# Patient Record
Sex: MALE | Race: WHITE | NOT HISPANIC OR LATINO | Employment: FULL TIME | URBAN - METROPOLITAN AREA
[De-identification: names, ages, dates, MRNs, and addresses within clinical notes are randomized per-mention and may not be internally consistent; named-entity substitution may affect disease eponyms.]

---

## 2020-01-08 ENCOUNTER — OFFICE VISIT (OUTPATIENT)
Dept: FAMILY MEDICINE CLINIC | Facility: CLINIC | Age: 25
End: 2020-01-08

## 2020-01-08 VITALS
HEIGHT: 69 IN | DIASTOLIC BLOOD PRESSURE: 76 MMHG | HEART RATE: 94 BPM | RESPIRATION RATE: 16 BRPM | BODY MASS INDEX: 44.58 KG/M2 | WEIGHT: 301 LBS | OXYGEN SATURATION: 99 % | SYSTOLIC BLOOD PRESSURE: 124 MMHG

## 2020-01-08 DIAGNOSIS — F41.8 DEPRESSION WITH ANXIETY: ICD-10-CM

## 2020-01-08 DIAGNOSIS — Z79.899 HIGH RISK MEDICATION USE: ICD-10-CM

## 2020-01-08 DIAGNOSIS — B37.2 INTERTRIGINOUS CANDIDIASIS: ICD-10-CM

## 2020-01-08 DIAGNOSIS — Z23 NEED FOR IMMUNIZATION AGAINST INFLUENZA: ICD-10-CM

## 2020-01-08 DIAGNOSIS — IMO0001 HORMONAL IMBALANCE IN TRANSGENDER PATIENT: Primary | ICD-10-CM

## 2020-01-08 PROCEDURE — 90686 IIV4 VACC NO PRSV 0.5 ML IM: CPT | Performed by: FAMILY MEDICINE

## 2020-01-08 PROCEDURE — 99204 OFFICE O/P NEW MOD 45 MIN: CPT | Performed by: FAMILY MEDICINE

## 2020-01-08 PROCEDURE — 90471 IMMUNIZATION ADMIN: CPT | Performed by: FAMILY MEDICINE

## 2020-01-08 RX ORDER — ACETAZOLAMIDE 500 MG/1
500 CAPSULE, EXTENDED RELEASE ORAL DAILY
COMMUNITY
Start: 2019-11-19 | End: 2022-12-29 | Stop reason: SDUPTHER

## 2020-01-08 RX ORDER — IBUPROFEN 200 MG
200 TABLET ORAL
COMMUNITY
End: 2020-05-21

## 2020-01-08 RX ORDER — SYRINGE AND NEEDLE,INSULIN,1ML 25GX1"
SYRINGE, EMPTY DISPOSABLE MISCELLANEOUS
COMMUNITY
Start: 2019-12-11 | End: 2020-08-04

## 2020-01-08 RX ORDER — SPIRONOLACTONE 50 MG/1
TABLET, FILM COATED ORAL
COMMUNITY
Start: 2019-12-06 | End: 2020-05-21 | Stop reason: SDUPTHER

## 2020-01-08 RX ORDER — ESTRADIOL VALERATE 40 MG/ML
40 INJECTION INTRAMUSCULAR 2 TIMES WEEKLY
COMMUNITY
Start: 2019-10-21 | End: 2020-11-16 | Stop reason: SDUPTHER

## 2020-01-08 NOTE — PROGRESS NOTES
Amos Powell is a 24 y.o. male. Pt is a new pt for the clinic, and an actual Dr. Jason pt and is here for Hormone therapy replacement f/u, depression and rash.   Pt feels a little depress. Not using any medication currently, had last Psych apt about 4-5 months ago. Appointments are very pricey.   Pt also complains of a rash, inguinal area, bilateral. States sees like a discharge, white color. Says itched in the past days, not right now. Denies burning or skin cracking. It's first time ever happened to pt.    Pt will receive today Influenza immunization.     Chief Complaint   Patient presents with   • hormone therapy replacement   • Rash       GRISEL Coleman is here to reestablish care.  She was previously seen by myself at [facility].    Has been on gender affirming hormone therapy for some time, due for check of levels today and some refills.  Has been happy with her current regimen, is wondering about how much longer she might need to take spironolactone.  Happy with changes overall.    Depression has not been well controlled recently.  She was having difficulty seeing her psychiatrist due to finances.Does not does not feel that the bupropion was helping.  Seems to recall being on escitalopram in the past, though is not sure of other medications.  Does not believe that she is tried sertraline.    Noticed some rash in her inner thighs that started after an episode of long and vigorous walking.  Rash was wet weeping itchy, she thought due to yeast.  Does seem to be improving with topical terbinafine.    The following portions of the patient's history were reviewed and updated as appropriate: allergies, current medications, past family history, past medical history, past social history, past surgical history and problem list.    Review of Systems   Constitutional: Negative for chills, fatigue, fever and unexpected weight change.   HENT: Negative for sore throat.    Respiratory: Negative for cough, shortness of  "breath and wheezing.    Cardiovascular: Negative for chest pain, palpitations and leg swelling.   Gastrointestinal: Negative for abdominal distention, abdominal pain, constipation, diarrhea, nausea and vomiting.   Genitourinary: Negative for dysuria.   Musculoskeletal: Negative for arthralgias and myalgias.   Skin: Positive for color change and rash.   Neurological: Negative for dizziness.   Psychiatric/Behavioral: Positive for suicidal ideas. Negative for confusion. The patient is nervous/anxious.      I have reviewed and confirmed the ROS as documented by the MA. Nabil Jason MD    Objective  Vitals:    01/08/20 1414   BP: 124/76   Pulse: 94   Resp: 16   SpO2: 99%     Body mass index is 44.45 kg/m².    Physical Exam   Constitutional: He is oriented to person, place, and time. He appears well-developed and well-nourished. No distress.   Eyes: Pupils are equal, round, and reactive to light. EOM are normal.   Neck: Normal range of motion. Neck supple.   Cardiovascular: Normal rate, regular rhythm, normal heart sounds and intact distal pulses.   No murmur heard.  Pulmonary/Chest: Effort normal and breath sounds normal. No respiratory distress. He has no wheezes.   Abdominal: Soft. Bowel sounds are normal. There is no tenderness. There is no rebound and no guarding.   Musculoskeletal: Normal range of motion.   Neurological: He is alert and oriented to person, place, and time.   Skin: Skin is warm and dry.   Psychiatric: He has a normal mood and affect. His behavior is normal.   Nursing note and vitals reviewed.        Current Outpatient Medications:   •  acetaZOLAMIDE (DIAMOX) 500 MG capsule, TK ONE C PO  QD, Disp: , Rfl:   •  B-D INSULIN SYRINGE 1CC/25GX1\" 25G X 1\" 1 ML misc, USE TWICE A WEEK, Disp: , Rfl:   •  estradiol valerate (DELESTROGEN) 40 MG/ML injection, INJECT 0.15 ML IM Q 3.5 DAYS, Disp: , Rfl:   •  ibuprofen (ADVIL,MOTRIN) 200 MG tablet, Take 200 mg by mouth., Disp: , Rfl:   •  progesterone " (PROMETRIUM) 100 MG capsule, Take 100 mg by mouth Daily., Disp: , Rfl:   •  spironolactone (ALDACTONE) 50 MG tablet, , Disp: , Rfl:   •  sertraline (ZOLOFT) 50 MG tablet, Take 1 tablet by mouth Daily for 180 days., Disp: 90 tablet, Rfl: 1  Current outpatient and discharge medications have been reconciled for the patient.  Reviewed by: Nabil Jason MD      Procedures    Lab Results (most recent)     Lorri Trinidad was seen today for hormone therapy replacement and rash.    Diagnoses and all orders for this visit:    Hormonal imbalance in transgender patient  -     Estradiol  -     Estrone  -     Testosterone    High risk medication use  -     Estradiol  -     Estrone  -     Testosterone  -     Comprehensive Metabolic Panel  -     Lipid Panel With LDL / HDL Ratio    Need for immunization against influenza  -     Fluarix/Fluzone/Afluria/FluLaval (1019-2211)    Depression with anxiety  -     sertraline (ZOLOFT) 50 MG tablet; Take 1 tablet by mouth Daily for 180 days.    Intertriginous candidiasis    Orders as above.  I will contact her with results as soon as they are available.  Encouraged to sign up for and utilize [employer].    Decided we will try some sertraline for anxiety and depression.  Gave instructions for slow titration.  Discussed risks, benefits and expected effects.    Recommended some body glide for chafing to prevent any further intertrigo.  Can continue over-the-counter topical antifungal.    Any information loaded into the AVS was placed there by XIMENA Jason MD, and patient was counseled on the same.   No follow-ups on file.      XIMENA Jason MD

## 2020-01-09 LAB
ALBUMIN SERPL-MCNC: 4.2 G/DL (ref 3.5–5.5)
ALBUMIN/GLOB SERPL: 1.4 {RATIO} (ref 1.2–2.2)
ALP SERPL-CCNC: 55 IU/L (ref 39–117)
ALT SERPL-CCNC: 36 IU/L (ref 0–44)
AST SERPL-CCNC: 25 IU/L (ref 0–40)
BILIRUB SERPL-MCNC: 0.4 MG/DL (ref 0–1.2)
BUN SERPL-MCNC: 9 MG/DL (ref 6–20)
BUN/CREAT SERPL: 17 (ref 9–20)
CALCIUM SERPL-MCNC: 8.9 MG/DL (ref 8.7–10.2)
CHLORIDE SERPL-SCNC: 107 MMOL/L (ref 96–106)
CHOLEST SERPL-MCNC: 132 MG/DL (ref 100–199)
CO2 SERPL-SCNC: 18 MMOL/L (ref 20–29)
CREAT SERPL-MCNC: 0.52 MG/DL (ref 0.76–1.27)
ESTRADIOL SERPL-MCNC: 422.6 PG/ML (ref 7.6–42.6)
GLOBULIN SER CALC-MCNC: 2.9 G/DL (ref 1.5–4.5)
GLUCOSE SERPL-MCNC: 100 MG/DL (ref 65–99)
HDLC SERPL-MCNC: 40 MG/DL
LDLC SERPL CALC-MCNC: 66 MG/DL (ref 0–99)
LDLC/HDLC SERPL: 1.7 RATIO (ref 0–3.6)
POTASSIUM SERPL-SCNC: 4.1 MMOL/L (ref 3.5–5.2)
PROT SERPL-MCNC: 7.1 G/DL (ref 6–8.5)
SODIUM SERPL-SCNC: 142 MMOL/L (ref 134–144)
TESTOST SERPL-MCNC: 9 NG/DL (ref 264–916)
TRIGL SERPL-MCNC: 128 MG/DL (ref 0–149)
VLDLC SERPL CALC-MCNC: 26 MG/DL (ref 5–40)

## 2020-01-10 LAB — ESTRONE SERPL-MCNC: 223 PG/ML (ref 15–65)

## 2020-05-21 ENCOUNTER — OFFICE VISIT (OUTPATIENT)
Dept: FAMILY MEDICINE CLINIC | Facility: CLINIC | Age: 25
End: 2020-05-21

## 2020-05-21 VITALS
WEIGHT: 314 LBS | DIASTOLIC BLOOD PRESSURE: 90 MMHG | HEART RATE: 103 BPM | RESPIRATION RATE: 18 BRPM | HEIGHT: 69 IN | BODY MASS INDEX: 46.51 KG/M2 | OXYGEN SATURATION: 97 % | SYSTOLIC BLOOD PRESSURE: 134 MMHG

## 2020-05-21 DIAGNOSIS — IMO0001 HORMONAL IMBALANCE IN TRANSGENDER PATIENT: Primary | ICD-10-CM

## 2020-05-21 DIAGNOSIS — F41.8 DEPRESSION WITH ANXIETY: ICD-10-CM

## 2020-05-21 DIAGNOSIS — K21.9 GASTROESOPHAGEAL REFLUX DISEASE WITHOUT ESOPHAGITIS: ICD-10-CM

## 2020-05-21 DIAGNOSIS — R05.9 COUGH: ICD-10-CM

## 2020-05-21 PROCEDURE — 99214 OFFICE O/P EST MOD 30 MIN: CPT | Performed by: FAMILY MEDICINE

## 2020-05-21 RX ORDER — PANTOPRAZOLE SODIUM 20 MG/1
20 TABLET, DELAYED RELEASE ORAL DAILY
Qty: 90 TABLET | Refills: 1 | Status: SHIPPED | OUTPATIENT
Start: 2020-05-21 | End: 2021-02-23

## 2020-05-21 RX ORDER — SPIRONOLACTONE 50 MG/1
50 TABLET, FILM COATED ORAL DAILY
Qty: 90 TABLET | Refills: 1 | Status: SHIPPED | OUTPATIENT
Start: 2020-05-21 | End: 2020-11-17

## 2020-05-21 NOTE — PROGRESS NOTES
"    Amos Powell is a 25 y.o. male.     Chief Complaint   Patient presents with   • Heartburn       HPI     Here with concern for worsening GERD symptoms. Have been getting progressively worse over the past several months. BMs have been normal, typically 1-4 daily, never hard or requiring any straining. Has tried some OTC meds, TUMS help briefly, ranitidine \"doesn't do much\". Can't recall dose that was taken. Symptoms are tied to carbonated beverages, greasy foods, and acidic foods. Has been trying to cut back on the first two with some improvement.     Requesting refill of progesterone and spironolactone.  We discussed weaning down the spironolactone in the past.  She tried stopping it abruptly, noticed some increased hair growth which was rather bothersome.  Would be willing to try slowly tapering it down again.  Continues to take the progesterone with good results, tolerating it well.    Also requesting refill for sertraline.  Taking as prescribed and tolerating well with good symptom relief.    Lastly has had a cough off and on for several weeks.  Worried about her potential exposures to COVID, interested in testing today.    The following portions of the patient's history were reviewed and updated as appropriate: allergies, current medications, past family history, past medical history, past social history, past surgical history and problem list.    Review of Systems   Constitutional: Negative for chills, fatigue and fever.   Respiratory: Positive for cough.    Gastrointestinal: Positive for abdominal distention and nausea. Negative for constipation, diarrhea and vomiting.     I have reviewed and confirmed the ROS as documented by the MA. Nabil Jason MD    Objective  Vitals:    05/21/20 1550   BP: 134/90   Pulse: 103   Resp: 18   SpO2: 97%     Body mass index is 46.37 kg/m².    Physical Exam   Constitutional: He appears well-developed and well-nourished. No distress.   Cardiovascular: Normal rate, " "regular rhythm, normal heart sounds and intact distal pulses.   No murmur heard.  Pulmonary/Chest: Effort normal and breath sounds normal. No respiratory distress. He has no wheezes. He has no rales.   Abdominal: Soft. Bowel sounds are normal. He exhibits no distension. There is no tenderness. There is no rebound and no guarding.   Psychiatric: He has a normal mood and affect. His behavior is normal.   Nursing note and vitals reviewed.        Current Outpatient Medications:   •  acetaZOLAMIDE (DIAMOX) 500 MG capsule, TK ONE C PO  QD, Disp: , Rfl:   •  B-D INSULIN SYRINGE 1CC/25GX1\" 25G X 1\" 1 ML misc, USE TWICE A WEEK, Disp: , Rfl:   •  estradiol valerate (DELESTROGEN) 40 MG/ML injection, INJECT 0.15 ML IM Q 3.5 DAYS, Disp: , Rfl:   •  progesterone (PROMETRIUM) 100 MG capsule, Take 1 capsule by mouth Daily for 180 days., Disp: 90 capsule, Rfl: 1  •  sertraline (ZOLOFT) 50 MG tablet, Take 1 tablet by mouth Daily for 90 days., Disp: 30 tablet, Rfl: 2  •  spironolactone (ALDACTONE) 50 MG tablet, Take 1 tablet by mouth Daily for 180 days., Disp: 90 tablet, Rfl: 1  •  esomeprazole (nexIUM) 20 MG capsule, Take 1 capsule by mouth Every Morning Before Breakfast for 90 days., Disp: 90 capsule, Rfl: 0  Current outpatient and discharge medications have been reconciled for the patient.  Reviewed by: Nabil Jason MD      Procedures    Lab Results (most recent)     Lorri Trinidad was seen today for heartburn.    Diagnoses and all orders for this visit:    Hormonal imbalance in transgender patient  -     spironolactone (ALDACTONE) 50 MG tablet; Take 1 tablet by mouth Daily for 180 days.  -     progesterone (PROMETRIUM) 100 MG capsule; Take 1 capsule by mouth Daily for 180 days.    Depression with anxiety  -     sertraline (ZOLOFT) 50 MG tablet; Take 1 tablet by mouth Daily for 90 days.    Cough  -     Cancel: SARS-CoV-2, CAROL ANN ([facility]) - Swab, Nasopharynx  -     SARS-CoV-2, CAROL ANN ([facility]) - Swab, Nasopharynx; " Future    Gastroesophageal reflux disease without esophagitis  -     esomeprazole (nexIUM) 20 MG capsule; Take 1 capsule by mouth Every Morning Before Breakfast for 90 days.    Orders as above.  We discussed slowly titrating down the spironolactone.  Continue regimen otherwise.    We will try a short course of esomeprazole.  Encouraged her to take the lowest effective dose.    We will go to a [facility] urgent care facility for COVID testing as ordered above.    Any information loaded into the AVS was placed there by XIMENA Jason MD, and patient was counseled on the same.   Return in about 4 weeks (around 6/18/2020).      XIMENA Jason MD

## 2020-06-06 ENCOUNTER — HOSPITAL ENCOUNTER (EMERGENCY)
Facility: HOSPITAL | Age: 25
Discharge: HOME | End: 2020-06-07
Admitting: EMERGENCY MEDICINE

## 2020-06-06 ENCOUNTER — APPOINTMENT (OUTPATIENT)
Dept: CT IMAGING | Facility: HOSPITAL | Age: 25
End: 2020-06-06

## 2020-06-06 DIAGNOSIS — N20.1 LEFT URETERAL STONE: Primary | ICD-10-CM

## 2020-06-06 DIAGNOSIS — N13.30 HYDRONEPHROSIS OF LEFT KIDNEY: ICD-10-CM

## 2020-06-06 LAB
ALBUMIN SERPL-MCNC: 4.3 G/DL (ref 3.5–5.2)
ALBUMIN/GLOB SERPL: 1.2 G/DL
ALP SERPL-CCNC: 60 U/L (ref 39–117)
ALT SERPL W P-5'-P-CCNC: 43 U/L (ref 1–41)
ANION GAP SERPL CALCULATED.3IONS-SCNC: 12 MMOL/L (ref 5–15)
AST SERPL-CCNC: 30 U/L (ref 1–40)
BACTERIA UR QL AUTO: ABNORMAL /HPF
BASOPHILS # BLD AUTO: 0.1 10*3/MM3 (ref 0–0.2)
BASOPHILS NFR BLD AUTO: 0.5 % (ref 0–1.5)
BILIRUB SERPL-MCNC: 0.4 MG/DL (ref 0.2–1.2)
BILIRUB UR QL STRIP: NEGATIVE
BUN BLD-MCNC: 10 MG/DL (ref 6–20)
BUN BLD-MCNC: ABNORMAL MG/DL
BUN/CREAT SERPL: ABNORMAL
CALCIUM SPEC-SCNC: 9.5 MG/DL (ref 8.6–10.5)
CHLORIDE SERPL-SCNC: 107 MMOL/L (ref 98–107)
CLARITY UR: ABNORMAL
CO2 SERPL-SCNC: 20 MMOL/L (ref 22–29)
COLOR UR: YELLOW
CREAT BLD-MCNC: 0.6 MG/DL (ref 0.76–1.27)
DEPRECATED RDW RBC AUTO: 38.5 FL (ref 37–54)
EOSINOPHIL # BLD AUTO: 0.2 10*3/MM3 (ref 0–0.4)
EOSINOPHIL NFR BLD AUTO: 1.4 % (ref 0.3–6.2)
ERYTHROCYTE [DISTWIDTH] IN BLOOD BY AUTOMATED COUNT: 12.9 % (ref 12.3–15.4)
GFR SERPL CREATININE-BSD FRML MDRD: >150 ML/MIN/1.73
GLOBULIN UR ELPH-MCNC: 3.6 GM/DL
GLUCOSE BLD-MCNC: 97 MG/DL (ref 65–99)
GLUCOSE UR STRIP-MCNC: NEGATIVE MG/DL
HCT VFR BLD AUTO: 39.4 % (ref 37.5–51)
HGB BLD-MCNC: 13.5 G/DL (ref 13–17.7)
HGB UR QL STRIP.AUTO: ABNORMAL
HOLD SPECIMEN: NORMAL
HOLD SPECIMEN: NORMAL
HYALINE CASTS UR QL AUTO: ABNORMAL /LPF
KETONES UR QL STRIP: NEGATIVE
LEUKOCYTE ESTERASE UR QL STRIP.AUTO: NEGATIVE
LIPASE SERPL-CCNC: 19 U/L (ref 13–60)
LYMPHOCYTES # BLD AUTO: 2 10*3/MM3 (ref 0.7–3.1)
LYMPHOCYTES NFR BLD AUTO: 18 % (ref 19.6–45.3)
MCH RBC QN AUTO: 29.2 PG (ref 26.6–33)
MCHC RBC AUTO-ENTMCNC: 34.2 G/DL (ref 31.5–35.7)
MCV RBC AUTO: 85.4 FL (ref 79–97)
MONOCYTES # BLD AUTO: 0.9 10*3/MM3 (ref 0.1–0.9)
MONOCYTES NFR BLD AUTO: 7.6 % (ref 5–12)
NEUTROPHILS # BLD AUTO: 8.2 10*3/MM3 (ref 1.7–7)
NEUTROPHILS NFR BLD AUTO: 72.5 % (ref 42.7–76)
NITRITE UR QL STRIP: NEGATIVE
NRBC BLD AUTO-RTO: 0 /100 WBC (ref 0–0.2)
PH UR STRIP.AUTO: 7 [PH] (ref 5–8)
PLATELET # BLD AUTO: 475 10*3/MM3 (ref 140–450)
PMV BLD AUTO: 6.6 FL (ref 6–12)
POTASSIUM BLD-SCNC: 3.5 MMOL/L (ref 3.5–5.2)
PROT SERPL-MCNC: 7.9 G/DL (ref 6–8.5)
PROT UR QL STRIP: ABNORMAL
RBC # BLD AUTO: 4.62 10*6/MM3 (ref 4.14–5.8)
RBC # UR: ABNORMAL /HPF
REF LAB TEST METHOD: ABNORMAL
SODIUM BLD-SCNC: 139 MMOL/L (ref 136–145)
SP GR UR STRIP: 1.02 (ref 1–1.03)
SQUAMOUS #/AREA URNS HPF: ABNORMAL /HPF
UROBILINOGEN UR QL STRIP: ABNORMAL
WBC NRBC COR # BLD: 11.3 10*3/MM3 (ref 3.4–10.8)
WBC UR QL AUTO: ABNORMAL /HPF
WHOLE BLOOD HOLD SPECIMEN: NORMAL
WHOLE BLOOD HOLD SPECIMEN: NORMAL

## 2020-06-06 PROCEDURE — 83690 ASSAY OF LIPASE: CPT | Performed by: NURSE PRACTITIONER

## 2020-06-06 PROCEDURE — 25010000002 ONDANSETRON PER 1 MG: Performed by: NURSE PRACTITIONER

## 2020-06-06 PROCEDURE — 81001 URINALYSIS AUTO W/SCOPE: CPT | Performed by: NURSE PRACTITIONER

## 2020-06-06 PROCEDURE — 99284 EMERGENCY DEPT VISIT MOD MDM: CPT

## 2020-06-06 PROCEDURE — 96375 TX/PRO/DX INJ NEW DRUG ADDON: CPT

## 2020-06-06 PROCEDURE — 96374 THER/PROPH/DIAG INJ IV PUSH: CPT

## 2020-06-06 PROCEDURE — 25010000002 HYDROMORPHONE PER 4 MG: Performed by: NURSE PRACTITIONER

## 2020-06-06 PROCEDURE — 74176 CT ABD & PELVIS W/O CONTRAST: CPT

## 2020-06-06 PROCEDURE — 85025 COMPLETE CBC W/AUTO DIFF WBC: CPT | Performed by: NURSE PRACTITIONER

## 2020-06-06 PROCEDURE — 80053 COMPREHEN METABOLIC PANEL: CPT | Performed by: NURSE PRACTITIONER

## 2020-06-06 RX ORDER — HYDROMORPHONE HCL 110MG/55ML
1 PATIENT CONTROLLED ANALGESIA SYRINGE INTRAVENOUS ONCE
Status: COMPLETED | OUTPATIENT
Start: 2020-06-06 | End: 2020-06-06

## 2020-06-06 RX ORDER — SODIUM CHLORIDE 0.9 % (FLUSH) 0.9 %
10 SYRINGE (ML) INJECTION AS NEEDED
Status: DISCONTINUED | OUTPATIENT
Start: 2020-06-06 | End: 2020-06-07 | Stop reason: HOSPADM

## 2020-06-06 RX ORDER — ONDANSETRON 2 MG/ML
4 INJECTION INTRAMUSCULAR; INTRAVENOUS ONCE
Status: COMPLETED | OUTPATIENT
Start: 2020-06-06 | End: 2020-06-06

## 2020-06-06 RX ADMIN — ONDANSETRON 4 MG: 2 INJECTION INTRAMUSCULAR; INTRAVENOUS at 22:31

## 2020-06-06 RX ADMIN — SODIUM CHLORIDE, SODIUM LACTATE, POTASSIUM CHLORIDE, AND CALCIUM CHLORIDE 1000 ML: 600; 310; 30; 20 INJECTION, SOLUTION INTRAVENOUS at 22:31

## 2020-06-06 RX ADMIN — HYDROMORPHONE HYDROCHLORIDE 1 MG: 2 INJECTION, SOLUTION INTRAMUSCULAR; INTRAVENOUS; SUBCUTANEOUS at 22:31

## 2020-06-07 VITALS
RESPIRATION RATE: 16 BRPM | BODY MASS INDEX: 45.65 KG/M2 | WEIGHT: 308.2 LBS | HEIGHT: 69 IN | HEART RATE: 103 BPM | DIASTOLIC BLOOD PRESSURE: 77 MMHG | SYSTOLIC BLOOD PRESSURE: 108 MMHG | OXYGEN SATURATION: 98 % | TEMPERATURE: 97.1 F

## 2020-06-07 RX ORDER — ONDANSETRON 4 MG/1
4 TABLET, ORALLY DISINTEGRATING ORAL EVERY 8 HOURS PRN
Qty: 20 TABLET | Refills: 0 | Status: SHIPPED | OUTPATIENT
Start: 2020-06-07 | End: 2020-08-04

## 2020-06-07 RX ORDER — TAMSULOSIN HYDROCHLORIDE 0.4 MG/1
1 CAPSULE ORAL NIGHTLY
Qty: 30 CAPSULE | Refills: 0 | Status: SHIPPED | OUTPATIENT
Start: 2020-06-07 | End: 2020-08-04

## 2020-06-07 RX ORDER — HYDROCODONE BITARTRATE AND ACETAMINOPHEN 7.5; 325 MG/1; MG/1
1 TABLET ORAL EVERY 6 HOURS PRN
Qty: 12 TABLET | Refills: 0 | Status: SHIPPED | OUTPATIENT
Start: 2020-06-07 | End: 2020-08-04

## 2020-06-07 NOTE — ED PROVIDER NOTES
"Subjective   25-year-old male that identifies as a female presents with complaint of left flank pain that radiates to the testicle with nausea and vomiting 2 episodes since 11 AM.  Reports a history of kidney stones and this \"feels the same\" denies fever sweats or chills.  Denies hematuria does report some cloudy urine.  Denies melena hematochezia.  Reports normal bowel movements.  Patient is on hormone therapy for transition.  Denies having a urologist.    1. Location: Left flank  2. Quality: Sharp shooting  3. Severity: Moderate  4. Worsening factors: Vomiting  5. Alleviating factors: Denies  6. Onset: 11 AM  7. Radiation: left testicle  8. Frequency: constant with periods of intensity  9. Co-morbidities: Past Medical History:  No date: Depression  No date: Gender dysphoria  No date: Kidney calculi  10. Source: patient            Review of Systems   Constitutional: Negative for appetite change, chills, diaphoresis and fever.   Gastrointestinal: Positive for abdominal pain, nausea and vomiting. Negative for blood in stool, constipation and diarrhea.   Genitourinary: Positive for flank pain and testicular pain. Negative for decreased urine volume, difficulty urinating and hematuria.   Skin: Negative for color change, pallor and rash.   Hematological: Negative for adenopathy.   All other systems reviewed and are negative.      Past Medical History:   Diagnosis Date   • Depression    • Gender dysphoria    • Kidney calculi        No Known Allergies    Past Surgical History:   Procedure Laterality Date   • WISDOM TOOTH EXTRACTION Bilateral     top and bottom       Family History   Problem Relation Age of Onset   • Hypertension Maternal Grandmother    • Diabetes type II Paternal Grandfather        Social History     Socioeconomic History   • Marital status: Single     Spouse name: Not on file   • Number of children: Not on file   • Years of education: Not on file   • Highest education level: Not on file   Tobacco Use   • " Smoking status: Never Smoker   Substance and Sexual Activity   • Alcohol use: Yes     Alcohol/week: 2.0 standard drinks     Types: 2 Cans of beer per week   • Drug use: Never   • Sexual activity: Yes     Partners: Female     Birth control/protection: Condom           Objective   Physical Exam   Constitutional: He is oriented to person, place, and time. He appears well-developed and well-nourished. He is active and cooperative.  Non-toxic appearance. He appears distressed (pain).   Cardiovascular: Normal rate, regular rhythm, normal heart sounds and intact distal pulses. Exam reveals no gallop and no friction rub.   No murmur heard.  Pulmonary/Chest: Effort normal and breath sounds normal. No respiratory distress. He has no wheezes. He has no rales.   Abdominal: Soft. Normal appearance and bowel sounds are normal. He exhibits no distension and no mass. There is no hepatosplenomegaly. There is tenderness. There is no rigidity, no rebound, no guarding, no CVA tenderness, no tenderness at McBurney's point and negative Lomas's sign. No hernia. Hernia confirmed negative in the right inguinal area and confirmed negative in the left inguinal area.   Genitourinary: Penis normal. Cremasteric reflex is present. Right testis shows no mass, no swelling and no tenderness. Right testis is descended. Cremasteric reflex is not absent on the right side. Left testis shows tenderness. Left testis shows no mass and no swelling. Left testis is descended. Cremasteric reflex is not absent on the left side. Circumcised.   Lymphadenopathy: No inguinal adenopathy noted on the right or left side.   Neurological: He is alert and oriented to person, place, and time.   Skin: Skin is warm and dry. Capillary refill takes less than 2 seconds. No rash noted. No erythema. No pallor.   Psychiatric: He has a normal mood and affect. His behavior is normal. Judgment and thought content normal.   Nursing note and vitals reviewed.      Procedures            ED Course    Ct Abdomen Pelvis Without Contrast    Result Date: 6/6/2020  1.  Mild to moderate left-sided hydronephrosis due to a 2.5 mm proximal ureteral stone. 2.  6 mm right renal stone. 3.  Fatty replacement of liver. Electronically signed by:  Jaxon Gordon M.D.  6/6/2020 9:35 PM    Medications   sodium chloride 0.9 % flush 10 mL (has no administration in time range)   lactated ringers bolus 1,000 mL (1,000 mL Intravenous New Bag 6/6/20 2231)   HYDROmorphone (DILAUDID) injection 1 mg (1 mg Intravenous Given 6/6/20 2231)   ondansetron (ZOFRAN) injection 4 mg (4 mg Intravenous Given 6/6/20 2231)     Labs Reviewed   COMPREHENSIVE METABOLIC PANEL - Abnormal; Notable for the following components:       Result Value    Creatinine 0.60 (*)     CO2 20.0 (*)     ALT (SGPT) 43 (*)     All other components within normal limits    Narrative:     GFR Normal >60  Chronic Kidney Disease <60  Kidney Failure <15     URINALYSIS W/ MICROSCOPIC IF INDICATED (NO CULTURE) - Abnormal; Notable for the following components:    Appearance, UA Hazy (*)     Blood, UA Large (3+) (*)     Protein, UA 30 mg/dL (1+) (*)     All other components within normal limits   CBC WITH AUTO DIFFERENTIAL - Abnormal; Notable for the following components:    WBC 11.30 (*)     Platelets 475 (*)     Lymphocyte % 18.0 (*)     Neutrophils, Absolute 8.20 (*)     All other components within normal limits   URINALYSIS, MICROSCOPIC ONLY - Abnormal; Notable for the following components:    RBC, UA 31-50 (*)     WBC, UA 3-5 (*)     Squamous Epithelial Cells, UA 3-6 (*)     All other components within normal limits   LIPASE - Normal   BUN - Normal   RAINBOW DRAW    Narrative:     The following orders were created for panel order [place] Draw.  Procedure                               Abnormality         Status                     ---------                               -----------         ------                     Light Blue Top[[id number]]                       "             Final result               Green Top (Gel)[[id number]]                                  Final result               Lavender Top[[id number]]                                     Final result               Gold Top - SST[[id number]]                                   Final result                 Please view results for these tests on the individual orders.   CBC AND DIFFERENTIAL    Narrative:     The following orders were created for panel order CBC & Differential.  Procedure                               Abnormality         Status                     ---------                               -----------         ------                     CBC Auto Differential[[id number]]        Abnormal            Final result                 Please view results for these tests on the individual orders.   LIGHT BLUE TOP   GREEN TOP   LAVENDER TOP   GOLD TOP - SST                                            MDM  Number of Diagnoses or Management Options  Hydronephrosis of left kidney:   Left ureteral stone:   Diagnosis management comments: Chart Review: 5/25/2020 patient was seen by PCP for follow-up on dyspepsia.  Comorbidity: Past Medical History:  No date: Depression  No date: Gender dysphoria  No date: Kidney calculi  Imaging: Was interpreted by physician and reviewed by myself: Ct Abdomen Pelvis Without Contrast    Result Date: 6/6/2020  1.  Mild to moderate left-sided hydronephrosis due to a 2.5 mm proximal ureteral stone. 2.  6 mm right renal stone. 3.  Fatty replacement of liver. Electronically signed by:  Jaxon Gordon M.D.  6/6/2020 9:35 PM      Patient undressed and placed in gown for exam.  Appropriate PPE worn during patient exam. 25-year-old male that identifies as a female presents with complaint of left flank pain that radiates to the testicle with nausea and vomiting 2 episodes since 11 AM.  Reports a history of kidney stones and this \"feels the same\" denies fever sweats or chills.  Denies hematuria does report " some cloudy urine.  Denies melena hematochezia.  Reports normal bowel movements.  Patient is on hormone therapy for transition.  Denies having a urologist.  IV established and labs obtained.  Abdominal protocol initiated.  CT abdomen pelvis without contrast obtained, which was significant for a left proximal ureter stone with hydronephrosis.  Upon reassessment, patient reports relief with medications given.  He was discharged home with Flomax and Zofran.  Inspect performed yielding no results.  Patient was given Norco 7.5/325 p.o. quantity of 12.  He is given follow-up with urology.    Disposition/Treatment: Discussed results with patient, verbalized understanding.  Discussed reasons to return to the ER, patient verbalized understanding.  Agreeable with plan of care.  Patient was stable upon discharge.               Amount and/or Complexity of Data Reviewed  Clinical lab tests: reviewed  Tests in the radiology section of CPT®: reviewed    Patient Progress  Patient progress: stable      Final diagnoses:   Left ureteral stone   Hydronephrosis of left kidney            Sri Kessler NP  06/07/20 0059

## 2020-06-07 NOTE — DISCHARGE INSTRUCTIONS
Take medications as prescribed.  Drink lots of water.  Follow renal diet.  Strain all urine and collect stone in specimen cup.  Call urology for follow-up appointment.  Return to the ER for new or worsening symptoms.

## 2020-08-04 ENCOUNTER — ANESTHESIA (OUTPATIENT)
Dept: PERIOP | Facility: HOSPITAL | Age: 25
End: 2020-08-04

## 2020-08-04 ENCOUNTER — READMISSION MANAGEMENT (OUTPATIENT)
Dept: CALL CENTER | Facility: HOSPITAL | Age: 25
End: 2020-08-04

## 2020-08-04 ENCOUNTER — ANESTHESIA EVENT (OUTPATIENT)
Dept: PERIOP | Facility: HOSPITAL | Age: 25
End: 2020-08-04

## 2020-08-04 ENCOUNTER — HOSPITAL ENCOUNTER (OUTPATIENT)
Facility: HOSPITAL | Age: 25
Setting detail: OBSERVATION
Discharge: HOME | End: 2020-08-05
Attending: EMERGENCY MEDICINE | Admitting: HOSPITALIST

## 2020-08-04 ENCOUNTER — HOSPITAL ENCOUNTER (OUTPATIENT)
Facility: HOSPITAL | Age: 25
Discharge: HOME | End: 2020-08-04
Attending: HOSPITALIST | Admitting: NURSE PRACTITIONER

## 2020-08-04 ENCOUNTER — APPOINTMENT (OUTPATIENT)
Dept: CT IMAGING | Facility: HOSPITAL | Age: 25
End: 2020-08-04

## 2020-08-04 VITALS
BODY MASS INDEX: 46.65 KG/M2 | RESPIRATION RATE: 18 BRPM | HEIGHT: 69 IN | WEIGHT: 315 LBS | SYSTOLIC BLOOD PRESSURE: 118 MMHG | OXYGEN SATURATION: 96 % | TEMPERATURE: 97.9 F | DIASTOLIC BLOOD PRESSURE: 77 MMHG | HEART RATE: 99 BPM

## 2020-08-04 DIAGNOSIS — D72.829 LEUKOCYTOSIS, UNSPECIFIED TYPE: ICD-10-CM

## 2020-08-04 DIAGNOSIS — R10.9 RIGHT FLANK PAIN: ICD-10-CM

## 2020-08-04 DIAGNOSIS — N20.1 RIGHT URETERAL STONE: ICD-10-CM

## 2020-08-04 DIAGNOSIS — Q62.11 HYDRONEPHROSIS WITH URETEROPELVIC JUNCTION (UPJ) OBSTRUCTION: Primary | ICD-10-CM

## 2020-08-04 DIAGNOSIS — R10.9 FLANK PAIN: ICD-10-CM

## 2020-08-04 DIAGNOSIS — R11.2 NAUSEA AND VOMITING, INTRACTABILITY OF VOMITING NOT SPECIFIED, UNSPECIFIED VOMITING TYPE: ICD-10-CM

## 2020-08-04 DIAGNOSIS — R10.9 RIGHT FLANK PAIN: Primary | ICD-10-CM

## 2020-08-04 PROBLEM — F41.8 DEPRESSION WITH ANXIETY: Chronic | Status: ACTIVE | Noted: 2020-01-08

## 2020-08-04 PROBLEM — K21.9 GASTROESOPHAGEAL REFLUX DISEASE WITHOUT ESOPHAGITIS: Chronic | Status: ACTIVE | Noted: 2020-05-21

## 2020-08-04 PROBLEM — E66.01 OBESITY, CLASS III, BMI 40-49.9 (MORBID OBESITY): Chronic | Status: ACTIVE | Noted: 2020-08-04

## 2020-08-04 PROBLEM — IMO0001 HORMONAL IMBALANCE IN TRANSGENDER PATIENT: Chronic | Status: ACTIVE | Noted: 2020-01-08

## 2020-08-04 PROBLEM — E66.813 OBESITY, CLASS III, BMI 40-49.9 (MORBID OBESITY): Chronic | Status: ACTIVE | Noted: 2020-08-04

## 2020-08-04 LAB
ALBUMIN SERPL-MCNC: 4.2 G/DL (ref 3.5–5.2)
ALBUMIN/GLOB SERPL: 1.4 G/DL
ALP SERPL-CCNC: 62 U/L (ref 39–117)
ALT SERPL W P-5'-P-CCNC: 46 U/L (ref 1–41)
ANION GAP SERPL CALCULATED.3IONS-SCNC: 15 MMOL/L (ref 5–15)
AST SERPL-CCNC: 34 U/L (ref 1–40)
BACTERIA UR QL AUTO: ABNORMAL /HPF
BASOPHILS # BLD AUTO: 0.1 10*3/MM3 (ref 0–0.2)
BASOPHILS # BLD AUTO: 0.1 10*3/MM3 (ref 0–0.2)
BASOPHILS NFR BLD AUTO: 0.3 % (ref 0–1.5)
BASOPHILS NFR BLD AUTO: 0.7 % (ref 0–1.5)
BILIRUB SERPL-MCNC: 0.3 MG/DL (ref 0–1.2)
BILIRUB UR QL STRIP: NEGATIVE
BUN SERPL-MCNC: 10 MG/DL (ref 6–20)
BUN SERPL-MCNC: ABNORMAL MG/DL
BUN/CREAT SERPL: ABNORMAL
CALCIUM SPEC-SCNC: 9.2 MG/DL (ref 8.6–10.5)
CHLORIDE SERPL-SCNC: 105 MMOL/L (ref 98–107)
CLARITY UR: CLEAR
CO2 SERPL-SCNC: 20 MMOL/L (ref 22–29)
COLOR UR: YELLOW
CREAT SERPL-MCNC: 0.7 MG/DL (ref 0.76–1.27)
DEPRECATED RDW RBC AUTO: 40.3 FL (ref 37–54)
DEPRECATED RDW RBC AUTO: 41.1 FL (ref 37–54)
EOSINOPHIL # BLD AUTO: 0 10*3/MM3 (ref 0–0.4)
EOSINOPHIL # BLD AUTO: 0.2 10*3/MM3 (ref 0–0.4)
EOSINOPHIL NFR BLD AUTO: 0 % (ref 0.3–6.2)
EOSINOPHIL NFR BLD AUTO: 1.6 % (ref 0.3–6.2)
ERYTHROCYTE [DISTWIDTH] IN BLOOD BY AUTOMATED COUNT: 13.3 % (ref 12.3–15.4)
ERYTHROCYTE [DISTWIDTH] IN BLOOD BY AUTOMATED COUNT: 13.7 % (ref 12.3–15.4)
GFR SERPL CREATININE-BSD FRML MDRD: 137 ML/MIN/1.73
GLOBULIN UR ELPH-MCNC: 3 GM/DL
GLUCOSE SERPL-MCNC: 115 MG/DL (ref 65–99)
GLUCOSE UR STRIP-MCNC: NEGATIVE MG/DL
HCT VFR BLD AUTO: 39.5 % (ref 37.5–51)
HCT VFR BLD AUTO: 39.8 % (ref 37.5–51)
HGB BLD-MCNC: 13.1 G/DL (ref 13–17.7)
HGB BLD-MCNC: 13.2 G/DL (ref 13–17.7)
HGB UR QL STRIP.AUTO: ABNORMAL
HOLD SPECIMEN: NORMAL
HYALINE CASTS UR QL AUTO: ABNORMAL /LPF
KETONES UR QL STRIP: NEGATIVE
LEUKOCYTE ESTERASE UR QL STRIP.AUTO: NEGATIVE
LIPASE SERPL-CCNC: 23 U/L (ref 13–60)
LYMPHOCYTES # BLD AUTO: 1.4 10*3/MM3 (ref 0.7–3.1)
LYMPHOCYTES # BLD AUTO: 2.1 10*3/MM3 (ref 0.7–3.1)
LYMPHOCYTES NFR BLD AUTO: 16.1 % (ref 19.6–45.3)
LYMPHOCYTES NFR BLD AUTO: 7 % (ref 19.6–45.3)
MCH RBC QN AUTO: 28.4 PG (ref 26.6–33)
MCH RBC QN AUTO: 28.7 PG (ref 26.6–33)
MCHC RBC AUTO-ENTMCNC: 33.1 G/DL (ref 31.5–35.7)
MCHC RBC AUTO-ENTMCNC: 33.2 G/DL (ref 31.5–35.7)
MCV RBC AUTO: 85.7 FL (ref 79–97)
MCV RBC AUTO: 86.7 FL (ref 79–97)
MONOCYTES # BLD AUTO: 0.5 10*3/MM3 (ref 0.1–0.9)
MONOCYTES # BLD AUTO: 0.9 10*3/MM3 (ref 0.1–0.9)
MONOCYTES NFR BLD AUTO: 2.7 % (ref 5–12)
MONOCYTES NFR BLD AUTO: 7.1 % (ref 5–12)
NEUTROPHILS NFR BLD AUTO: 17.5 10*3/MM3 (ref 1.7–7)
NEUTROPHILS NFR BLD AUTO: 74.5 % (ref 42.7–76)
NEUTROPHILS NFR BLD AUTO: 9.6 10*3/MM3 (ref 1.7–7)
NEUTROPHILS NFR BLD AUTO: 90 % (ref 42.7–76)
NITRITE UR QL STRIP: NEGATIVE
NRBC BLD AUTO-RTO: 0 /100 WBC (ref 0–0.2)
NRBC BLD AUTO-RTO: 0 /100 WBC (ref 0–0.2)
PH UR STRIP.AUTO: 5.5 [PH] (ref 5–8)
PLATELET # BLD AUTO: 482 10*3/MM3 (ref 140–450)
PLATELET # BLD AUTO: 493 10*3/MM3 (ref 140–450)
PMV BLD AUTO: 6.6 FL (ref 6–12)
PMV BLD AUTO: 6.8 FL (ref 6–12)
POTASSIUM SERPL-SCNC: 3.5 MMOL/L (ref 3.5–5.2)
PROT SERPL-MCNC: 7.2 G/DL (ref 6–8.5)
PROT UR QL STRIP: NEGATIVE
RBC # BLD AUTO: 4.56 10*6/MM3 (ref 4.14–5.8)
RBC # BLD AUTO: 4.64 10*6/MM3 (ref 4.14–5.8)
RBC # UR: ABNORMAL /HPF
REF LAB TEST METHOD: ABNORMAL
SODIUM SERPL-SCNC: 140 MMOL/L (ref 136–145)
SP GR UR STRIP: 1.02 (ref 1–1.03)
SQUAMOUS #/AREA URNS HPF: ABNORMAL /HPF
UROBILINOGEN UR QL STRIP: ABNORMAL
WBC # BLD AUTO: 12.9 10*3/MM3 (ref 3.4–10.8)
WBC # BLD AUTO: 19.4 10*3/MM3 (ref 3.4–10.8)
WBC UR QL AUTO: ABNORMAL /HPF
WHOLE BLOOD HOLD SPECIMEN: NORMAL

## 2020-08-04 PROCEDURE — 96374 THER/PROPH/DIAG INJ IV PUSH: CPT

## 2020-08-04 PROCEDURE — 80053 COMPREHEN METABOLIC PANEL: CPT | Performed by: PHYSICIAN ASSISTANT

## 2020-08-04 PROCEDURE — G0378 HOSPITAL OBSERVATION PER HR: HCPCS

## 2020-08-04 PROCEDURE — 25010000002 MIDAZOLAM PER 1 MG: Performed by: ANESTHESIOLOGY

## 2020-08-04 PROCEDURE — 96361 HYDRATE IV INFUSION ADD-ON: CPT

## 2020-08-04 PROCEDURE — 25010000002 KETOROLAC TROMETHAMINE PER 15 MG: Performed by: ANESTHESIOLOGY

## 2020-08-04 PROCEDURE — 85025 COMPLETE CBC W/AUTO DIFF WBC: CPT | Performed by: PHYSICIAN ASSISTANT

## 2020-08-04 PROCEDURE — 25010000002 ONDANSETRON PER 1 MG: Performed by: ANESTHESIOLOGY

## 2020-08-04 PROCEDURE — 25010000002 MORPHINE PER 10 MG: Performed by: EMERGENCY MEDICINE

## 2020-08-04 PROCEDURE — 74176 CT ABD & PELVIS W/O CONTRAST: CPT

## 2020-08-04 PROCEDURE — C2617 STENT, NON-COR, TEM W/O DEL: HCPCS | Performed by: UROLOGY

## 2020-08-04 PROCEDURE — 25010000002 PROPOFOL 10 MG/ML EMULSION: Performed by: ANESTHESIOLOGY

## 2020-08-04 PROCEDURE — 25010000002 ONDANSETRON PER 1 MG: Performed by: EMERGENCY MEDICINE

## 2020-08-04 PROCEDURE — 96376 TX/PRO/DX INJ SAME DRUG ADON: CPT

## 2020-08-04 PROCEDURE — 81001 URINALYSIS AUTO W/SCOPE: CPT | Performed by: PHYSICIAN ASSISTANT

## 2020-08-04 PROCEDURE — C1758 CATHETER, URETERAL: HCPCS | Performed by: UROLOGY

## 2020-08-04 PROCEDURE — 96375 TX/PRO/DX INJ NEW DRUG ADDON: CPT

## 2020-08-04 PROCEDURE — 25010000002 FENTANYL CITRATE (PF) 100 MCG/2ML SOLUTION: Performed by: ANESTHESIOLOGY

## 2020-08-04 PROCEDURE — 25010000002 ONDANSETRON PER 1 MG: Performed by: PHYSICIAN ASSISTANT

## 2020-08-04 PROCEDURE — 84145 PROCALCITONIN (PCT): CPT | Performed by: PHYSICIAN ASSISTANT

## 2020-08-04 PROCEDURE — 99284 EMERGENCY DEPT VISIT MOD MDM: CPT

## 2020-08-04 PROCEDURE — 25010000002 KETOROLAC TROMETHAMINE PER 15 MG: Performed by: PHYSICIAN ASSISTANT

## 2020-08-04 PROCEDURE — C1769 GUIDE WIRE: HCPCS | Performed by: UROLOGY

## 2020-08-04 PROCEDURE — 83690 ASSAY OF LIPASE: CPT | Performed by: PHYSICIAN ASSISTANT

## 2020-08-04 PROCEDURE — 99217 PR OBSERVATION CARE DISCHARGE MANAGEMENT: CPT | Performed by: HOSPITALIST

## 2020-08-04 PROCEDURE — 25010000002 DEXAMETHASONE PER 1 MG: Performed by: ANESTHESIOLOGY

## 2020-08-04 DEVICE — URETERAL STENT
Type: IMPLANTABLE DEVICE | Site: URETER | Status: FUNCTIONAL
Brand: PERCUFLEX™ PLUS

## 2020-08-04 RX ORDER — DEXAMETHASONE SODIUM PHOSPHATE 4 MG/ML
INJECTION, SOLUTION INTRA-ARTICULAR; INTRALESIONAL; INTRAMUSCULAR; INTRAVENOUS; SOFT TISSUE AS NEEDED
Status: DISCONTINUED | OUTPATIENT
Start: 2020-08-04 | End: 2020-08-04 | Stop reason: SURG

## 2020-08-04 RX ORDER — ONDANSETRON 2 MG/ML
4 INJECTION INTRAMUSCULAR; INTRAVENOUS EVERY 6 HOURS PRN
Status: DISCONTINUED | OUTPATIENT
Start: 2020-08-04 | End: 2020-08-04 | Stop reason: HOSPADM

## 2020-08-04 RX ORDER — PROPOFOL 10 MG/ML
VIAL (ML) INTRAVENOUS AS NEEDED
Status: DISCONTINUED | OUTPATIENT
Start: 2020-08-04 | End: 2020-08-04 | Stop reason: SURG

## 2020-08-04 RX ORDER — ACETAMINOPHEN 650 MG/1
650 SUPPOSITORY RECTAL EVERY 4 HOURS PRN
Status: DISCONTINUED | OUTPATIENT
Start: 2020-08-04 | End: 2020-08-04

## 2020-08-04 RX ORDER — DOCUSATE SODIUM 100 MG/1
200 CAPSULE, LIQUID FILLED ORAL 2 TIMES DAILY
Status: DISCONTINUED | OUTPATIENT
Start: 2020-08-04 | End: 2020-08-04 | Stop reason: HOSPADM

## 2020-08-04 RX ORDER — ATROPA BELLADONNA AND OPIUM 16.2; 3 MG/1; MG/1
30 SUPPOSITORY RECTAL DAILY PRN
Status: DISCONTINUED | OUTPATIENT
Start: 2020-08-04 | End: 2020-08-04 | Stop reason: HOSPADM

## 2020-08-04 RX ORDER — FENTANYL CITRATE 50 UG/ML
50 INJECTION, SOLUTION INTRAMUSCULAR; INTRAVENOUS
Status: DISCONTINUED | OUTPATIENT
Start: 2020-08-04 | End: 2020-08-04 | Stop reason: HOSPADM

## 2020-08-04 RX ORDER — ONDANSETRON 2 MG/ML
4 INJECTION INTRAMUSCULAR; INTRAVENOUS ONCE
Status: COMPLETED | OUTPATIENT
Start: 2020-08-04 | End: 2020-08-04

## 2020-08-04 RX ORDER — NALOXONE HCL 0.4 MG/ML
0.4 VIAL (ML) INJECTION
Status: DISCONTINUED | OUTPATIENT
Start: 2020-08-04 | End: 2020-08-04 | Stop reason: HOSPADM

## 2020-08-04 RX ORDER — MORPHINE SULFATE 4 MG/ML
4 INJECTION, SOLUTION INTRAMUSCULAR; INTRAVENOUS ONCE
Status: COMPLETED | OUTPATIENT
Start: 2020-08-04 | End: 2020-08-04

## 2020-08-04 RX ORDER — SODIUM CHLORIDE 0.9 % (FLUSH) 0.9 %
10 SYRINGE (ML) INJECTION EVERY 12 HOURS SCHEDULED
Status: DISCONTINUED | OUTPATIENT
Start: 2020-08-04 | End: 2020-08-04 | Stop reason: HOSPADM

## 2020-08-04 RX ORDER — NALOXONE HCL 0.4 MG/ML
0.1 VIAL (ML) INJECTION
Status: DISCONTINUED | OUTPATIENT
Start: 2020-08-04 | End: 2020-08-04 | Stop reason: HOSPADM

## 2020-08-04 RX ORDER — CHOLECALCIFEROL (VITAMIN D3) 125 MCG
5 CAPSULE ORAL NIGHTLY PRN
Status: DISCONTINUED | OUTPATIENT
Start: 2020-08-04 | End: 2020-08-04 | Stop reason: HOSPADM

## 2020-08-04 RX ORDER — ONDANSETRON 2 MG/ML
4 INJECTION INTRAMUSCULAR; INTRAVENOUS ONCE AS NEEDED
Status: DISCONTINUED | OUTPATIENT
Start: 2020-08-04 | End: 2020-08-04 | Stop reason: HOSPADM

## 2020-08-04 RX ORDER — ONDANSETRON 4 MG/1
4 TABLET, FILM COATED ORAL EVERY 6 HOURS PRN
Qty: 12 TABLET | Refills: 0 | Status: SHIPPED | OUTPATIENT
Start: 2020-08-04 | End: 2021-02-23

## 2020-08-04 RX ORDER — CEFAZOLIN SODIUM IN 0.9 % NACL 3 G/100 ML
3 INTRAVENOUS SOLUTION, PIGGYBACK (ML) INTRAVENOUS ONCE
Status: DISCONTINUED | OUTPATIENT
Start: 2020-08-04 | End: 2020-08-04 | Stop reason: HOSPADM

## 2020-08-04 RX ORDER — MORPHINE SULFATE 4 MG/ML
2 INJECTION, SOLUTION INTRAMUSCULAR; INTRAVENOUS ONCE
Status: COMPLETED | OUTPATIENT
Start: 2020-08-04 | End: 2020-08-04

## 2020-08-04 RX ORDER — CEPHALEXIN 500 MG/1
500 CAPSULE ORAL 2 TIMES DAILY
Qty: 10 CAPSULE | Refills: 0 | Status: SHIPPED | OUTPATIENT
Start: 2020-08-04 | End: 2020-08-04 | Stop reason: HOSPADM

## 2020-08-04 RX ORDER — SODIUM CHLORIDE 9 MG/ML
INJECTION, SOLUTION INTRAVENOUS CONTINUOUS PRN
Status: DISCONTINUED | OUTPATIENT
Start: 2020-08-04 | End: 2020-08-04 | Stop reason: SURG

## 2020-08-04 RX ORDER — BISACODYL 10 MG
10 SUPPOSITORY, RECTAL RECTAL DAILY PRN
Status: DISCONTINUED | OUTPATIENT
Start: 2020-08-04 | End: 2020-08-04 | Stop reason: HOSPADM

## 2020-08-04 RX ORDER — HYDROCODONE BITARTRATE AND ACETAMINOPHEN 5; 325 MG/1; MG/1
1 TABLET ORAL EVERY 6 HOURS PRN
Qty: 12 TABLET | Refills: 0 | Status: SHIPPED | OUTPATIENT
Start: 2020-08-04 | End: 2020-08-04 | Stop reason: HOSPADM

## 2020-08-04 RX ORDER — ALUMINA, MAGNESIA, AND SIMETHICONE 2400; 2400; 240 MG/30ML; MG/30ML; MG/30ML
15 SUSPENSION ORAL EVERY 6 HOURS PRN
Status: DISCONTINUED | OUTPATIENT
Start: 2020-08-04 | End: 2020-08-04 | Stop reason: HOSPADM

## 2020-08-04 RX ORDER — ACETAMINOPHEN 325 MG/1
650 TABLET ORAL EVERY 4 HOURS PRN
Status: DISCONTINUED | OUTPATIENT
Start: 2020-08-04 | End: 2020-08-04

## 2020-08-04 RX ORDER — KETOROLAC TROMETHAMINE 30 MG/ML
30 INJECTION, SOLUTION INTRAMUSCULAR; INTRAVENOUS ONCE
Status: COMPLETED | OUTPATIENT
Start: 2020-08-04 | End: 2020-08-04

## 2020-08-04 RX ORDER — ONDANSETRON 4 MG/1
4 TABLET, FILM COATED ORAL EVERY 6 HOURS PRN
Status: DISCONTINUED | OUTPATIENT
Start: 2020-08-04 | End: 2020-08-04 | Stop reason: HOSPADM

## 2020-08-04 RX ORDER — ATROPA BELLADONNA AND OPIUM 16.2; 3 MG/1; MG/1
30 SUPPOSITORY RECTAL DAILY PRN
Qty: 4 EACH | Refills: 0 | Status: SHIPPED | OUTPATIENT
Start: 2020-08-04 | End: 2020-08-04 | Stop reason: HOSPADM

## 2020-08-04 RX ORDER — MIDAZOLAM HYDROCHLORIDE 1 MG/ML
INJECTION INTRAMUSCULAR; INTRAVENOUS AS NEEDED
Status: DISCONTINUED | OUTPATIENT
Start: 2020-08-04 | End: 2020-08-04 | Stop reason: SURG

## 2020-08-04 RX ORDER — ONDANSETRON 2 MG/ML
4 INJECTION INTRAMUSCULAR; INTRAVENOUS EVERY 6 HOURS PRN
Status: DISCONTINUED | OUTPATIENT
Start: 2020-08-04 | End: 2020-08-04

## 2020-08-04 RX ORDER — ONDANSETRON 4 MG/1
4 TABLET, FILM COATED ORAL EVERY 6 HOURS PRN
Status: DISCONTINUED | OUTPATIENT
Start: 2020-08-04 | End: 2020-08-04

## 2020-08-04 RX ORDER — SODIUM CHLORIDE 0.9 % (FLUSH) 0.9 %
10 SYRINGE (ML) INJECTION AS NEEDED
Status: DISCONTINUED | OUTPATIENT
Start: 2020-08-04 | End: 2020-08-04 | Stop reason: HOSPADM

## 2020-08-04 RX ORDER — SODIUM CHLORIDE 9 MG/ML
100 INJECTION, SOLUTION INTRAVENOUS CONTINUOUS
Status: DISCONTINUED | OUTPATIENT
Start: 2020-08-04 | End: 2020-08-04 | Stop reason: HOSPADM

## 2020-08-04 RX ORDER — ACETAMINOPHEN 160 MG/5ML
650 SOLUTION ORAL EVERY 4 HOURS PRN
Status: DISCONTINUED | OUTPATIENT
Start: 2020-08-04 | End: 2020-08-04 | Stop reason: HOSPADM

## 2020-08-04 RX ORDER — ACETAMINOPHEN 650 MG/1
650 SUPPOSITORY RECTAL EVERY 4 HOURS PRN
Status: DISCONTINUED | OUTPATIENT
Start: 2020-08-04 | End: 2020-08-04 | Stop reason: HOSPADM

## 2020-08-04 RX ORDER — ACETAMINOPHEN 325 MG/1
650 TABLET ORAL EVERY 4 HOURS PRN
Status: DISCONTINUED | OUTPATIENT
Start: 2020-08-04 | End: 2020-08-04 | Stop reason: HOSPADM

## 2020-08-04 RX ORDER — MORPHINE SULFATE 4 MG/ML
4 INJECTION, SOLUTION INTRAMUSCULAR; INTRAVENOUS
Status: DISCONTINUED | OUTPATIENT
Start: 2020-08-04 | End: 2020-08-04 | Stop reason: HOSPADM

## 2020-08-04 RX ORDER — MEPERIDINE HYDROCHLORIDE 25 MG/ML
12.5 INJECTION INTRAMUSCULAR; INTRAVENOUS; SUBCUTANEOUS
Status: DISCONTINUED | OUTPATIENT
Start: 2020-08-04 | End: 2020-08-04 | Stop reason: HOSPADM

## 2020-08-04 RX ORDER — PSEUDOEPHEDRINE HCL 30 MG
200 TABLET ORAL 2 TIMES DAILY
Qty: 28 EACH | Refills: 0 | Status: SHIPPED | OUTPATIENT
Start: 2020-08-04 | End: 2021-02-23

## 2020-08-04 RX ORDER — HYDROMORPHONE HCL 110MG/55ML
0.5 PATIENT CONTROLLED ANALGESIA SYRINGE INTRAVENOUS
Status: DISCONTINUED | OUTPATIENT
Start: 2020-08-04 | End: 2020-08-04 | Stop reason: HOSPADM

## 2020-08-04 RX ORDER — PANTOPRAZOLE SODIUM 40 MG/1
40 TABLET, DELAYED RELEASE ORAL DAILY
Status: DISCONTINUED | OUTPATIENT
Start: 2020-08-04 | End: 2020-08-04 | Stop reason: HOSPADM

## 2020-08-04 RX ORDER — SODIUM CHLORIDE 0.9 % (FLUSH) 0.9 %
10 SYRINGE (ML) INJECTION AS NEEDED
Status: DISCONTINUED | OUTPATIENT
Start: 2020-08-04 | End: 2020-08-05 | Stop reason: HOSPADM

## 2020-08-04 RX ORDER — ONDANSETRON 2 MG/ML
INJECTION INTRAMUSCULAR; INTRAVENOUS AS NEEDED
Status: DISCONTINUED | OUTPATIENT
Start: 2020-08-04 | End: 2020-08-04 | Stop reason: SURG

## 2020-08-04 RX ORDER — MAGNESIUM HYDROXIDE 1200 MG/15ML
LIQUID ORAL AS NEEDED
Status: DISCONTINUED | OUTPATIENT
Start: 2020-08-04 | End: 2020-08-04 | Stop reason: HOSPADM

## 2020-08-04 RX ORDER — HYDROCODONE BITARTRATE AND ACETAMINOPHEN 7.5; 325 MG/1; MG/1
2 TABLET ORAL EVERY 4 HOURS PRN
Status: DISCONTINUED | OUTPATIENT
Start: 2020-08-04 | End: 2020-08-04 | Stop reason: HOSPADM

## 2020-08-04 RX ORDER — KETOROLAC TROMETHAMINE 30 MG/ML
INJECTION, SOLUTION INTRAMUSCULAR; INTRAVENOUS AS NEEDED
Status: DISCONTINUED | OUTPATIENT
Start: 2020-08-04 | End: 2020-08-04 | Stop reason: SURG

## 2020-08-04 RX ORDER — FENTANYL CITRATE 50 UG/ML
INJECTION, SOLUTION INTRAMUSCULAR; INTRAVENOUS AS NEEDED
Status: DISCONTINUED | OUTPATIENT
Start: 2020-08-04 | End: 2020-08-04 | Stop reason: SURG

## 2020-08-04 RX ADMIN — SERTRALINE HYDROCHLORIDE 50 MG: 50 TABLET, FILM COATED ORAL at 12:57

## 2020-08-04 RX ADMIN — MIDAZOLAM 2 MG: 1 INJECTION INTRAMUSCULAR; INTRAVENOUS at 09:51

## 2020-08-04 RX ADMIN — MORPHINE SULFATE 2 MG: 4 INJECTION INTRAVENOUS at 23:59

## 2020-08-04 RX ADMIN — DEXAMETHASONE SODIUM PHOSPHATE 4 MG: 4 INJECTION, SOLUTION INTRAMUSCULAR; INTRAVENOUS at 09:51

## 2020-08-04 RX ADMIN — ONDANSETRON 4 MG: 2 INJECTION INTRAMUSCULAR; INTRAVENOUS at 07:28

## 2020-08-04 RX ADMIN — KETOROLAC TROMETHAMINE 30 MG: 30 INJECTION, SOLUTION INTRAMUSCULAR at 07:27

## 2020-08-04 RX ADMIN — ONDANSETRON 4 MG: 2 INJECTION INTRAMUSCULAR; INTRAVENOUS at 23:59

## 2020-08-04 RX ADMIN — MORPHINE SULFATE 4 MG: 4 INJECTION INTRAVENOUS at 08:52

## 2020-08-04 RX ADMIN — FENTANYL CITRATE 100 MCG: 50 INJECTION, SOLUTION INTRAMUSCULAR; INTRAVENOUS at 09:51

## 2020-08-04 RX ADMIN — ONDANSETRON 4 MG: 2 INJECTION INTRAMUSCULAR; INTRAVENOUS at 09:54

## 2020-08-04 RX ADMIN — CEFAZOLIN SODIUM 3 G: 1 INJECTION, POWDER, FOR SOLUTION INTRAMUSCULAR; INTRAVENOUS at 09:56

## 2020-08-04 RX ADMIN — DOCUSATE SODIUM 200 MG: 100 CAPSULE, LIQUID FILLED ORAL at 12:57

## 2020-08-04 RX ADMIN — PANTOPRAZOLE SODIUM 40 MG: 40 TABLET, DELAYED RELEASE ORAL at 12:57

## 2020-08-04 RX ADMIN — KETOROLAC TROMETHAMINE 30 MG: 30 INJECTION, SOLUTION INTRAMUSCULAR at 10:02

## 2020-08-04 RX ADMIN — SODIUM CHLORIDE 100 ML/HR: 900 INJECTION, SOLUTION INTRAVENOUS at 12:56

## 2020-08-04 RX ADMIN — ONDANSETRON 4 MG: 2 INJECTION INTRAMUSCULAR; INTRAVENOUS at 08:52

## 2020-08-04 RX ADMIN — SODIUM CHLORIDE: 0.9 INJECTION, SOLUTION INTRAVENOUS at 09:41

## 2020-08-04 RX ADMIN — PROPOFOL 200 MG: 10 INJECTION, EMULSION INTRAVENOUS at 09:51

## 2020-08-04 RX ADMIN — SODIUM CHLORIDE 1000 ML: 900 INJECTION, SOLUTION INTRAVENOUS at 07:27

## 2020-08-04 NOTE — OP NOTE
CYSTOSCOPY URETEROSCOPY RETROGRADE PYELOGRAM HOLMIUM LASER STENT INSERTION  Procedure Report    Patient Name:  Amos Powell  YOB: 1995    Date of Surgery:  8/4/2020     Indications: Right ureteral calculi    Pre-op Diagnosis:   Right ureteral calculi       Post-Op Diagnosis Codes:  Same with UTI    Procedure/CPT® Codes:      Procedure(s):  CYSTOSCOPY right  STENT INSERTION    Staff:  Surgeon(s):  Delano Hester MD            was responsible for performing the following activities: Irrigation and their skilled assistance was necessary for the success of this case.    Anesthesia: General    Estimated Blood Loss: none    Implants:    Nothing was implanted during the procedure    Specimen:          None      Findings: Patient had a UTI so we just placed a stent.  He will need right ureteral lithotripsy next week as an outpatient but can go home today    Complications: None    Description of Procedure: Patient was taken to the operating room laid supine position where general trach anesthesia was induced.  Then he is placed in a comfortable dorsal thigh position where his groin areas prepped draped usual sterile fashion.  Cystoscopy is carried out finding a normal urethra normal prostate normal bladder.  A guidewire was placed up the right ureter to level the kidney under fluoroscopic guidance.  There was a lot of debris that came out around the guidewire suspicious of a UTI so we just placed a 6 x 24 double-J stent with a good curl in the kidney good curl in the bladder.  Plan is as above      Delano Hester MD     Date: 8/4/2020  Time: 09:59

## 2020-08-04 NOTE — ANESTHESIA PREPROCEDURE EVALUATION
Anesthesia Evaluation     Patient summary reviewed and Nursing notes reviewed   NPO Solid Status: > 8 hours  NPO Liquid Status: > 8 hours           Airway   Mallampati: II  TM distance: >3 FB  Neck ROM: full  No difficulty expected  Dental - normal exam     Pulmonary - normal exam   Cardiovascular - normal exam        Neuro/Psych  GI/Hepatic/Renal/Endo    (+) morbid obesity, GERD,  renal disease,     Musculoskeletal     Abdominal   (+) obese,     Bowel sounds: normal.   Substance History      OB/GYN          Other                        Anesthesia Plan    ASA 3     general     intravenous induction     Anesthetic plan, all risks, benefits, and alternatives have been provided, discussed and informed consent has been obtained with: patient.

## 2020-08-04 NOTE — H&P
[facility] Medicine Services      Patient Name: Amos Powell  : 1995  MRN: [id number]  Primary Care Physician: Nabil Jason MD  Date of admission: 2020    Patient Care Team:  Nabil Jason MD as PCP - General (Family Medicine)          Subjective   History Present Illness     Chief Complaint:   Chief Complaint   Patient presents with   • Flank Pain       Mr. Powell is a 25 y.o. male who identifies as female with a history of kidney stones, GERD, and depression with anxiety who presents to [facility] ED on 2020 complaining of right flank pain, nausea and vomiting. Workup in the ER revealed an 8 mm obstructing stone in the right ureteropelvic junction with moderate hydronephrosis. The patient's urinalysis had large blood with 31-50 RBCs, 3-5 WBCs, no bacteria, and 3-6 epithelial cells; culture did not reflex. The Hospitalist team was asked to admit the patient for observation and further treatment. Urology was consulted and took the patient to surgery for cystoscopy with right stent insertion.     The patient's symptoms have resolved.          Review of Systems   Constitution: Negative for chills and fever.   Gastrointestinal: Positive for nausea and vomiting.   Genitourinary: Positive for flank pain.   All other systems reviewed and are negative.        Personal History     Past Medical History:   Past Medical History:   Diagnosis Date   • Constipation due to opioid therapy 2016   • Depression with anxiety 2020   • Gastroesophageal reflux disease without esophagitis 2020   • Gender dysphoria    • Hormonal imbalance in transgender patient 2020   • Kidney calculi    • Obesity, Class III, BMI 40-49.9 (morbid obesity) (CMS/HCC) 2020       Surgical History:      Past Surgical History:   Procedure Laterality Date   • CYSTOSCOPY BLADDER STONE LITHOTRIPSY     • WISDOM TOOTH EXTRACTION Bilateral     top and bottom       Family History:  "family history includes Diabetes type II in his paternal grandfather; Hypertension in his maternal grandmother. Otherwise pertinent FHx was reviewed and unremarkable.     Social History:  reports that he has never smoked. He has never used smokeless tobacco. He reports that he drinks about 2.0 standard drinks of alcohol per week. He reports that he does not use drugs.      Medications:  Prior to Admission medications    Medication Sig Start Date End Date Taking? Authorizing Provider   acetaZOLAMIDE (DIAMOX) 500 MG capsule Take 500 mg by mouth Daily. 11/19/19  Yes Charo Harding MD   pantoprazole (Protonix) 20 MG EC tablet Take 1 tablet by mouth Daily. 5/21/20  Yes Nabil Jason MD   progesterone (PROMETRIUM) 100 MG capsule Take 1 capsule by mouth Daily for 180 days. 5/21/20 11/17/20 Yes Nabil Jason MD   sertraline (ZOLOFT) 50 MG tablet Take 1 tablet by mouth Daily for 90 days. 5/21/20 8/19/20 Yes Nabil Jason MD   spironolactone (ALDACTONE) 50 MG tablet Take 1 tablet by mouth Daily for 180 days. 5/21/20 11/17/20 Yes Nabil Jason MD   estradiol valerate (DELESTROGEN) 40 MG/ML injection Inject 40 mg into the appropriate muscle as directed by prescriber 2 (Two) Times a Week. Guillermo 10/21/19   Charo Harding MD   B-D INSULIN SYRINGE 1CC/25GX1\" 25G X 1\" 1 ML misc USE TWICE A WEEK 12/11/19 8/4/20  Charo Harding MD   HYDROcodone-acetaminophen (NORCO) 7.5-325 MG per tablet Take 1 tablet by mouth Every 6 (Six) Hours As Needed for Moderate Pain . 6/7/20 8/4/20  Sri Kessler NP   ondansetron ODT (ZOFRAN-ODT) 4 MG disintegrating tablet Place 1 tablet on the tongue Every 8 (Eight) Hours As Needed for Nausea or Vomiting. 6/7/20 8/4/20  Sri Kessler NP   tamsulosin (FLOMAX) 0.4 MG capsule 24 hr capsule Take 1 capsule by mouth Every Night. 6/7/20 8/4/20  Long, Sri M, NP       Allergies:  No Known Allergies      Objective   Objective     Vital Signs  Temp:  " [97 °F (36.1 °C)-98.4 °F (36.9 °C)] 97.9 °F (36.6 °C)  Heart Rate:  [] 99  Resp:  [14-21] 18  BP: (117-144)/() 118/77  SpO2:  [93 %-98 %] 96 %  on  Flow (L/min):  [6] 6;   Device (Oxygen Therapy): room air  Body mass index is 46.88 kg/m².    Physical Exam   Constitutional: He is oriented to person, place, and time. He appears well-developed.   Morbidly obese   HENT:   Head: Normocephalic and atraumatic.   Mouth/Throat: Oropharynx is clear and moist.   Eyes: Pupils are equal, round, and reactive to light. Conjunctivae and EOM are normal.   Neck: Normal range of motion. Neck supple.   Cardiovascular: Normal rate, regular rhythm, normal heart sounds and intact distal pulses.   Pulmonary/Chest: Effort normal and breath sounds normal.   Abdominal: Soft. Bowel sounds are normal. He exhibits no distension. There is no tenderness.   Musculoskeletal: Normal range of motion. He exhibits no edema.   Neurological: He is alert and oriented to person, place, and time.   Skin: Skin is warm and dry. Capillary refill takes less than 2 seconds.   Psychiatric: He has a normal mood and affect. His behavior is normal.   Vitals reviewed.        Results Review:  I have personally reviewed most recent lab results, microbiology results and radiology images and interpretations and agree with findings.    Results from last 7 days   Lab Units 08/04/20  0730   WBC 10*3/mm3 12.90*   HEMOGLOBIN g/dL 13.1   HEMATOCRIT % 39.5   PLATELETS 10*3/mm3 482*     Results from last 7 days   Lab Units 08/04/20  0730   SODIUM mmol/L 140   POTASSIUM mmol/L 3.5   CHLORIDE mmol/L 105   CO2 mmol/L 20.0*   BUN  10   CREATININE mg/dL 0.70*   GLUCOSE mg/dL 115*   CALCIUM mg/dL 9.2   ALT (SGPT) U/L 46*   AST (SGOT) U/L 34     Estimated Creatinine Clearance: 228.2 mL/min (A) (by C-G formula based on SCr of 0.7 mg/dL (L)).  Brief Urine Lab Results  (Last result in the past 365 days)      Color   Clarity   Blood   Leuk Est   Nitrite   Protein   CREAT   Urine  HCG        08/04/20 0740 Yellow Clear Large (3+) Negative Negative Negative               Microbiology Results (last 10 days)     ** No results found for the last 240 hours. **          ECG/EMG Results (most recent)     None          Ct Abdomen Pelvis Without Contrast    Result Date: 8/4/2020   1. 8 mm obstructing stone at the right ureteropelvic junction with moderate hydronephrosis. 2. Diffuse hepatic steatosis. 3. Haziness in the mesentery which is a nonspecific finding and appears unchanged from the patient's previous scan of 6/6/2020.  Electronically Signed By-Brant Vasquez On:8/4/2020 8:17 AM This report was finalized on [id number] by  Brant Vasquez, .      Estimated Creatinine Clearance: 228.2 mL/min (A) (by C-G formula based on SCr of 0.7 mg/dL (L)).      Assessment/Plan   Assessment/Plan       Active Hospital Problems    Diagnosis  POA   • **Hydronephrosis with ureteropelvic junction (UPJ) obstruction [Q62.11]  Yes     Priority: High   • Obesity, Class III, BMI 40-49.9 (morbid obesity) (CMS/HCC) [E66.01]  Yes   • Gastroesophageal reflux disease without esophagitis [K21.9]  Yes   • Depression with anxiety [F41.8]  Yes   • Hormonal imbalance in transgender patient [E34.9, F64.0]  Yes   • Constipation due to opioid therapy [K59.03, T40.2X5A]  Yes      Resolved Hospital Problems    Diagnosis Date Resolved POA   • Nausea and vomiting [R11.2] 08/04/2020 Yes     Priority: Medium   • Right flank pain [R10.9] 08/04/2020 Yes     Priority: Medium       Hydronephrosis with ureteropelvic junction (UPJ) obstruction  -status post cystoscopy with right stent insertion (08/04/2020)  -urology planning outpatient follow up in 1 week for lithotripsy  -PRN analgesia and antiemetics    Constipation due to opioid therapy  -Colace BID    Hormonal imbalance in transgender patient  -on Diamox, progesterone, spironolactone, and estradiol at home    Depression with anxiety  -continue Zoloft     Gastroesophageal reflux disease without  esophagitis  -continue PPI    Morbid obesity  -BMI 46.88  -encourage lifestyle modifications           VTE Prophylaxis -   Mechanical Order History:      Ordered        08/04/20 1202  Place Sequential Compression Device  Once         08/04/20 1202  Maintain Sequential Compression Device  Continuous         08/04/20 0951  Place Venous Foot Pump  Once         08/04/20 0951  Maintain Venous Foot Pump  Once                 Pharmalogical Order History:     None          CODE STATUS:    Code Status and Medical Interventions:   Ordered at: 08/04/20 1131     Code Status:    CPR     Medical Interventions (Level of Support Prior to Arrest):    Full       This patient has been examined wearing appropriate Personal Protective Equipment. 08/04/20      I discussed the patient's findings and my recommendations with patient.        Electronically signed by MICHEL Will, 08/04/20, 12:03 PM.  [facility] Hospitalist Team

## 2020-08-04 NOTE — PLAN OF CARE
Problem: Patient Care Overview  Goal: Plan of Care Review  Outcome: Outcome(s) achieved  Flowsheets (Taken 8/4/2020 1413)  Outcome Summary: Patient transferred from PACU after surgery. Cleared by  to be discharged. Dr. Griffith seen and discharged patient home. No c/o pain while on this floor. Voided and ate regular food for lunch. Pleasant and cooperative.

## 2020-08-04 NOTE — OUTREACH NOTE
Prep Survey      Responses   [facility] facility patient discharged from?  [facility]   Is LACE score < 7 ?  Yes   Eligibility  [facility]   Date of Admission  08/04/20   Date of Discharge  08/04/20   Discharge Disposition  Home or Self Care   Discharge diagnosis  Hydronephrosis with ureteropelvic junction (UPJ) obstruction cystoscopy with right stent insertion.   COVID-19 Test Status  Not tested   Does the patient have one of the following disease processes/diagnoses(primary or secondary)?  General Surgery   Does the patient have Home health ordered?  No   Is there a DME ordered?  No   Prep survey completed?  Yes          Zoey Gordon RN         changes. VK/pji     Pain    Patient describes pain to be constnat left shoulder pain uip into his upper trap and neck. Has pain into his upper chest, then slants down across his fx ribs both ant and post.  Patient reports  7/10 pain at rest,  10/10 pain with movement. Worsened by  -  Getting dressed/undressed, walking, lying still for no reason. Improved by - pain  Current Functional Limitations:  Not doing much  PLOF:  Fully indep  Pt. unable to tolerate laying on side of pain, fixing hair, reaching overhead, washing opposite shoulder, nor tucking in shirt due to pain.     Patient goal for therapy: move his arm a little and pain relief.          Exercises:   Exercise/Equipment Resistance/Repetitions Other comments          PROM L shoulders  23'     table slides flex   1-3 x 10     pendulum hang if tolerated. 2-3x10-30\" as tolerated Will defer due to c/o                                                                                                          Therapeutic Exercise:   min   MAY TRY TA, TA WITH MARCH, STANDING HIP ABD WITH TA, MAYBE SIDELYING OR SEATED VERY GENTLE SCAP MOBILITY    Group Therapy:      Home Exercise Program:      Therapeutic Activity:      Neuromuscular Re-education:      Gait:      Manual Therapy: 36  min    Tolerated better today (left hp on traps/ribs while ranging shoulder also)    Very limited due to pain   GH JT MOB  STM to upper arm  PROM supine   IR [email or web address],  ER 12* cramp,  ext 10,   abd 55   Flex 47       Modalities: 10'  HP while taking subjective      Timed Code Treatment Minutes:  36    Total Treatment Minutes:    39 +HP    2 man     [id number]    Medicare Cap Total YTD:      Treatment/Activity Tolerance:  Nothing really helped today  [] Patient tolerated treatment well [] Patient limited by fatigue   [x] Patient limited by pain [] Patient limited by other medical complications   [x] Other: multiple fx.     Prognosis: [x] Good [x] Fair  [] Poor    Patient Requires Follow-up:  [x] Yes  [] No    Plan: [x] Continue per plan of care [] Alter current plan (see comments)   [] Plan of care initiated [] Hold pending MD visit [] Discharge    Plan for Next Session: PROM, advance ex     See Progress Note: [] Yes  [x] No       Next due: For MD   Electronically signed by:  Chris Mon, [email or web address]    Outpatient Physical Therapy  Progress Note        Progress Note covers period from:    3/15/19-3/19/19         Objective:  Observation:  Test measurements:     C/o popping and grinding of ribs with some of the motions especially ER and popping with IR  IR [email or web address],  ER 10,  ext 10,   abd 45, Flex 45     Functional Outcome Measure:  Measure used:  SPADI 3/15/19  Score:  119  % Disability:  90%          Assessment:  Summary: pt has been seen for 5 visits with very limited progress due to pain. Patient's response to treatment: limited due to pain, burning, popping c/o     Goals:GOALS  GCode:    Short Term Goals ( 2   weeks) Long Term Goals (  6-8 weeks)   1). establis HEP 1). (I) HEP   2). teach PROM to spouse 2). advance program to AROM and strengthening as shoulder permits   3). 3).   4). 4). · Progress toward previous goals: started HEP and inst wife in PROM     Plan:  · Continue per plan of care unless otherwise advised.         Electronically Signed by: Chris Mon, [id number]

## 2020-08-04 NOTE — ANESTHESIA POSTPROCEDURE EVALUATION
Patient: Amos Powell    Procedure Summary     Date:  08/04/20 Room / Location:  [facility] OR 01 / [facility] MAIN OR    Anesthesia Start:  0944 Anesthesia Stop:  1022    Procedure:  CYSTOSCOPY WITH RIGHT URETERAL STENT PLACEMENT (Right ) Diagnosis:      Surgeon:  Delano Hester MD Provider:  Trevor Chang MD    Anesthesia Type:  general ASA Status:  3          Anesthesia Type: general    Vitals  Vitals Value Taken Time   /52 8/4/2020 10:35 AM   Temp 97.7 °F (36.5 °C) 8/4/2020 10:23 AM   Pulse 104 8/4/2020 10:39 AM   Resp 18 8/4/2020 10:33 AM   SpO2 95 % 8/4/2020 10:39 AM   Vitals shown include unvalidated device data.        Post Anesthesia Care and Evaluation    Patient location during evaluation: PACU  Patient participation: complete - patient participated  Level of consciousness: awake  Pain scale: See nurse's notes for pain score.  Pain management: adequate  Airway patency: patent  Anesthetic complications: No anesthetic complications  PONV Status: none  Cardiovascular status: acceptable  Respiratory status: acceptable  Hydration status: acceptable    Comments: Patient seen and examined postoperatively; vital signs stable; SpO2 greater than or equal to 90%; cardiopulmonary status stable; nausea/vomiting adequately controlled; pain adequately controlled; no apparent anesthesia complications; patient discharged from anesthesia care when discharge criteria were met

## 2020-08-04 NOTE — CONSULTS
Urology Consult Note    Patient:Amos Powell :1995  Room:[facility] MAIN OR/MAIN OR  Admit Date2020  Age:25 y.o.     SEX:male     DOS:2020     MR:[id number]     Visit:[id number]       Attending: Marizol Griffith MD  Referring Provider: Dr Griffith  Reason for Consultation: Right ureteral stone    Patient Care Team:  Nabil Jason MD as PCP - General (Family Medicine)    Chief complaint right flank pain    Subjective .     History of present illness: Patient is a 25-year-old white male who has a history of stones who developed severe right flank pain rating down to his groin area recently.  The pain was so severe he had to come to the emergency room and rated it a 9 out of 10.  He has no associated fevers.  I reviewed his CT and he has a 8 mm right ureteral stone causing obstruction    Review of Systems  12 point review of systems were reviewed and are negative except for what is in HPI.    History  Past Medical History:   Diagnosis Date   • Depression    • Gender dysphoria    • Kidney calculi      Past Surgical History:   Procedure Laterality Date   • CYSTOSCOPY BLADDER STONE LITHOTRIPSY     • WISDOM TOOTH EXTRACTION Bilateral     top and bottom     Social History     Socioeconomic History   • Marital status: Single     Spouse name: Not on file   • Number of children: Not on file   • Years of education: Not on file   • Highest education level: Not on file   Tobacco Use   • Smoking status: Never Smoker   Substance and Sexual Activity   • Alcohol use: Yes     Alcohol/week: 2.0 standard drinks     Types: 2 Cans of beer per week   • Drug use: Never   • Sexual activity: Yes     Partners: Female     Birth control/protection: Condom     Family History   Problem Relation Age of Onset   • Hypertension Maternal Grandmother    • Diabetes type II Paternal Grandfather      No Known Allergies  Prior to Admission medications    Medication Sig Start Date End Date Taking? Authorizing Provider   acetaZOLAMIDE (DIAMOX) 500  "MG capsule Take 500 mg by mouth Daily. 19  Yes Charo Harding MD   pantoprazole (Protonix) 20 MG EC tablet Take 1 tablet by mouth Daily. 20  Yes Nabil Jason MD   progesterone (PROMETRIUM) 100 MG capsule Take 1 capsule by mouth Daily for 180 days. 20 Yes Nabil Jason MD   sertraline (ZOLOFT) 50 MG tablet Take 1 tablet by mouth Daily for 90 days. 20 Yes Nabil Jason MD   spironolactone (ALDACTONE) 50 MG tablet Take 1 tablet by mouth Daily for 180 days. 20 Yes Nabil Jason MD   estradiol valerate (DELESTROGEN) 40 MG/ML injection Inject 40 mg into the appropriate muscle as directed by prescriber 2 (Two) Times a Week. Guillermo 10/21/19   Charo Harding MD   B-D INSULIN SYRINGE 1CC/25GX1\" 25G X 1\" 1 ML misc USE TWICE A WEEK 19  Charo Harding MD   HYDROcodone-acetaminophen (NORCO) 7.5-325 MG per tablet Take 1 tablet by mouth Every 6 (Six) Hours As Needed for Moderate Pain . 20  Sri Kessler NP   ondansetron ODT (ZOFRAN-ODT) 4 MG disintegrating tablet Place 1 tablet on the tongue Every 8 (Eight) Hours As Needed for Nausea or Vomiting. 20  Sri Kessler NP   tamsulosin (FLOMAX) 0.4 MG capsule 24 hr capsule Take 1 capsule by mouth Every Night. 20  Sri Kessler NP         Objective     tMax 24 hours:  Temp (24hrs), Av °F (36.7 °C), Min:97.5 °F (36.4 °C), Max:98.4 °F (36.9 °C)    Vital Sign Ranges:  Temp:  [97.5 °F (36.4 °C)-98.4 °F (36.9 °C)] 97.5 °F (36.4 °C)  Heart Rate:  [] 96  Resp:  [14-21] 14  BP: (117-144)/() 117/89  Intake and Output Last 3 Shifts:  No intake/output data recorded.      Physical Exam:     General Appearance:    Alert, cooperative, in no acute distress   Head:    Normocephalic, without obvious abnormality, atraumatic   Eyes:            Lids and lashes normal, conjunctivae and sclerae normal, no   icterus, no pallor, " corneas clear, PERRLA   Ears:    Ears appear intact with no abnormalities noted   Throat:   No oral lesions, no thrush, oral mucosa moist   Neck:   No adenopathy, supple, trachea midline, no thyromegaly, no   carotid bruit, no JVD   Back:     No kyphosis present, no scoliosis present, no skin lesions,      erythema or scars, no tenderness to percussion or                   palpation,   range of motion normal   Lungs:     Clear to auscultation,respirations regular, even and                  unlabored    Heart:    Regular rhythm and normal rate, normal S1 and S2, no            murmur, no gallop, no rub, no click   Chest Wall:    No abnormalities observed   Abdomen:     Normal bowel sounds, no masses, no organomegaly, soft        non-tender, non-distended, no guarding, no rebound                tenderness   Rectal:     Deferred   Extremities:   Moves all extremities well, no edema, no cyanosis, no             redness   Pulses:   Pulses palpable and equal bilaterally   Skin:   No bleeding, bruising or rash   Lymph nodes:   No palpable adenopathy   Neurologic:   Cranial nerves 2 - 12 grossly intact, sensation intact, DTR       present and equal bilaterally       Results Review:     Lab Results (last 24 hours)     Procedure Component Value Units Date/Time    Extra Tubes [[id number]] Collected:  08/04/20 0730    Specimen:  Blood, Venous Line Updated:  08/04/20 0830    Narrative:       The following orders were created for panel order Extra Tubes.  Procedure                               Abnormality         Status                     ---------                               -----------         ------                     Light Blue Top[[id number]]                                   Final result               Gold Top - SST[[id number]]                                   Final result                 Please view results for these tests on the individual orders.    Light Blue Top [[id number]] Collected:  08/04/20 0730    Specimen:   Blood Updated:  08/04/20 0830     Extra Tube hold for add-on     Comment: Auto resulted       Gold Top - SST [[id number]] Collected:  08/04/20 0730    Specimen:  Blood Updated:  08/04/20 0830     Extra Tube Hold for add-ons.     Comment: Auto resulted.       BUN [[id number]]  (Normal) Collected:  08/04/20 0730    Specimen:  Blood Updated:  08/04/20 0817     BUN 10 mg/dL     Comprehensive Metabolic Panel [[id number]]  (Abnormal) Collected:  08/04/20 0730    Specimen:  Blood Updated:  08/04/20 0816     Glucose 115 mg/dL      BUN --     Comment: Testing performed by alternate method        Creatinine 0.70 mg/dL      Sodium 140 mmol/L      Potassium 3.5 mmol/L      Chloride 105 mmol/L      CO2 20.0 mmol/L      Calcium 9.2 mg/dL      Total Protein 7.2 g/dL      Albumin 4.20 g/dL      ALT (SGPT) 46 U/L      AST (SGOT) 34 U/L      Alkaline Phosphatase 62 U/L      Total Bilirubin 0.3 mg/dL      eGFR Non African Amer 137 mL/min/1.73      Globulin 3.0 gm/dL      A/G Ratio 1.4 g/dL      BUN/Creatinine Ratio --     Comment: Testing not performed        Anion Gap 15.0 mmol/L     Narrative:       GFR Normal >60  Chronic Kidney Disease <60  Kidney Failure <15      Lipase [[id number]]  (Normal) Collected:  08/04/20 0730    Specimen:  Blood Updated:  08/04/20 0816     Lipase 23 U/L     Urinalysis With Culture If Indicated - Urine, Clean Catch [[id number]]  (Abnormal) Collected:  08/04/20 0740    Specimen:  Urine, Clean Catch Updated:  08/04/20 0753     Color, UA Yellow     Appearance, UA Clear     pH, UA 5.5     Specific Gravity, UA 1.021     Glucose, UA Negative     Ketones, UA Negative     Bilirubin, UA Negative     Blood, UA Large (3+)     Protein, UA Negative     Leuk Esterase, UA Negative     Nitrite, UA Negative     Urobilinogen, UA 0.2 E.U./dL    Urinalysis, Microscopic Only - Urine, Clean Catch [[id number]]  (Abnormal) Collected:  08/04/20 0740    Specimen:  Urine, Clean Catch Updated:  08/04/20 0753     RBC, UA 31-50 /HPF       WBC, UA 3-5 /HPF      Bacteria, UA None Seen /HPF      Squamous Epithelial Cells, UA 3-6 /HPF      Hyaline Casts, UA 3-6 /LPF      Methodology Automated Microscopy    CBC & Differential [[id number]] Collected:  08/04/20 0730    Specimen:  Blood Updated:  08/04/20 0742    Narrative:       The following orders were created for panel order CBC & Differential.  Procedure                               Abnormality         Status                     ---------                               -----------         ------                     CBC Auto Differential[[id number]]        Abnormal            Final result                 Please view results for these tests on the individual orders.    CBC Auto Differential [[id number]]  (Abnormal) Collected:  08/04/20 0730    Specimen:  Blood Updated:  08/04/20 0742     WBC 12.90 10*3/mm3      RBC 4.56 10*6/mm3      Hemoglobin 13.1 g/dL      Hematocrit 39.5 %      MCV 86.7 fL      MCH 28.7 pg      MCHC 33.2 g/dL      RDW 13.3 %      RDW-SD 40.3 fl      MPV 6.8 fL      Platelets 482 10*3/mm3      Neutrophil % 74.5 %      Lymphocyte % 16.1 %      Monocyte % 7.1 %      Eosinophil % 1.6 %      Basophil % 0.7 %      Neutrophils, Absolute 9.60 10*3/mm3      Lymphocytes, Absolute 2.10 10*3/mm3      Monocytes, Absolute 0.90 10*3/mm3      Eosinophils, Absolute 0.20 10*3/mm3      Basophils, Absolute 0.10 10*3/mm3      nRBC 0.0 /100 WBC          No results found for: URINECX     Imaging Results (Last 7 Days)     Procedure Component Value Units Date/Time    CT Abdomen Pelvis Without Contrast [[id number]] Collected:  08/04/20 0814     Updated:  08/04/20 0819    Narrative:          DATE OF EXAM:  8/4/2020 8:00 AM     PROCEDURE:  CT ABDOMEN PELVIS WO CONTRAST-     INDICATIONS:  Flank pain, stone disease suspected     COMPARISON:  6/6/2020     TECHNIQUE:  Routine transaxial slices were obtained through the abdomen and pelvis  without the administration of intravenous contrast. Reconstructed  coronal and  sagittal images were also obtained. Automated exposure  control and iterative construction methods were used.     FINDINGS:  The lung bases are clear. There is diffuse hepatic steatosis present,  unchanged. The gallbladder is contracted. Spleen, adrenal glands, and  pancreas have a normal appearance. Left kidney is normal. On the right  there is hydronephrosis with an obstructing stone at the right  ureteropelvic junction measuring 8 mm in diameter. Distal ureter is of  normal caliber. No dilated or thickened loops of bowel are seen. There  is a increased attenuation identified within the mesentery which is  present on the patient's previous scan and appears unchanged.        Impression:          1. 8 mm obstructing stone at the right ureteropelvic junction with  moderate hydronephrosis.  2. Diffuse hepatic steatosis.  3. Haziness in the mesentery which is a nonspecific finding and appears  unchanged from the patient's previous scan of 6/6/2020.     Electronically Signed By-Brant Vasquez On:8/4/2020 8:17 AM  This report was finalized on [id number] by  Brant Vasquez, .          Inpatient Meds:   Scheduled Meds:  ceFAZolin 3 g Intravenous Once      Continuous Infusions:    PRN Meds:.[MAR Hold] sodium chloride      Assessment/Plan     Right ureteral calculi    Plan cystoscopy right ureteroscopy laser lithotripsy stent.  Discussed with patient all risk benefits and options he is willing to proceed    I discussed the patients findings and my recommendations with patient.    Delano Hester MD  08/04/20  09:46

## 2020-08-04 NOTE — DISCHARGE SUMMARY
[facility] Medicine Services  DISCHARGE SUMMARY        Prepared For PCP:  Nabil Jason MD    Patient Name: Amos Powell  : 1995  MRN: [id number]      Date of Admission:   2020    Date of Discharge:  2020    Length of stay:  LOS: 0 days     Hospital Course     Presenting Problem:   Flank pain [R10.9]  Hydronephrosis with ureteropelvic junction (UPJ) obstruction [Q62.11]  Nausea and vomiting, intractability of vomiting not specified, unspecified vomiting type [R11.2]      Active Hospital Problems    Diagnosis  POA   • **Hydronephrosis with ureteropelvic junction (UPJ) obstruction [Q62.11]  Yes     Priority: High   • Obesity, Class III, BMI 40-49.9 (morbid obesity) (CMS/[facility]) [E66.01]  Yes   • Gastroesophageal reflux disease without esophagitis [K21.9]  Yes   • Depression with anxiety [F41.8]  Yes   • Hormonal imbalance in transgender patient [E34.9, F64.0]  Yes   • Constipation due to opioid therapy [K59.03, T40.2X5A]  Yes      Resolved Hospital Problems    Diagnosis Date Resolved POA   • Nausea and vomiting [R11.2] 2020 Yes     Priority: Medium   • Right flank pain [R10.9] 2020 Yes     Priority: Medium           Hospital Course:  Amos Powell is a 25 y.o. male who identifies as female with a history of kidney stones, GERD, and depression with anxiety who presents to [facility] ED on 2020 complaining of right flank pain, nausea and vomiting. Workup in the ER revealed an 8 mm obstructing stone in the right ureteropelvic junction with moderate hydronephrosis. The patient's urinalysis had large blood with 31-50 RBCs, 3-5 WBCs, no bacteria, and 3-6 epithelial cells; culture did not reflex. The Hospitalist team was asked to admit the patient for observation and further treatment. Urology was consulted and took the patient to surgery for cystoscopy with right stent insertion.      The patient's symptoms have resolved, but he reports some  constipation. Per urology, there was a lot of debris that came out around the guidewire suspicious of a UTI. He will be started on Bactrim per urology. He will need to follow up with urology as outpatient in 1 week for lithotripsy.       Hydronephrosis with ureteropelvic junction (UPJ) obstruction  -status post cystoscopy with right stent insertion (08/04/2020)  -urology planning outpatient follow up in 1 week for lithotripsy  -PRN analgesia and antiemetics  -Bactrim for suspected UTI per urology      Constipation due to opioid therapy  -Colace BID     Hormonal imbalance in transgender patient  -on Diamox, progesterone, spironolactone, and estradiol at home     Depression with anxiety  -continue Zoloft      Gastroesophageal reflux disease without esophagitis  -continue PPI     Morbid obesity  -BMI 46.88  -encourage lifestyle modifications           Recommendation for Outpatient Providers:     Follow up with urology in 1 week for lithotripsy            Day of Discharge       Vital Signs:   Temp:  [97 °F (36.1 °C)-98.4 °F (36.9 °C)] 97.9 °F (36.6 °C)  Heart Rate:  [] 99  Resp:  [14-21] 18  BP: (117-144)/() 118/77     Physical Exam:  Physical Exam   Constitutional: He is oriented to person, place, and time. He appears well-developed.   Morbidly obese   HENT:   Head: Normocephalic and atraumatic.   Mouth/Throat: Oropharynx is clear and moist.   Eyes: Pupils are equal, round, and reactive to light. Conjunctivae and EOM are normal.   Neck: Normal range of motion. Neck supple.   Cardiovascular: Normal rate, regular rhythm, normal heart sounds and intact distal pulses.   Pulmonary/Chest: Effort normal and breath sounds normal.   Abdominal: Soft. Bowel sounds are normal. He exhibits no distension. There is no tenderness.   Musculoskeletal: Normal range of motion. He exhibits no edema.   Neurological: He is alert and oriented to person, place, and time.   Skin: Skin is warm and dry. Capillary refill takes less  than 2 seconds.   Psychiatric: He has a normal mood and affect. His behavior is normal.   Vitals reviewed.      Pertinent  and/or Most Recent Results     Results from last 7 days   Lab Units 08/04/20  0730   WBC 10*3/mm3 12.90*   HEMOGLOBIN g/dL 13.1   HEMATOCRIT % 39.5   PLATELETS 10*3/mm3 482*   SODIUM mmol/L 140   POTASSIUM mmol/L 3.5   CHLORIDE mmol/L 105   CO2 mmol/L 20.0*   BUN  10   CREATININE mg/dL 0.70*   GLUCOSE mg/dL 115*   CALCIUM mg/dL 9.2     Results from last 7 days   Lab Units 08/04/20  0730   BILIRUBIN mg/dL 0.3   ALK PHOS U/L 62   ALT (SGPT) U/L 46*   AST (SGOT) U/L 34         Brief Urine Lab Results  (Last result in the past 365 days)      Color   Clarity   Blood   Leuk Est   Nitrite   Protein   CREAT   Urine HCG        08/04/20 0740 Yellow Clear Large (3+) Negative Negative Negative               Microbiology Results Abnormal     None          Ct Abdomen Pelvis Without Contrast    Result Date: 8/4/2020  Impression:  1. 8 mm obstructing stone at the right ureteropelvic junction with moderate hydronephrosis. 2. Diffuse hepatic steatosis. 3. Haziness in the mesentery which is a nonspecific finding and appears unchanged from the patient's previous scan of 6/6/2020.  Electronically Signed By-Brant Vasquez On:8/4/2020 8:17 AM This report was finalized on [id number] by  Brant Vasquez, .          Test Results Pending at Discharge    n/a      Procedures Performed  Procedure(s):  CYSTOSCOPY WITH RIGHT URETERAL STENT PLACEMENT         Consults:   Consults     Date and Time Order Name Status Description    8/4/2020 0842 Hospitalist (on-call MD unless specified) Completed     8/4/2020 0824 Urology (on-call MD unless specified) Completed             Discharge Details        Discharge Medications      New Medications      Instructions Start Date   docusate sodium 100 MG capsule   200 mg, Oral, 2 Times Daily      ondansetron 4 MG tablet  Commonly known as:  ZOFRAN   4 mg, Oral, Every 6 Hours PRN         Continue  These Medications      Instructions Start Date   acetaZOLAMIDE 500 MG capsule  Commonly known as:  DIAMOX   500 mg, Oral, Daily      estradiol valerate 40 MG/ML injection  Commonly known as:  DELESTROGEN   40 mg, Intramuscular, 2 Times Weekly, Sunday      pantoprazole 20 MG EC tablet  Commonly known as:  Protonix   20 mg, Oral, Daily      progesterone 100 MG capsule  Commonly known as:  PROMETRIUM   100 mg, Oral, Daily      sertraline 50 MG tablet  Commonly known as:  ZOLOFT   50 mg, Oral, Daily      spironolactone 50 MG tablet  Commonly known as:  ALDACTONE   50 mg, Oral, Daily           Paper scripts written by Dr. Hester given to patient for Norco 7.5/325 mg PO q6h PRN #35, and Bactrim DS 1 PO BID x 10 days, #20        No Known Allergies      Discharge Disposition:  Home or Self Care    Diet:  Hospital:  Diet Order   Procedures   • Diet Regular         Discharge Activity:   Activity Instructions     Activity as Tolerated              CODE STATUS:    Code Status and Medical Interventions:   Ordered at: 08/04/20 6458     Code Status:    CPR     Medical Interventions (Level of Support Prior to Arrest):    Full         Follow-up Appointments  No future appointments.    Additional Instructions for the Follow-ups that You Need to Schedule     Call MD With Problems / Concerns   As directed      Instructions: Call [phone] or email [email or web address] for problems or concerns.    Order Comments:  Instructions: Call [phone] or email [email or web address] for problems or concerns.          Discharge Follow-up with PCP   As directed       Currently Documented PCP:    Nabil Jason MD    PCP Phone Number:    [phone]     Follow Up Details:  2 weeks         Discharge Follow-up with Specified Provider: urologyDr. Hester; 1 Week   As directed      To:  Dr. Kacie lopez    Follow Up:  1 Week                 Condition on Discharge:      Stable      This patient has been examined wearing  appropriate Personal Protective Equipment. 08/04/20      Electronically signed by MICHEL Will, 08/04/20, 12:17 PM.      Time: I spent 24 minutes on this discharge activity which included face-to-face encounter with the patient/reviewing the data in the system/coordination of the care with the nursing staff as well as consultants/documentation/entering orders.

## 2020-08-04 NOTE — ED PROVIDER NOTES
Subjective   Patient is a 25-year-old male who identifies as a female presents with complaints of right flank pain that radiates into his abdomen and groin this that started a few hours ago.  Patient describes as a constant achy type pain he rates 6/10 severity.  Patient reports associated nausea and vomiting denies any hematemesis.  Patient denies any urinary symptoms since his symptoms started but does have a history of kidney stones in the past.  Patient denies any fever body aches or chills chest pain or shortness of breath.  Reports a loose stool earlier today but denies any hematochezia or melena.  Patient denies any recent travel or known sick contacts.  Patient denies any alleviating or exacerbating factors of his pain.          Review of Systems   Constitutional: Negative.    Respiratory: Negative.    Cardiovascular: Negative.    Gastrointestinal: Positive for abdominal pain, nausea and vomiting. Negative for abdominal distention, blood in stool, constipation and diarrhea.   Genitourinary: Negative for decreased urine volume, difficulty urinating, discharge, dysuria, flank pain, hematuria, penile pain, penile swelling, scrotal swelling, testicular pain and urgency.   Musculoskeletal: Negative for neck pain and neck stiffness.   Skin: Negative.    Neurological: Negative.        Past Medical History:   Diagnosis Date   • Depression    • Gender dysphoria    • Kidney calculi        No Known Allergies    Past Surgical History:   Procedure Laterality Date   • CYSTOSCOPY BLADDER STONE LITHOTRIPSY     • WISDOM TOOTH EXTRACTION Bilateral     top and bottom       Family History   Problem Relation Age of Onset   • Hypertension Maternal Grandmother    • Diabetes type II Paternal Grandfather        Social History     Socioeconomic History   • Marital status: Single     Spouse name: Not on file   • Number of children: Not on file   • Years of education: Not on file   • Highest education level: Not on file   Tobacco Use  "  • Smoking status: Never Smoker   Substance and Sexual Activity   • Alcohol use: Yes     Alcohol/week: 2.0 standard drinks     Types: 2 Cans of beer per week   • Drug use: Never   • Sexual activity: Yes     Partners: Female     Birth control/protection: Condom           Objective   Physical Exam   Constitutional: He is oriented to person, place, and time. He appears well-developed and well-nourished. No distress.   HENT:   Head: Normocephalic and atraumatic.   Mouth/Throat: Oropharynx is clear and moist.   Eyes: Pupils are equal, round, and reactive to light. EOM are normal. No scleral icterus.   Cardiovascular: Normal rate, regular rhythm, normal heart sounds and intact distal pulses. Exam reveals no gallop and no friction rub.   No murmur heard.  Pulmonary/Chest: Effort normal and breath sounds normal. No stridor. No respiratory distress. He has no wheezes. He has no rales. He exhibits no tenderness.   Abdominal: Soft. Normal appearance and bowel sounds are normal. He exhibits no distension, no fluid wave, no ascites and no mass. There is no tenderness. There is no rigidity, no rebound, no guarding, no CVA tenderness, no tenderness at McBurney's point and negative Lomas's sign. No hernia.   No significant tenderness noted to palpation in all 4 quadrants of the abdomen or CVA.  Skin is intact there is no surrounding erythema edema or warmth   Neurological: He is alert and oriented to person, place, and time.   Skin: Skin is warm. Capillary refill takes less than 2 seconds. No rash noted. He is not diaphoretic. No erythema. No pallor.   Psychiatric: He has a normal mood and affect. His behavior is normal.   Nursing note and vitals reviewed.      Procedures           ED Course    /81   Pulse 100   Temp 98.4 °F (36.9 °C) (Oral)   Resp 21   Ht 175.3 cm (69\")   Wt (!) 144 kg (317 lb 7.4 oz)   SpO2 98%   BMI 46.88 kg/m²   Medications   sodium chloride 0.9 % flush 10 mL (has no administration in time range) "   Morphine sulfate (PF) injection 4 mg (has no administration in time range)   ondansetron (ZOFRAN) injection 4 mg (has no administration in time range)   sodium chloride 0.9 % bolus 1,000 mL (0 mL Intravenous Stopped 8/4/20 0839)   ondansetron (ZOFRAN) injection 4 mg (4 mg Intravenous Given 8/4/20 0728)   ketorolac (TORADOL) injection 30 mg (30 mg Intravenous Given 8/4/20 07)     Labs Reviewed   COMPREHENSIVE METABOLIC PANEL - Abnormal; Notable for the following components:       Result Value    Glucose 115 (*)     Creatinine 0.70 (*)     CO2 20.0 (*)     ALT (SGPT) 46 (*)     All other components within normal limits    Narrative:     GFR Normal >60  Chronic Kidney Disease <60  Kidney Failure <15     URINALYSIS W/ CULTURE IF INDICATED - Abnormal; Notable for the following components:    Blood, UA Large (3+) (*)     All other components within normal limits   CBC WITH AUTO DIFFERENTIAL - Abnormal; Notable for the following components:    WBC 12.90 (*)     Platelets 482 (*)     Lymphocyte % 16.1 (*)     Neutrophils, Absolute 9.60 (*)     All other components within normal limits   URINALYSIS, MICROSCOPIC ONLY - Abnormal; Notable for the following components:    RBC, UA 31-50 (*)     WBC, UA 3-5 (*)     Squamous Epithelial Cells, UA 3-6 (*)     All other components within normal limits   LIPASE - Normal   BUN - Normal   CBC AND DIFFERENTIAL    Narrative:     The following orders were created for panel order CBC & Differential.  Procedure                               Abnormality         Status                     ---------                               -----------         ------                     CBC Auto Differential[[id number]]        Abnormal            Final result                 Please view results for these tests on the individual orders.   EXTRA TUBES    Narrative:     The following orders were created for panel order Extra Tubes.  Procedure                               Abnormality         Status                      ---------                               -----------         ------                     Light Blue Top[[id number]]                                   Final result               Gold Top - SST[[id number]]                                   Final result                 Please view results for these tests on the individual orders.   LIGHT BLUE TOP   [facility]     Ct Abdomen Pelvis Without Contrast    Result Date: 8/4/2020   1. 8 mm obstructing stone at the right ureteropelvic junction with moderate hydronephrosis. 2. Diffuse hepatic steatosis. 3. Haziness in the mesentery which is a nonspecific finding and appears unchanged from the patient's previous scan of 6/6/2020.  Electronically Signed By-rBant Vasquez On:8/4/2020 8:17 AM This report was finalized on [id number] by  Brant Vasquez, .                                           MDM  Number of Diagnoses or Management Options  Flank pain:   Hydronephrosis with ureteropelvic junction (UPJ) obstruction:   Nausea and vomiting, intractability of vomiting not specified, unspecified vomiting type:   Diagnosis management comments: Chart Review:  Comorbidity: Depression, gender dysphoria, history of kidney stone  Differentials: Hydronephrosis secondary to obstruction, cystitis, UTI, intra-abdominal infection, bowel obstruction, peritonitis, appendicitis      ;this list is not all inclusive and does not constitute the entirety of considered causes  ECG: Not warranted  Labs: CBC shows WC 12.9 hemoglobin 13.1 hematocrit 39.5 platelets 42.  Urinalysis significant for large blood no leukocyte esterase or nitrites or bacteria noted.  CMP shows glucose 115 BUN 10 creatinine 0.7 sodium 140 potassium 3.5.  Lipase 23  Imaging: Was interpreted by physician and reviewed by myself:  Ct Abdomen Pelvis Without Contrast  Result Date: 8/4/2020   1. 8 mm obstructing stone at the right ureteropelvic junction with moderate hydronephrosis. 2. Diffuse hepatic steatosis. 3. Haziness in the  mesentery which is a nonspecific finding and appears unchanged from the patient's previous scan of 6/6/2020.  Electronically Signed By-Brant Vasquez On:8/4/2020 8:17 AM This report was finalized on [id number] by  Brant Vasquez, .    Disposition/Treatment:  Appropriate PPE was worn during exam and throughout all encounters with the patient.  While in the ED patient was afebrile and appeared nontoxic he was given fluids Toradol and Zofran for his pain.  Lab results were significant for an elevated white count of 12.9.  Kidney function unremarkable as above.  Urinalysis was significant for large blood but no bacteria noted.  CT of abdomen and pelvis was significant for an 8 mm obstructing stone in the right UV junction with some moderate hydronephrosis.  Case was discussed with on-call urologist Dr. Hester who states he would like the patient to be admitted.  Spoke to Lupe NP for Dr. Griffith who agreed for admission.  Patient had increased pain he was given morphine and Zofran however he did remain stable throughout ED stay.  Lab work and findings were discussed with patient voiced understanding admission was in agreement with plan.       Amount and/or Complexity of Data Reviewed  Clinical lab tests: reviewed  Tests in the radiology section of CPT®: reviewed        Final diagnoses:   Hydronephrosis with ureteropelvic junction (UPJ) obstruction   Flank pain   Nausea and vomiting, intractability of vomiting not specified, unspecified vomiting type            Xavier Henning PA  08/04/20 0895

## 2020-08-04 NOTE — ANESTHESIA PROCEDURE NOTES
Airway  Urgency: elective    Date/Time: 8/4/2020 9:52 AM  Airway not difficult    General Information and Staff    Patient location during procedure: OR  Anesthesiologist: Sonu Moore MD    Indications and Patient Condition  Indications for airway management: airway protection    Preoxygenated: yes  MILS not maintained throughout  Mask difficulty assessment: 0 - not attempted    Final Airway Details  Final airway type: supraglottic airway      Successful airway: LMA and unique  Size 5    Number of attempts at approach: 1  Assessment: lips, teeth, and gum same as pre-op and atraumatic intubation    Additional Comments  ASA monitors applied; preoxygenated with 100% FiO2 via anesthesia face mask; induction of general anesthesia; patient's position optimized; LMA lubricated with lidocaine jelly and placed; LMA connected to anesthesia circuit; atraumatic/dentition in preoperative condition; LMA secured in place; correct placement confirmed by bilateral chest rise, tube condensation, and return of EtCO2 > 30 mmHg x3

## 2020-08-04 NOTE — ED NOTES
Pt c/o flank pain and n/v for 1 hour. Pain is right flank and radiates down into groin. Hx of kidney stones      Noa Dimas RN  08/04/20 5871

## 2020-08-05 ENCOUNTER — TRANSITIONAL CARE MANAGEMENT TELEPHONE ENCOUNTER (OUTPATIENT)
Dept: CALL CENTER | Facility: HOSPITAL | Age: 25
End: 2020-08-05

## 2020-08-05 ENCOUNTER — READMISSION MANAGEMENT (OUTPATIENT)
Dept: CALL CENTER | Facility: HOSPITAL | Age: 25
End: 2020-08-05

## 2020-08-05 VITALS
OXYGEN SATURATION: 97 % | DIASTOLIC BLOOD PRESSURE: 77 MMHG | RESPIRATION RATE: 18 BRPM | BODY MASS INDEX: 46.6 KG/M2 | TEMPERATURE: 97.7 F | WEIGHT: 314.6 LBS | SYSTOLIC BLOOD PRESSURE: 119 MMHG | HEIGHT: 69 IN | HEART RATE: 103 BPM

## 2020-08-05 PROBLEM — R10.9 RIGHT FLANK PAIN: Status: ACTIVE | Noted: 2020-08-05

## 2020-08-05 PROBLEM — A41.9 SEPSIS: Status: ACTIVE | Noted: 2020-08-05

## 2020-08-05 LAB
ALBUMIN SERPL-MCNC: 4.1 G/DL (ref 3.5–5.2)
ALBUMIN/GLOB SERPL: 1.2 G/DL
ALP SERPL-CCNC: 56 U/L (ref 39–117)
ALT SERPL W P-5'-P-CCNC: 41 U/L (ref 1–41)
ANION GAP SERPL CALCULATED.3IONS-SCNC: 14 MMOL/L (ref 5–15)
ANION GAP SERPL CALCULATED.3IONS-SCNC: 18 MMOL/L (ref 5–15)
AST SERPL-CCNC: 34 U/L (ref 1–40)
BACTERIA UR QL AUTO: ABNORMAL /HPF
BASOPHILS # BLD AUTO: 0 10*3/MM3 (ref 0–0.2)
BASOPHILS NFR BLD AUTO: 0.2 % (ref 0–1.5)
BILIRUB SERPL-MCNC: 0.4 MG/DL (ref 0–1.2)
BILIRUB UR QL STRIP: ABNORMAL
BUN SERPL-MCNC: 10 MG/DL (ref 6–20)
BUN SERPL-MCNC: 11 MG/DL (ref 6–20)
BUN SERPL-MCNC: ABNORMAL MG/DL
BUN SERPL-MCNC: ABNORMAL MG/DL
BUN/CREAT SERPL: ABNORMAL
BUN/CREAT SERPL: ABNORMAL
CALCIUM SPEC-SCNC: 8.6 MG/DL (ref 8.6–10.5)
CALCIUM SPEC-SCNC: 9.1 MG/DL (ref 8.6–10.5)
CHLORIDE SERPL-SCNC: 103 MMOL/L (ref 98–107)
CHLORIDE SERPL-SCNC: 105 MMOL/L (ref 98–107)
CLARITY UR: ABNORMAL
CO2 SERPL-SCNC: 19 MMOL/L (ref 22–29)
CO2 SERPL-SCNC: 21 MMOL/L (ref 22–29)
COLOR UR: ABNORMAL
CREAT SERPL-MCNC: 0.61 MG/DL (ref 0.76–1.27)
CREAT SERPL-MCNC: 0.75 MG/DL (ref 0.76–1.27)
DEPRECATED RDW RBC AUTO: 40.7 FL (ref 37–54)
EOSINOPHIL # BLD AUTO: 0 10*3/MM3 (ref 0–0.4)
EOSINOPHIL NFR BLD AUTO: 0 % (ref 0.3–6.2)
ERYTHROCYTE [DISTWIDTH] IN BLOOD BY AUTOMATED COUNT: 13.4 % (ref 12.3–15.4)
GFR SERPL CREATININE-BSD FRML MDRD: 127 ML/MIN/1.73
GFR SERPL CREATININE-BSD FRML MDRD: >150 ML/MIN/1.73
GLOBULIN UR ELPH-MCNC: 3.4 GM/DL
GLUCOSE SERPL-MCNC: 111 MG/DL (ref 65–99)
GLUCOSE SERPL-MCNC: 126 MG/DL (ref 65–99)
GLUCOSE UR STRIP-MCNC: NEGATIVE MG/DL
HCT VFR BLD AUTO: 35.6 % (ref 37.5–51)
HGB BLD-MCNC: 11.8 G/DL (ref 13–17.7)
HGB UR QL STRIP.AUTO: ABNORMAL
HYALINE CASTS UR QL AUTO: ABNORMAL /LPF
KETONES UR QL STRIP: ABNORMAL
LEUKOCYTE ESTERASE UR QL STRIP.AUTO: ABNORMAL
LYMPHOCYTES # BLD AUTO: 1.7 10*3/MM3 (ref 0.7–3.1)
LYMPHOCYTES NFR BLD AUTO: 9.7 % (ref 19.6–45.3)
MAGNESIUM SERPL-MCNC: 1.6 MG/DL (ref 1.6–2.6)
MCH RBC QN AUTO: 28.6 PG (ref 26.6–33)
MCHC RBC AUTO-ENTMCNC: 33.1 G/DL (ref 31.5–35.7)
MCV RBC AUTO: 86.3 FL (ref 79–97)
MONOCYTES # BLD AUTO: 0.7 10*3/MM3 (ref 0.1–0.9)
MONOCYTES NFR BLD AUTO: 4 % (ref 5–12)
NEUTROPHILS NFR BLD AUTO: 15.5 10*3/MM3 (ref 1.7–7)
NEUTROPHILS NFR BLD AUTO: 86.1 % (ref 42.7–76)
NITRITE UR QL STRIP: NEGATIVE
NRBC BLD AUTO-RTO: 0 /100 WBC (ref 0–0.2)
PH UR STRIP.AUTO: 6 [PH] (ref 5–8)
PHOSPHATE SERPL-MCNC: 3.5 MG/DL (ref 2.5–4.5)
PLATELET # BLD AUTO: 459 10*3/MM3 (ref 140–450)
PMV BLD AUTO: 6.6 FL (ref 6–12)
POTASSIUM SERPL-SCNC: 3.5 MMOL/L (ref 3.5–5.2)
POTASSIUM SERPL-SCNC: 3.8 MMOL/L (ref 3.5–5.2)
PROCALCITONIN SERPL-MCNC: 0.06 NG/ML (ref 0–0.25)
PROT SERPL-MCNC: 7.5 G/DL (ref 6–8.5)
PROT UR QL STRIP: ABNORMAL
RBC # BLD AUTO: 4.12 10*6/MM3 (ref 4.14–5.8)
RBC # UR: ABNORMAL /HPF
REF LAB TEST METHOD: ABNORMAL
SODIUM SERPL-SCNC: 140 MMOL/L (ref 136–145)
SODIUM SERPL-SCNC: 140 MMOL/L (ref 136–145)
SP GR UR STRIP: 1.02 (ref 1–1.03)
SQUAMOUS #/AREA URNS HPF: ABNORMAL /HPF
TSH SERPL DL<=0.05 MIU/L-ACNC: 0.38 UIU/ML (ref 0.27–4.2)
UROBILINOGEN UR QL STRIP: ABNORMAL
WBC # BLD AUTO: 18 10*3/MM3 (ref 3.4–10.8)
WBC UR QL AUTO: ABNORMAL /HPF
YEAST URNS QL MICRO: ABNORMAL /HPF

## 2020-08-05 PROCEDURE — G0378 HOSPITAL OBSERVATION PER HR: HCPCS

## 2020-08-05 PROCEDURE — 87040 BLOOD CULTURE FOR BACTERIA: CPT | Performed by: PHYSICIAN ASSISTANT

## 2020-08-05 PROCEDURE — 96367 TX/PROPH/DG ADDL SEQ IV INF: CPT

## 2020-08-05 PROCEDURE — 80048 BASIC METABOLIC PNL TOTAL CA: CPT | Performed by: PHYSICIAN ASSISTANT

## 2020-08-05 PROCEDURE — 85025 COMPLETE CBC W/AUTO DIFF WBC: CPT | Performed by: PHYSICIAN ASSISTANT

## 2020-08-05 PROCEDURE — 96365 THER/PROPH/DIAG IV INF INIT: CPT

## 2020-08-05 PROCEDURE — 25010000002 CEFTRIAXONE PER 250 MG: Performed by: PHYSICIAN ASSISTANT

## 2020-08-05 PROCEDURE — 96376 TX/PRO/DX INJ SAME DRUG ADON: CPT

## 2020-08-05 PROCEDURE — 83735 ASSAY OF MAGNESIUM: CPT | Performed by: PHYSICIAN ASSISTANT

## 2020-08-05 PROCEDURE — 96361 HYDRATE IV INFUSION ADD-ON: CPT

## 2020-08-05 PROCEDURE — 96375 TX/PRO/DX INJ NEW DRUG ADDON: CPT

## 2020-08-05 PROCEDURE — 25010000003 MAGNESIUM SULFATE 4 GM/100ML SOLUTION: Performed by: PHYSICIAN ASSISTANT

## 2020-08-05 PROCEDURE — 84100 ASSAY OF PHOSPHORUS: CPT | Performed by: PHYSICIAN ASSISTANT

## 2020-08-05 PROCEDURE — 99234 HOSP IP/OBS SM DT SF/LOW 45: CPT | Performed by: HOSPITALIST

## 2020-08-05 PROCEDURE — 25010000002 KETOROLAC TROMETHAMINE PER 15 MG: Performed by: PHYSICIAN ASSISTANT

## 2020-08-05 PROCEDURE — 96366 THER/PROPH/DIAG IV INF ADDON: CPT

## 2020-08-05 PROCEDURE — 25010000002 MORPHINE PER 10 MG: Performed by: PHYSICIAN ASSISTANT

## 2020-08-05 PROCEDURE — 84443 ASSAY THYROID STIM HORMONE: CPT | Performed by: PHYSICIAN ASSISTANT

## 2020-08-05 PROCEDURE — 81001 URINALYSIS AUTO W/SCOPE: CPT | Performed by: PHYSICIAN ASSISTANT

## 2020-08-05 RX ORDER — PROMETHAZINE HYDROCHLORIDE 25 MG/1
25 SUPPOSITORY RECTAL EVERY 6 HOURS PRN
Start: 2020-08-05 | End: 2021-02-23

## 2020-08-05 RX ORDER — POTASSIUM CHLORIDE 1.5 G/1.77G
40 POWDER, FOR SOLUTION ORAL AS NEEDED
Status: DISCONTINUED | OUTPATIENT
Start: 2020-08-05 | End: 2020-08-05 | Stop reason: HOSPADM

## 2020-08-05 RX ORDER — MORPHINE SULFATE 4 MG/ML
2 INJECTION, SOLUTION INTRAMUSCULAR; INTRAVENOUS ONCE
Status: COMPLETED | OUTPATIENT
Start: 2020-08-05 | End: 2020-08-05

## 2020-08-05 RX ORDER — LEVOFLOXACIN 750 MG/1
750 TABLET ORAL EVERY 24 HOURS
Status: DISCONTINUED | OUTPATIENT
Start: 2020-08-05 | End: 2020-08-05 | Stop reason: HOSPADM

## 2020-08-05 RX ORDER — POTASSIUM CHLORIDE 20 MEQ/1
40 TABLET, EXTENDED RELEASE ORAL AS NEEDED
Status: DISCONTINUED | OUTPATIENT
Start: 2020-08-05 | End: 2020-08-05 | Stop reason: HOSPADM

## 2020-08-05 RX ORDER — SPIRONOLACTONE 25 MG/1
50 TABLET ORAL DAILY
Status: DISCONTINUED | OUTPATIENT
Start: 2020-08-05 | End: 2020-08-05 | Stop reason: HOSPADM

## 2020-08-05 RX ORDER — SODIUM CHLORIDE 0.9 % (FLUSH) 0.9 %
10 SYRINGE (ML) INJECTION AS NEEDED
Status: DISCONTINUED | OUTPATIENT
Start: 2020-08-05 | End: 2020-08-05 | Stop reason: HOSPADM

## 2020-08-05 RX ORDER — ONDANSETRON 4 MG/1
4 TABLET, FILM COATED ORAL EVERY 6 HOURS PRN
Status: DISCONTINUED | OUTPATIENT
Start: 2020-08-05 | End: 2020-08-05 | Stop reason: HOSPADM

## 2020-08-05 RX ORDER — SODIUM CHLORIDE 0.9 % (FLUSH) 0.9 %
10 SYRINGE (ML) INJECTION EVERY 12 HOURS SCHEDULED
Status: DISCONTINUED | OUTPATIENT
Start: 2020-08-05 | End: 2020-08-05 | Stop reason: HOSPADM

## 2020-08-05 RX ORDER — MAGNESIUM SULFATE HEPTAHYDRATE 40 MG/ML
2 INJECTION, SOLUTION INTRAVENOUS AS NEEDED
Status: DISCONTINUED | OUTPATIENT
Start: 2020-08-05 | End: 2020-08-05 | Stop reason: HOSPADM

## 2020-08-05 RX ORDER — PANTOPRAZOLE SODIUM 20 MG/1
40 TABLET, DELAYED RELEASE ORAL EVERY MORNING
Status: DISCONTINUED | OUTPATIENT
Start: 2020-08-05 | End: 2020-08-05

## 2020-08-05 RX ORDER — CALCIUM GLUCONATE 20 MG/ML
2 INJECTION, SOLUTION INTRAVENOUS AS NEEDED
Status: DISCONTINUED | OUTPATIENT
Start: 2020-08-05 | End: 2020-08-05 | Stop reason: HOSPADM

## 2020-08-05 RX ORDER — SULFAMETHOXAZOLE AND TRIMETHOPRIM 800; 160 MG/1; MG/1
1 TABLET ORAL 2 TIMES DAILY
Start: 2020-08-05 | End: 2021-02-23

## 2020-08-05 RX ORDER — LEVOFLOXACIN 750 MG/1
750 TABLET ORAL ONCE
Status: COMPLETED | OUTPATIENT
Start: 2020-08-05 | End: 2020-08-05

## 2020-08-05 RX ORDER — HYDROCODONE BITARTRATE AND ACETAMINOPHEN 7.5; 325 MG/1; MG/1
1 TABLET ORAL EVERY 6 HOURS PRN
Start: 2020-08-05 | End: 2021-02-23

## 2020-08-05 RX ORDER — CALCIUM GLUCONATE 20 MG/ML
1 INJECTION, SOLUTION INTRAVENOUS AS NEEDED
Status: DISCONTINUED | OUTPATIENT
Start: 2020-08-05 | End: 2020-08-05 | Stop reason: HOSPADM

## 2020-08-05 RX ORDER — PANTOPRAZOLE SODIUM 40 MG/1
40 TABLET, DELAYED RELEASE ORAL
Status: DISCONTINUED | OUTPATIENT
Start: 2020-08-05 | End: 2020-08-05 | Stop reason: HOSPADM

## 2020-08-05 RX ORDER — PANTOPRAZOLE SODIUM 40 MG/1
40 TABLET, DELAYED RELEASE ORAL EVERY MORNING
Status: DISCONTINUED | OUTPATIENT
Start: 2020-08-05 | End: 2020-08-05

## 2020-08-05 RX ORDER — ACETAMINOPHEN 325 MG/1
650 TABLET ORAL EVERY 4 HOURS PRN
Status: DISCONTINUED | OUTPATIENT
Start: 2020-08-05 | End: 2020-08-05 | Stop reason: HOSPADM

## 2020-08-05 RX ORDER — DOCUSATE SODIUM 100 MG/1
100 CAPSULE, LIQUID FILLED ORAL 2 TIMES DAILY PRN
Status: DISCONTINUED | OUTPATIENT
Start: 2020-08-05 | End: 2020-08-05 | Stop reason: HOSPADM

## 2020-08-05 RX ORDER — ACETAMINOPHEN 160 MG/5ML
650 SOLUTION ORAL EVERY 4 HOURS PRN
Status: DISCONTINUED | OUTPATIENT
Start: 2020-08-05 | End: 2020-08-05 | Stop reason: HOSPADM

## 2020-08-05 RX ORDER — ACETAMINOPHEN 650 MG/1
650 SUPPOSITORY RECTAL EVERY 4 HOURS PRN
Status: DISCONTINUED | OUTPATIENT
Start: 2020-08-05 | End: 2020-08-05 | Stop reason: HOSPADM

## 2020-08-05 RX ORDER — ACETAZOLAMIDE 250 MG/1
500 TABLET ORAL DAILY
Status: DISCONTINUED | OUTPATIENT
Start: 2020-08-05 | End: 2020-08-05 | Stop reason: HOSPADM

## 2020-08-05 RX ORDER — NITROGLYCERIN 0.4 MG/1
0.4 TABLET SUBLINGUAL
Status: DISCONTINUED | OUTPATIENT
Start: 2020-08-05 | End: 2020-08-05 | Stop reason: HOSPADM

## 2020-08-05 RX ORDER — SODIUM CHLORIDE, SODIUM LACTATE, POTASSIUM CHLORIDE, CALCIUM CHLORIDE 600; 310; 30; 20 MG/100ML; MG/100ML; MG/100ML; MG/100ML
125 INJECTION, SOLUTION INTRAVENOUS CONTINUOUS
Status: DISCONTINUED | OUTPATIENT
Start: 2020-08-05 | End: 2020-08-05 | Stop reason: HOSPADM

## 2020-08-05 RX ORDER — MAGNESIUM SULFATE HEPTAHYDRATE 40 MG/ML
4 INJECTION, SOLUTION INTRAVENOUS AS NEEDED
Status: DISCONTINUED | OUTPATIENT
Start: 2020-08-05 | End: 2020-08-05 | Stop reason: HOSPADM

## 2020-08-05 RX ORDER — CHOLECALCIFEROL (VITAMIN D3) 125 MCG
5 CAPSULE ORAL NIGHTLY PRN
Status: DISCONTINUED | OUTPATIENT
Start: 2020-08-05 | End: 2020-08-05 | Stop reason: HOSPADM

## 2020-08-05 RX ORDER — ONDANSETRON 2 MG/ML
4 INJECTION INTRAMUSCULAR; INTRAVENOUS EVERY 6 HOURS PRN
Status: DISCONTINUED | OUTPATIENT
Start: 2020-08-05 | End: 2020-08-05 | Stop reason: HOSPADM

## 2020-08-05 RX ORDER — KETOROLAC TROMETHAMINE 15 MG/ML
15 INJECTION, SOLUTION INTRAMUSCULAR; INTRAVENOUS EVERY 6 HOURS PRN
Status: DISCONTINUED | OUTPATIENT
Start: 2020-08-05 | End: 2020-08-05 | Stop reason: HOSPADM

## 2020-08-05 RX ADMIN — Medication 10 ML: at 08:19

## 2020-08-05 RX ADMIN — SODIUM CHLORIDE, SODIUM LACTATE, POTASSIUM CHLORIDE, AND CALCIUM CHLORIDE 125 ML/HR: 600; 310; 30; 20 INJECTION, SOLUTION INTRAVENOUS at 01:45

## 2020-08-05 RX ADMIN — ACETAZOLAMIDE 500 MG: 250 TABLET ORAL at 08:19

## 2020-08-05 RX ADMIN — MAGNESIUM SULFATE HEPTAHYDRATE 4 G: 40 INJECTION, SOLUTION INTRAVENOUS at 06:24

## 2020-08-05 RX ADMIN — SPIRONOLACTONE 50 MG: 25 TABLET, FILM COATED ORAL at 08:19

## 2020-08-05 RX ADMIN — Medication 10 ML: at 01:45

## 2020-08-05 RX ADMIN — MORPHINE SULFATE 2 MG: 4 INJECTION INTRAVENOUS at 06:23

## 2020-08-05 RX ADMIN — SODIUM CHLORIDE 500 ML: 0.9 INJECTION, SOLUTION INTRAVENOUS at 00:00

## 2020-08-05 RX ADMIN — KETOROLAC TROMETHAMINE 15 MG: 15 INJECTION, SOLUTION INTRAMUSCULAR; INTRAVENOUS at 01:44

## 2020-08-05 RX ADMIN — KETOROLAC TROMETHAMINE 15 MG: 15 INJECTION, SOLUTION INTRAMUSCULAR; INTRAVENOUS at 08:19

## 2020-08-05 RX ADMIN — MORPHINE SULFATE 2 MG: 4 INJECTION INTRAVENOUS at 02:31

## 2020-08-05 RX ADMIN — CEFTRIAXONE SODIUM 1 G: 1 INJECTION, POWDER, FOR SOLUTION INTRAMUSCULAR; INTRAVENOUS at 01:52

## 2020-08-05 RX ADMIN — LEVOFLOXACIN 750 MG: 750 TABLET, FILM COATED ORAL at 01:45

## 2020-08-05 RX ADMIN — SERTRALINE HYDROCHLORIDE 50 MG: 50 TABLET, FILM COATED ORAL at 08:19

## 2020-08-05 NOTE — CONSULTS
Urology Consult Note    Patient:Amos Powell :1995  Room:[facility]/1  Admit Date2020  Age:25 y.o.     SEX:male     DOS:2020     MR:[id number]     Visit:[id number]       Attending: Marizol Griffith MD  Referring Provider: Dr Griffith  Reason for Consultation: Nausea    Patient Care Team:  Nabil Jason MD as PCP - General (Family Medicine)    Chief complaint nausea    Subjective .     History of present illness: Patient is a 25-year-old white male who status post cystoscopy right stent placement yesterday for a right ureteral stone.  We could not remove the stone secondary to his infection.  He is on antibiotics and pain medicine at home but could not keep anything down secondary to his nausea and vomiting.  He is feeling better this morning    Review of Systems  12 point review of systems were reviewed and are negative except for what is in HPI.    History  Past Medical History:   Diagnosis Date   • Constipation due to opioid therapy 2016   • Depression with anxiety 2020   • Gastroesophageal reflux disease without esophagitis 2020   • Gender dysphoria    • Hormonal imbalance in transgender patient 2020   • Kidney calculi    • Obesity, Class III, BMI 40-49.9 (morbid obesity) (CMS/[facility]) 2020     Past Surgical History:   Procedure Laterality Date   • CYSTOSCOPY BLADDER STONE LITHOTRIPSY     • WISDOM TOOTH EXTRACTION Bilateral     top and bottom     Social History     Socioeconomic History   • Marital status: Single     Spouse name: Not on file   • Number of children: Not on file   • Years of education: Not on file   • Highest education level: Not on file   Tobacco Use   • Smoking status: Never Smoker   • Smokeless tobacco: Never Used   Substance and Sexual Activity   • Alcohol use: Yes     Alcohol/week: 2.0 standard drinks     Types: 2 Cans of beer per week   • Drug use: Never   • Sexual activity: Yes     Partners: Female     Birth control/protection: Condom     Family History   Problem  Relation Age of Onset   • Hypertension Maternal Grandmother    • Diabetes type II Paternal Grandfather      No Known Allergies  Prior to Admission medications    Medication Sig Start Date End Date Taking? Authorizing Provider   acetaZOLAMIDE (DIAMOX) 500 MG capsule Take 500 mg by mouth Daily. 19  Yes Charo Harding MD   docusate sodium 100 MG capsule Take 200 mg by mouth 2 (Two) Times a Day. 20  Yes Lupe Condon APRN   estradiol valerate (DELESTROGEN) 40 MG/ML injection Inject 40 mg into the appropriate muscle as directed by prescriber 2 (Two) Times a Week.  and Wednesday 10/21/19  Yes Charo Harding MD   ondansetron (ZOFRAN) 4 MG tablet Take 1 tablet by mouth Every 6 (Six) Hours As Needed for Nausea or Vomiting. 20  Yes Lupe Condon APRN   pantoprazole (Protonix) 20 MG EC tablet Take 1 tablet by mouth Daily.  Patient taking differently: Take 20 mg by mouth Daily As Needed for Heartburn. 20  Yes Nabil Jason MD   progesterone (PROMETRIUM) 100 MG capsule Take 1 capsule by mouth Daily for 180 days. 20 Yes Nabil Jason MD   sertraline (ZOLOFT) 50 MG tablet Take 1 tablet by mouth Daily for 90 days. 20 Yes Nabil Jason MD   spironolactone (ALDACTONE) 50 MG tablet Take 1 tablet by mouth Daily for 180 days. 20 Yes Nabil Jason MD         Objective     tMax 24 hours:  Temp (24hrs), Av.9 °F (36.6 °C), Min:97 °F (36.1 °C), Max:98.9 °F (37.2 °C)    Vital Sign Ranges:  Temp:  [97 °F (36.1 °C)-98.9 °F (37.2 °C)] 98.2 °F (36.8 °C)  Heart Rate:  [] 113  Resp:  [14-21] 18  BP: (117-150)/(58-97) 129/77  Intake and Output Last 3 Shifts:  I/O last 3 completed shifts:  In: 612 [I.V.:612]  Out: 300 [Urine:300]      Physical Exam:     General Appearance:    Alert, cooperative, in no acute distress   Head:    Normocephalic, without obvious abnormality, atraumatic   Eyes:            Lids and lashes  normal, conjunctivae and sclerae normal, no   icterus, no pallor, corneas clear, PERRLA   Ears:    Ears appear intact with no abnormalities noted   Throat:   No oral lesions, no thrush, oral mucosa moist   Neck:   No adenopathy, supple, trachea midline, no thyromegaly, no   carotid bruit, no JVD   Back:     No kyphosis present, no scoliosis present, no skin lesions,      erythema or scars, no tenderness to percussion or                   palpation,   range of motion normal   Lungs:     Clear to auscultation,respirations regular, even and                  unlabored    Heart:    Regular rhythm and normal rate, normal S1 and S2, no            murmur, no gallop, no rub, no click   Chest Wall:    No abnormalities observed   Abdomen:     Normal bowel sounds, no masses, no organomegaly, soft        non-tender, non-distended, no guarding, no rebound                tenderness   Rectal:     Deferred   Extremities:   Moves all extremities well, no edema, no cyanosis, no             redness   Pulses:   Pulses palpable and equal bilaterally   Skin:   No bleeding, bruising or rash   Lymph nodes:   No palpable adenopathy   Neurologic:   Cranial nerves 2 - 12 grossly intact, sensation intact, DTR       present and equal bilaterally       Results Review:     Lab Results (last 24 hours)     Procedure Component Value Units Date/Time    TSH [[id number]]  (Normal) Collected:  08/05/20 0453    Specimen:  Blood Updated:  08/05/20 0541     TSH 0.379 uIU/mL     Basic Metabolic Panel [[id number]]  (Abnormal) Collected:  08/05/20 0453    Specimen:  Blood Updated:  08/05/20 0538     Glucose 111 mg/dL      BUN --     Comment: Testing performed by alternate method        Creatinine 0.61 mg/dL      Sodium 140 mmol/L      Potassium 3.5 mmol/L      Chloride 105 mmol/L      CO2 21.0 mmol/L      Calcium 8.6 mg/dL      eGFR Non African Amer >150 mL/min/1.73      BUN/Creatinine Ratio --     Comment: Testing not performed        Anion Gap 14.0 mmol/L      Narrative:       GFR Normal >60  Chronic Kidney Disease <60  Kidney Failure <15      Magnesium [[id number]]  (Normal) Collected:  08/05/20 0453    Specimen:  Blood Updated:  08/05/20 0538     Magnesium 1.6 mg/dL     Phosphorus [[id number]]  (Normal) Collected:  08/05/20 0453    Specimen:  Blood Updated:  08/05/20 0538     Phosphorus 3.5 mg/dL     BUN [[id number]] Collected:  08/05/20 0453    Specimen:  Blood Updated:  08/05/20 0538    CBC & Differential [[id number]] Collected:  08/05/20 0453    Specimen:  Blood Updated:  08/05/20 0513    Narrative:       The following orders were created for panel order CBC & Differential.  Procedure                               Abnormality         Status                     ---------                               -----------         ------                     CBC Auto Differential[[id number]]        Abnormal            Final result                 Please view results for these tests on the individual orders.    CBC Auto Differential [[id number]]  (Abnormal) Collected:  08/05/20 0453    Specimen:  Blood Updated:  08/05/20 0513     WBC 18.00 10*3/mm3      RBC 4.12 10*6/mm3      Hemoglobin 11.8 g/dL      Hematocrit 35.6 %      MCV 86.3 fL      MCH 28.6 pg      MCHC 33.1 g/dL      RDW 13.4 %      RDW-SD 40.7 fl      MPV 6.6 fL      Platelets 459 10*3/mm3      Neutrophil % 86.1 %      Lymphocyte % 9.7 %      Monocyte % 4.0 %      Eosinophil % 0.0 %      Basophil % 0.2 %      Neutrophils, Absolute 15.50 10*3/mm3      Lymphocytes, Absolute 1.70 10*3/mm3      Monocytes, Absolute 0.70 10*3/mm3      Eosinophils, Absolute 0.00 10*3/mm3      Basophils, Absolute 0.00 10*3/mm3      nRBC 0.0 /100 WBC     Procalcitonin [[id number]]  (Normal) Collected:  08/04/20 2343    Specimen:  Blood Updated:  08/05/20 0257     Procalcitonin 0.06 ng/mL     Narrative:       As a Marker for Sepsis (Non-Neonates):   1. <0.5 ng/mL represents a low risk of severe sepsis and/or septic shock.  1. >2 ng/mL represents a high  "risk of severe sepsis and/or septic shock.    As a Marker for Lower Respiratory Tract Infections that require antibiotic therapy:  PCT on Admission     Antibiotic Therapy             6-12 Hrs later  > 0.5                Strongly Recommended            >0.25 - <0.5         Recommended  0.1 - 0.25           Discouraged                   Remeasure/reassess PCT  <0.1                 Strongly Discouraged          Remeasure/reassess PCT      As 28 day mortality risk marker: \"Change in Procalcitonin Result\" (> 80 % or <=80 %) if Day 0 (or Day 1) and Day 4 values are available. Refer to http://[email or web address]-pct-calculator.com/   Change in PCT <=80 %   A decrease of PCT levels below or equal to 80 % defines a positive change in PCT test result representing a higher risk for 28-day all-cause mortality of patients diagnosed with severe sepsis or septic shock.  Change in PCT > 80 %   A decrease of PCT levels of more than 80 % defines a negative change in PCT result representing a lower risk for 28-day all-cause mortality of patients diagnosed with severe sepsis or septic shock.                Results may be falsely decreased if patient taking Biotin.     Blood Culture - Blood, Hand, Right [[id number]] Collected:  08/05/20 0137    Specimen:  Blood from Hand, Right Updated:  08/05/20 0142    Blood Culture - Blood, Arm, Left [[id number]] Collected:  08/05/20 0137    Specimen:  Blood from Arm, Left Updated:  08/05/20 0142    Urinalysis, Microscopic Only - Urine, Clean Catch [[id number]]  (Abnormal) Collected:  08/05/20 0050    Specimen:  Urine, Clean Catch Updated:  08/05/20 0105     RBC, UA Too Numerous to Count /HPF      WBC, UA 3-5 /HPF      Bacteria, UA None Seen /HPF      Squamous Epithelial Cells, UA 0-2 /HPF      Yeast, UA Small/1+ Yeast /HPF      Hyaline Casts, UA None Seen /LPF      Methodology Manual Light Microscopy    Urinalysis With Culture If Indicated - Urine, Clean Catch [[id number]]  (Abnormal) Collected:  08/05/20 0050 "    Specimen:  Urine, Clean Catch Updated:  08/05/20 0100     Color, UA Other     Comment: Result checked  Brown        Appearance, UA Turbid     pH, UA 6.0     Specific Gravity, UA 1.025     Glucose, UA Negative     Ketones, UA 40 mg/dL (2+)     Bilirubin, UA Moderate (2+)     Comment: Confirmation testing is unavailable.  A serum bilirubin is recommended for further assessment.        Blood, UA Large (3+)     Protein, UA >=300 mg/dL (3+)     Leuk Esterase, UA Moderate (2+)     Nitrite, UA Negative     Urobilinogen, UA 0.2 E.U./dL    BUN [[id number]]  (Normal) Collected:  08/04/20 2343    Specimen:  Blood Updated:  08/05/20 0018     BUN 10 mg/dL     Comprehensive Metabolic Panel [[id number]]  (Abnormal) Collected:  08/04/20 2343    Specimen:  Blood Updated:  08/05/20 0018     Glucose 126 mg/dL      BUN --     Comment: Testing performed by alternate method        Creatinine 0.75 mg/dL      Sodium 140 mmol/L      Potassium 3.8 mmol/L      Comment: Specimen hemolyzed.  Results may be affected.        Chloride 103 mmol/L      CO2 19.0 mmol/L      Calcium 9.1 mg/dL      Total Protein 7.5 g/dL      Albumin 4.10 g/dL      ALT (SGPT) 41 U/L      AST (SGOT) 34 U/L      Comment: Specimen hemolyzed.  Results may be affected.        Alkaline Phosphatase 56 U/L      Total Bilirubin 0.4 mg/dL      eGFR Non African Amer 127 mL/min/1.73      Globulin 3.4 gm/dL      A/G Ratio 1.2 g/dL      BUN/Creatinine Ratio --     Comment: Testing not performed        Anion Gap 18.0 mmol/L     Narrative:       GFR Normal >60  Chronic Kidney Disease <60  Kidney Failure <15      CBC & Differential [[id number]] Collected:  08/04/20 2343    Specimen:  Blood Updated:  08/04/20 2353    Narrative:       The following orders were created for panel order CBC & Differential.  Procedure                               Abnormality         Status                     ---------                               -----------         ------                     CBC Auto  Differential[[id number]]        Abnormal            Final result                 Please view results for these tests on the individual orders.    CBC Auto Differential [[id number]]  (Abnormal) Collected:  08/04/20 2343    Specimen:  Blood Updated:  08/04/20 2353     WBC 19.40 10*3/mm3      RBC 4.64 10*6/mm3      Hemoglobin 13.2 g/dL      Hematocrit 39.8 %      MCV 85.7 fL      MCH 28.4 pg      MCHC 33.1 g/dL      RDW 13.7 %      RDW-SD 41.1 fl      MPV 6.6 fL      Platelets 493 10*3/mm3      Neutrophil % 90.0 %      Lymphocyte % 7.0 %      Monocyte % 2.7 %      Eosinophil % 0.0 %      Basophil % 0.3 %      Neutrophils, Absolute 17.50 10*3/mm3      Lymphocytes, Absolute 1.40 10*3/mm3      Monocytes, Absolute 0.50 10*3/mm3      Eosinophils, Absolute 0.00 10*3/mm3      Basophils, Absolute 0.10 10*3/mm3      nRBC 0.0 /100 WBC          No results found for: URINECX     Imaging Results (Last 7 Days)     ** No results found for the last 168 hours. **          Inpatient Meds:   Scheduled Meds:  acetaZOLAMIDE 500 mg Oral Daily   cefTRIAXone 1 g Intravenous Q24H   levoFLOXacin 750 mg Oral Q24H   pantoprazole 40 mg Oral QAM   sertraline 50 mg Oral Daily   sodium chloride 10 mL Intravenous Q12H   spironolactone 50 mg Oral Daily      Continuous Infusions:  lactated ringers 125 mL/hr Last Rate: 125 mL/hr (08/05/20 0546)      PRN Meds:.•  acetaminophen **OR** acetaminophen **OR** acetaminophen  •  Calcium Gluconate-NaCl **AND** calcium gluconate **AND** Calcium, Ionized  •  docusate sodium  •  ketamine (KETALAR) IVPB **AND** Ketamine Vital Signs & Assessment  •  ketorolac  •  magnesium sulfate **OR** magnesium sulfate **OR** magnesium sulfate  •  melatonin  •  nitroglycerin  •  ondansetron **OR** ondansetron  •  potassium & sodium phosphates **OR** potassium & sodium phosphates  •  potassium chloride  •  potassium chloride  •  [COMPLETED] Insert peripheral IV **AND** sodium chloride  •  sodium chloride      Assessment/Plan     Right  ureteral calculi    Nausea and vomiting    Status post cystoscopy right stent placement yesterday    I have written him a prescription for Phenergan suppositories to go home with today and I will see him next week as planned to take care of his stone as an outpatient    I discussed the patients findings and my recommendations with patient.    Delano Hester MD  08/05/20  07:10

## 2020-08-05 NOTE — DISCHARGE SUMMARY
[facility] Medicine Services  DISCHARGE SUMMARY        Prepared For PCP:  Nabil Jason MD    Patient Name: Amos Powell  : 1995  MRN: [id number]      Date of Admission:   2020    Date of Discharge:  2020    Length of stay:  LOS: 0 days     Hospital Course     Presenting Problem:   Right flank pain [R10.9]  Right ureteral stone [N20.1]  Leukocytosis, unspecified type [D72.829]      Active Hospital Problems    Diagnosis  POA   • Right flank pain [R10.9]  Yes   • Hydronephrosis with ureteropelvic junction (UPJ) obstruction [Q62.11]  Yes   • Obesity, Class III, BMI 40-49.9 (morbid obesity) (CMS/[facility]) [E66.01]  Yes   • Gastroesophageal reflux disease without esophagitis [K21.9]  Yes   • Hormonal imbalance in transgender patient [E34.9, F64.0]  Yes   • Depression with anxiety [F41.8]  Yes   • Constipation due to opioid therapy [K59.03, T40.2X5A]  Yes   • Right ureteral stone [N20.1]  Yes      Resolved Hospital Problems   No resolved problems to display.     Assessment and plan:  Right flank pain secondary to infected obstructing ureteral stone status post cystoscopy and stent placement.      Sepsis is felt to have been ruled out.    Nausea and vomiting secondary to above, improved.  -Phenergan suppositories prescribed at discharge.    Hospital Course:  Amos Powell is a 25 y.o. male who identifies as a female who was admitted for nausea and vomiting related to right flank pain with nausea and vomiting and was found to have an obstructing right ureteral stone.  Initial urinalysis did not reflex to culture.  The patient was taken to the operating room where a right ureteral obstructing stone was identified with significant debris in the urine above it that was sent for culture from the operating room per Dr. Hester.  The patient was discharged on Cipro and Norco.  Unfortunately he developed significant nausea and vomiting and could not keep down his medications.  He was given  antiemetics and IV fluid hydration with resolution of his symptoms he is now felt to to be stable for discharge and is anxious to go home.  He will be given a prescription for Phenergan suppositories from Dr. Hester.  Condition on discharge is stable.        Day of Discharge     HPI: Patient reports no current complaints.      Vital Signs:   Temp:  [97.7 °F (36.5 °C)-98.9 °F (37.2 °C)] 97.7 °F (36.5 °C)  Heart Rate:  [] 106  Resp:  [16-20] 16  BP: (109-150)/(67-97) 109/67     Physical Exam:  Physical Exam well-developed well-nourished in no acute distress sitting up in bed awake and alert; mucous membranes moist; sclerae anicteric; lungs clear to auscultation bilaterally; CV regular rate and rhythm; abdomen soft nontender nondistended with active bowel sounds; extremities with no edema, cyanosis or calf tenderness; palpable pedal pulses bilaterally; no Chang catheter.    Pertinent  and/or Most Recent Results     Results from last 7 days   Lab Units 08/05/20 0453 08/04/20 2343 08/04/20  0730   WBC 10*3/mm3 18.00* 19.40* 12.90*   HEMOGLOBIN g/dL 11.8* 13.2 13.1   HEMATOCRIT % 35.6* 39.8 39.5   PLATELETS 10*3/mm3 459* 493* 482*   SODIUM mmol/L 140 140 140   POTASSIUM mmol/L 3.5 3.8 3.5   CHLORIDE mmol/L 105 103 105   CO2 mmol/L 21.0* 19.0* 20.0*   BUN  11 10 10   CREATININE mg/dL 0.61* 0.75* 0.70*   GLUCOSE mg/dL 111* 126* 115*   CALCIUM mg/dL 8.6 9.1 9.2     Results from last 7 days   Lab Units 08/04/20 2343 08/04/20  0730   BILIRUBIN mg/dL 0.4 0.3   ALK PHOS U/L 56 62   ALT (SGPT) U/L 41 46*   AST (SGOT) U/L 34 34           Invalid input(s): TG, LDLCALC, LDLREALC  Results from last 7 days   Lab Units 08/05/20 0453 08/04/20  2343   TSH uIU/mL 0.379  --    PROCALCITONIN ng/mL  --  0.06       Brief Urine Lab Results  (Last result in the past 365 days)      Color   Clarity   Blood   Leuk Est   Nitrite   Protein   CREAT   Urine HCG        08/05/20 0050 Other  Comment:  Result checked  Brown Turbid Large (3+)  Moderate (2+) Negative >=300 mg/dL (3+)               Microbiology Results Abnormal     None          Ct Abdomen Pelvis Without Contrast    Result Date: 8/4/2020  Impression:  1. 8 mm obstructing stone at the right ureteropelvic junction with moderate hydronephrosis. 2. Diffuse hepatic steatosis. 3. Haziness in the mesentery which is a nonspecific finding and appears unchanged from the patient's previous scan of 6/6/2020.  Electronically Signed By-Brant Vasquez On:8/4/2020 8:17 AM This report was finalized on [id number] by  Brant Vasquez, .                             Test Results Pending at Discharge   Order Current Status    Blood Culture - Blood, Arm, Left In process    Blood Culture - Blood, Hand, Right In process            Procedures Performed           Consults:   Consults     Date and Time Order Name Status Description    8/5/2020 0010 IP Consult to Urology Completed     8/5/2020 0008 Hospitalist (on-call MD unless specified) Completed     8/4/2020 0842 Hospitalist (on-call MD unless specified) Completed     8/4/2020 0824 Urology (on-call MD unless specified) Completed             Discharge Details        Discharge Medications      New Medications      Instructions Start Date   HYDROcodone-acetaminophen 7.5-325 MG per tablet  Commonly known as:  Norco   1 tablet, Oral, Every 6 Hours PRN      promethazine 25 MG suppository  Commonly known as:  PHENERGAN   25 mg, Rectal, Every 6 Hours PRN      sulfamethoxazole-trimethoprim 800-160 MG per tablet  Commonly known as:  Bactrim DS   160 mg, Oral, 2 Times Daily         Changes to Medications      Instructions Start Date   pantoprazole 20 MG EC tablet  Commonly known as:  Protonix  What changed:    · when to take this  · reasons to take this   20 mg, Oral, Daily         Continue These Medications      Instructions Start Date   acetaZOLAMIDE 500 MG capsule  Commonly known as:  DIAMOX   500 mg, Oral, Daily      docusate sodium 100 MG capsule   200 mg, Oral, 2 Times  Daily      estradiol valerate 40 MG/ML injection  Commonly known as:  DELESTROGEN   40 mg, Intramuscular, 2 Times Weekly, Sunday and Wednesday      ondansetron 4 MG tablet  Commonly known as:  ZOFRAN   4 mg, Oral, Every 6 Hours PRN      progesterone 100 MG capsule  Commonly known as:  PROMETRIUM   100 mg, Oral, Daily      sertraline 50 MG tablet  Commonly known as:  ZOLOFT   50 mg, Oral, Daily      spironolactone 50 MG tablet  Commonly known as:  ALDACTONE   50 mg, Oral, Daily             No Known Allergies      Discharge Disposition:  Home or Self Care    Diet:  Hospital:  Diet Order   Procedures   • Diet Regular         Discharge Activity:   Activity Instructions     Activity as Tolerated              CODE STATUS:    Code Status and Medical Interventions:   Ordered at: 08/05/20 0036     Code Status:    CPR     Medical Interventions (Level of Support Prior to Arrest):    Full         Follow-up Appointments  No future appointments.    Additional Instructions for the Follow-ups that You Need to Schedule     Call MD With Problems / Concerns   As directed      Instructions: Call [phone] or email [email or web address] for problems or concerns.    Order Comments:  Instructions: Call [phone] or email [email or web address] for problems or concerns.          Discharge Follow-up with PCP   As directed       Currently Documented PCP:    Nabil Jason MD    PCP Phone Number:    [phone]     Follow Up Details:  2 to 3 business days                 Condition on Discharge:      Stable      This patient has been examined wearing appropriate Personal Protective Equipment. 08/05/20      Electronically signed by Marizol Griffith MD, 08/05/20, 2:12 PM.      Time: I spent 25 minutes on this discharge activity which included face-to-face encounter with the patient/reviewing the data in the system/coordination of the care with the nursing staff as well as consultants/documentation/entering orders.

## 2020-08-05 NOTE — ED NOTES
Pt reports right sided lower abdominal pain and right sided testicle pain.  Pt states he was seen in this ED earlier today and diagnosed with kidney stone.     Sania Funes RN  08/04/20 4117

## 2020-08-05 NOTE — PLAN OF CARE
Problem: Patient Care Overview  Goal: Plan of Care Review  Outcome: Ongoing (interventions implemented as appropriate)  Flowsheets (Taken 8/5/2020 5265)  Progress: no change  Plan of Care Reviewed With: patient  Outcome Summary: Patient pain improved. Patient remains tachycardiac. Awaiting consultation with Dr Hester today. Will monitor.

## 2020-08-05 NOTE — PLAN OF CARE
Problem: Patient Care Overview  Goal: Plan of Care Review  Outcome: Ongoing (interventions implemented as appropriate)  Flowsheets  Taken 8/5/2020 1548  Progress: no change  Outcome Summary: discharge pending  Taken 8/5/2020 0200  Plan of Care Reviewed With: patient

## 2020-08-05 NOTE — OUTREACH NOTE
Call Center TCM Note      Responses   [facility] [place], operated by [facility] patient discharged from?  [facility]   Does the patient have one of the following disease processes/diagnoses(primary or secondary)?  General Surgery   TCM attempt successful?  No   Change in Health Status  Readmitted          Eleonora Hutchinson RN    8/5/2020, 06:34

## 2020-08-05 NOTE — ED PROVIDER NOTES
Subjective   History:  Patient is a 25-year-old trans-female presents to the ER with persistent right-sided abdominal and flank pain.  She was discharged in the hospital day after receiving a stent for a kidney stone.  She reports been unable to keep medications down and she has been having intractable nausea vomiting since her discharge.    Onset: 1 day  Location: right sided flank and abdominal  Duration: constant  Character: achy pain and N/V  Aggravating/Alleviating factors: none  Radiation None  Severity: moderate            Review of Systems   Constitutional: Negative for chills, diaphoresis, fatigue and fever.   Respiratory: Negative for cough, choking and shortness of breath.    Cardiovascular: Negative for chest pain.   Gastrointestinal: Positive for abdominal pain, nausea and vomiting.   Genitourinary: Negative.    Musculoskeletal: Negative.    Skin: Negative.    Neurological: Negative.    Psychiatric/Behavioral: Negative.        Past Medical History:   Diagnosis Date   • Constipation due to opioid therapy 6/7/2016   • Depression with anxiety 1/8/2020   • Gastroesophageal reflux disease without esophagitis 5/21/2020   • Gender dysphoria    • Hormonal imbalance in transgender patient 1/8/2020   • Kidney calculi    • Obesity, Class III, BMI 40-49.9 (morbid obesity) (CMS/HCC) 8/4/2020       No Known Allergies    Past Surgical History:   Procedure Laterality Date   • CYSTOSCOPY BLADDER STONE LITHOTRIPSY     • WISDOM TOOTH EXTRACTION Bilateral     top and bottom       Family History   Problem Relation Age of Onset   • Hypertension Maternal Grandmother    • Diabetes type II Paternal Grandfather        Social History     Socioeconomic History   • Marital status: Single     Spouse name: Not on file   • Number of children: Not on file   • Years of education: Not on file   • Highest education level: Not on file   Tobacco Use   • Smoking status: Never Smoker   • Smokeless tobacco: Never Used   Substance and Sexual  Activity   • Alcohol use: Yes     Alcohol/week: 2.0 standard drinks     Types: 2 Cans of beer per week   • Drug use: Never   • Sexual activity: Yes     Partners: Female     Birth control/protection: Condom           Objective   Physical Exam   Constitutional: He is oriented to person, place, and time. He appears well-developed and well-nourished.   HENT:   Head: Normocephalic and atraumatic.   Eyes: Pupils are equal, round, and reactive to light.   Neck: Normal range of motion.   Pulmonary/Chest: Effort normal.   Abdominal: Soft. There is tenderness in the right lower quadrant.   Musculoskeletal: Normal range of motion.   Neurological: He is alert and oriented to person, place, and time.   Skin: Skin is warm and dry.   Psychiatric: He has a normal mood and affect. His behavior is normal.       Procedures           ED Course          Ct Abdomen Pelvis Without Contrast    Result Date: 8/4/2020   1. 8 mm obstructing stone at the right ureteropelvic junction with moderate hydronephrosis. 2. Diffuse hepatic steatosis. 3. Haziness in the mesentery which is a nonspecific finding and appears unchanged from the patient's previous scan of 6/6/2020.  Electronically Signed By-Brant Vasquez On:8/4/2020 8:17 AM This report was finalized on [id number] by  Brant Vasquez, .    Labs Reviewed   CBC WITH AUTO DIFFERENTIAL - Abnormal; Notable for the following components:       Result Value    WBC 19.40 (*)     Platelets 493 (*)     Neutrophil % 90.0 (*)     Lymphocyte % 7.0 (*)     Monocyte % 2.7 (*)     Eosinophil % 0.0 (*)     Neutrophils, Absolute 17.50 (*)     All other components within normal limits   BLOOD CULTURE   BLOOD CULTURE   COMPREHENSIVE METABOLIC PANEL   URINALYSIS W/ CULTURE IF INDICATED   POC LACTATE   CBC AND DIFFERENTIAL    Narrative:     The following orders were created for panel order CBC & Differential.  Procedure                               Abnormality         Status                     ---------                                -----------         ------                     CBC Auto Differential[[id number]]        Abnormal            Final result                 Please view results for these tests on the individual orders.     Medications   sodium chloride 0.9 % flush 10 mL (has no administration in time range)   sodium chloride 0.9 % bolus 500 mL (has no administration in time range)   cefTRIAXone (ROCEPHIN) in SWFI 2 GRAMS/20ml IV PUSH syringe (has no administration in time range)   levoFLOXacin (LEVAQUIN) tablet 750 mg (has no administration in time range)   ondansetron (ZOFRAN) injection 4 mg (4 mg Intravenous Given 8/4/20 0057)   Morphine sulfate (PF) injection 2 mg (2 mg Intravenous Given 8/4/20 4239)                                       MDM  Number of Diagnoses or Management Options  Leukocytosis, unspecified type:   Right flank pain:   Right ureteral stone:   Diagnosis management comments: I examined the patient using the appropriate personal protective equipment.      DISPOSITION:   Chart Review:  Comorbidity:  has a past medical history of Constipation due to opioid therapy (6/7/2016), Depression with anxiety (1/8/2020), Gastroesophageal reflux disease without esophagitis (5/21/2020), Gender dysphoria, Hormonal imbalance in transgender patient (1/8/2020), Kidney calculi, and Obesity, Class III, BMI 40-49.9 (morbid obesity) (CMS/HCC) (8/4/2020).  Differentials:this list is not all inclusive and does not constitute the entirety of considered causes --> right ureter stone, sepsis, UTI  Labs: WBC 19.4    Imaging: Was interpreted by physician and reviewed by myself:  Ct Abdomen Pelvis Without Contrast    Result Date: 8/4/2020   1. 8 mm obstructing stone at the right ureteropelvic junction with moderate hydronephrosis. 2. Diffuse hepatic steatosis. 3. Haziness in the mesentery which is a nonspecific finding and appears unchanged from the patient's previous scan of 6/6/2020.  Electronically Signed By-Brant Vasquez  On:8/4/2020 8:17 AM This report was finalized on [id number] by  Brant Vasquez, .      Disposition/Treatment:  Patient is a 25-year-old trans-female presents to the ER intractable right-sided flank pain and nausea vomiting.  Significant increasing WBC from when she was discharged earlier.  She was started on Rocephin and IV fluids given pain medication antinausea medication.  Sepsis protocol was initiated admitted to the hospital also stable in agreement with plan.  Urology inpatient consult order.  Deferred imaging to the hospitalist.         Amount and/or Complexity of Data Reviewed  Decide to obtain previous medical records or to obtain history from someone other than the patient: yes    Patient Progress  Patient progress: stable      Final diagnoses:   Right flank pain   Right ureteral stone   Leukocytosis, unspecified type            Zoey Fatima, MARCO ANTONIO  08/05/20 0014

## 2020-08-05 NOTE — PROGRESS NOTES
Discharge Planning Assessment  [facility]     Patient Name: Amos Powell  MRN: [id number]  Today's Date: 8/5/2020    Admit Date: 8/4/2020    Discharge Needs Assessment     Row Name 08/05/20 1101       Living Environment    Lives With  significant other    Name(s) of Who Lives With Patient  lives with girlfriend    Current Living Arrangements  home/apartment/condo    Primary Care Provided by  self    Provides Primary Care For  no one    Family Caregiver if Needed  significant other    Quality of Family Relationships  helpful    Able to Return to Prior Arrangements  yes       Resource/Environmental Concerns    Resource/Environmental Concerns  none    Transportation Concerns  car, none       Transition Planning    Patient/Family Anticipates Transition to  home    Patient/Family Anticipated Services at Transition  none    Transportation Anticipated  family or friend will provide       Discharge Needs Assessment    Readmission Within the Last 30 Days  other (see comments) current and previous admit in observation status, re-admitted due to nausea/vomiting     Concerns to be Addressed  denies needs/concerns at this time    Equipment Currently Used at Home  none    Anticipated Changes Related to Illness  none    Equipment Needed After Discharge  none    Provided Post Acute Provider List?  N/A        Discharge Plan     Row Name 08/05/20 1102       Plan    Plan  Anticipate routine home     Patient/Family in Agreement with Plan  yes    Plan Comments  Met with patient at bedside at a distance greater than 6 feet with a mask and goggles. Reports lives at home with girlfriend, I with ADLs, still drives, no DME. PCP confirmed. Reports no issues affording food or medications. Currently denies any d/c needs or concerns.        Expected Discharge Date and Time     Expected Discharge Date Expected Discharge Time    Aug 5, 2020         Demographic Summary     Row Name 08/05/20 1101       General Information    Admission Type  observation     Arrived From  emergency department    Referral Source  admission list    Reason for Consult  discharge planning    Preferred Language  English     Used During This Interaction  no        Functional Status     Row Name 08/05/20 1101       Functional Status    Usual Activity Tolerance  good    Current Activity Tolerance  good       Functional Status, IADL    Medications  independent    Meal Preparation  independent    Housekeeping  independent    Laundry  independent    Shopping  independent       Mental Status    General Appearance WDL  WDL              Gretel Pate RN

## 2020-08-05 NOTE — H&P
[facility]      Patient Name: Amos Powell  : 1995  MRN: [id number]  Primary Care Physician: Nabil Jason MD  Date of admission: 2020    Patient Care Team:  Nabil Jason MD as PCP - General (Family Medicine)          Subjective   History Present Illness     Chief Complaint:   Chief Complaint   Patient presents with   • Testicle Pain       Mr. Powell is a 25 y.o. male transitioning to female who presents to [facility] complaining of right flank pain.  Patient was discharged from this facility on 2020 following cystoscopy with stent placement for nephrolithiasis with planned outpatient lithotripsy.  Urology noted that there is significant amount of debris which came out with the guidewire which was suspicious for a UTI at that time and patient was started on Bactrim.  Since that time patient reports worsening abdominal and flank pain as well as profound nausea and vomiting which has kept her from tolerating any p.o. intake.    In the ED patient found to have lab significant for anion gap: 18.0 with a CO2 of 19.0, WBCs: 19.40, platelets: 493.  Remainder of CBC and CMP generally unremarkable.      History of Present Illness    Review of Systems   Constitution: Negative.   HENT: Negative.    Eyes: Negative.    Cardiovascular: Negative.    Respiratory: Negative.    Skin: Negative.    Gastrointestinal: Positive for nausea and vomiting. Negative for constipation, diarrhea and hematemesis.   Genitourinary: Positive for flank pain.   Neurological: Negative.            Personal History     Past Medical History:   Past Medical History:   Diagnosis Date   • Constipation due to opioid therapy 2016   • Depression with anxiety 2020   • Gastroesophageal reflux disease without esophagitis 2020   • Gender dysphoria    • Hormonal imbalance in transgender patient 2020   • Kidney calculi    • Obesity, Class III, BMI 40-49.9 (morbid  obesity) (CMS/[facility]) 8/4/2020       Surgical History:      Past Surgical History:   Procedure Laterality Date   • CYSTOSCOPY BLADDER STONE LITHOTRIPSY     • WISDOM TOOTH EXTRACTION Bilateral     top and bottom           Family History: family history includes Diabetes type II in his paternal grandfather; Hypertension in his maternal grandmother. Otherwise pertinent FHx was reviewed and unremarkable.     Social History:  reports that he has never smoked. He has never used smokeless tobacco. He reports that he drinks about 2.0 standard drinks of alcohol per week. He reports that he does not use drugs.      Medications:  Prior to Admission medications    Medication Sig Start Date End Date Taking? Authorizing Provider   acetaZOLAMIDE (DIAMOX) 500 MG capsule Take 500 mg by mouth Daily. 11/19/19   Charo Harding MD   docusate sodium 100 MG capsule Take 200 mg by mouth 2 (Two) Times a Day. 8/4/20   Lupe Condon APRN   estradiol valerate (DELESTROGEN) 40 MG/ML injection Inject 40 mg into the appropriate muscle as directed by prescriber 2 (Two) Times a Week. Guillermo 10/21/19   Charo Harding MD   ondansetron (ZOFRAN) 4 MG tablet Take 1 tablet by mouth Every 6 (Six) Hours As Needed for Nausea or Vomiting. 8/4/20   Lupe Condon APRN   pantoprazole (Protonix) 20 MG EC tablet Take 1 tablet by mouth Daily. 5/21/20   Nabil Jason MD   progesterone (PROMETRIUM) 100 MG capsule Take 1 capsule by mouth Daily for 180 days. 5/21/20 11/17/20  Nabil Jason MD   sertraline (ZOLOFT) 50 MG tablet Take 1 tablet by mouth Daily for 90 days. 5/21/20 8/19/20  Nabil Jason MD   spironolactone (ALDACTONE) 50 MG tablet Take 1 tablet by mouth Daily for 180 days. 5/21/20 11/17/20  Nabil Jason MD       Allergies:  No Known Allergies    Objective   Objective     Vital Signs  Temp:  [97 °F (36.1 °C)-98.4 °F (36.9 °C)] 98 °F (36.7 °C)  Heart Rate:  [] 105  Resp:  [14-21] 18  BP:  (117-150)/() 150/88  SpO2:  [93 %-98 %] 98 %  on  Flow (L/min):  [6] 6;   Device (Oxygen Therapy): room air  Body mass index is 46.91 kg/m².    Physical Exam   Constitutional: He is oriented to person, place, and time. He appears well-developed and well-nourished. No distress.   HENT:   Head: Normocephalic and atraumatic.   Right Ear: External ear normal.   Left Ear: External ear normal.   Nose: Nose normal.   Eyes: Conjunctivae and EOM are normal.   Neck: Normal range of motion. No JVD present.   Cardiovascular: Normal rate, regular rhythm, normal heart sounds and intact distal pulses.   Pulmonary/Chest: Effort normal and breath sounds normal.   Abdominal: Soft. Bowel sounds are normal.   Musculoskeletal: Normal range of motion.   No CVA tenderness   Neurological: He is alert and oriented to person, place, and time.   Skin: Skin is warm and dry.   Psychiatric: He has a normal mood and affect. His behavior is normal. Judgment and thought content normal.   Vitals reviewed.      Results Review:  I have personally reviewed most recent lab results and radiology images and interpretations and agree with findings, most notably: CBC, CMP and recent CT scan.    Results from last 7 days   Lab Units 08/04/20  2343   WBC 10*3/mm3 19.40*   HEMOGLOBIN g/dL 13.2   HEMATOCRIT % 39.8   PLATELETS 10*3/mm3 493*     Results from last 7 days   Lab Units 08/04/20  2343   SODIUM mmol/L 140   POTASSIUM mmol/L 3.8   CHLORIDE mmol/L 103   CO2 mmol/L 19.0*   BUN  10   CREATININE mg/dL 0.75*   GLUCOSE mg/dL 126*   CALCIUM mg/dL 9.1   ALT (SGPT) U/L 41   AST (SGOT) U/L 34     Estimated Creatinine Clearance: 213 mL/min (A) (by C-G formula based on SCr of 0.75 mg/dL (L)).  Brief Urine Lab Results  (Last result in the past 365 days)      Color   Clarity   Blood   Leuk Est   Nitrite   Protein   CREAT   Urine HCG        08/04/20 0740 Yellow Clear Large (3+) Negative Negative Negative               Microbiology Results (last 10 days)     ** No  results found for the last 240 hours. **          ECG/EMG Results (most recent)     None                    Ct Abdomen Pelvis Without Contrast    Result Date: 8/4/2020   1. 8 mm obstructing stone at the right ureteropelvic junction with moderate hydronephrosis. 2. Diffuse hepatic steatosis. 3. Haziness in the mesentery which is a nonspecific finding and appears unchanged from the patient's previous scan of 6/6/2020.  Electronically Signed By-Brant Vasquez On:8/4/2020 8:17 AM This report was finalized on [id number] by  Brant Vasquez, .        Estimated Creatinine Clearance: 213 mL/min (A) (by C-G formula based on SCr of 0.75 mg/dL (L)).    Assessment/Plan   Assessment/Plan       Active Hospital Problems    Diagnosis  POA   • **Sepsis (CMS/HCC) [A41.9]  Yes     Priority: High   • Right flank pain [R10.9]  Yes   • Hydronephrosis with ureteropelvic junction (UPJ) obstruction [Q62.11]  Yes   • Obesity, Class III, BMI 40-49.9 (morbid obesity) (CMS/HCC) [E66.01]  Yes   • Gastroesophageal reflux disease without esophagitis [K21.9]  Yes   • Hormonal imbalance in transgender patient [E34.9, F64.0]  Yes   • Depression with anxiety [F41.8]  Yes   • Constipation due to opioid therapy [K59.03, T40.2X5A]  Yes      Resolved Hospital Problems   No resolved problems to display.     Sepsis likely secondary to urinary tract infection  -Patient triggered sepsis while in the ED with right flank pain, leukocytosis and tachycardia  -WBCs: 19.40  with increased absolute neutrophil count noted on differential and neutrophil to lymphocyte ration of: 13.86  -Lactic acid: Pending  -Temperature: 98.0, Heart rate: 105, Blood pressure: 150/88,  -Blood cultures obtained and are pending  -Rocephin and Levaquin started in the ED started in the ED, continue  -Tylenol PRN   -Zofran for nausea    Right flank pain with recent diagnosis of kidney stone causing hydronephrosis  -Patient had stent placed by urology on 8/4/2020 with provider noting debris with  discharge at that time possibly secondary to UTI   -Antibiotics as above  -LR at 125 mL's per hour  -Monitor I's and O's  -Urology consulted in ED    GERD  -Continue PPI    Hormonal imbalance in transgender patient  -Continue Diamox, progesterone, spironolactone    Constipation due to opioid therapy  -Colace twice daily as needed    Depression/anxiety  -Continue Zoloft    Morbid obesity (BMI: 46.91)  -Encouraged on lifestyle modifications            VTE Prophylaxis -SCDs  Mechanical Order History:      Ordered        Signed and Held  Place Sequential Compression Device  Once         Signed and Held  Maintain Sequential Compression Device  Continuous                 Pharmalogical Order History:     None          CODE STATUS: Full  Code Status and Medical Interventions:   Ordered at: 08/05/20 0036     Code Status:    CPR     Medical Interventions (Level of Support Prior to Arrest):    Full         I discussed the patient's findings and my recommendations with patient and nursing staff.        Electronically signed by Juaquin Macias PA-C, 08/05/20, 12:42 AM.  [facility] Hospitalist Team

## 2020-08-05 NOTE — OUTREACH NOTE
Prep Survey      Responses   [facility] patient discharged from?  [facility]   Is LACE score < 7 ?  Yes   Eligibility  [facility]   Date of Admission  08/04/20   Date of Discharge  08/05/20   Discharge Disposition  Home or Self Care   Discharge diagnosis  Sepsis likely secondary to urinary tract infection,    Right flank pain with recent diagnosis of kidney stone causing hydronephrosis   COVID-19 Test Status  Not tested   Does the patient have one of the following disease processes/diagnoses(primary or secondary)?  Sepsis   Does the patient have Home health ordered?  No   Is there a DME ordered?  No   Prep survey completed?  Yes          Maureen Magdaleno RN

## 2020-08-06 ENCOUNTER — TRANSITIONAL CARE MANAGEMENT TELEPHONE ENCOUNTER (OUTPATIENT)
Dept: CALL CENTER | Facility: HOSPITAL | Age: 25
End: 2020-08-06

## 2020-08-06 NOTE — OUTREACH NOTE
Call Center TCM Note      Responses   [facility] patient discharged from?  [facility]   COVID-19 Test Status  Not tested   Does the patient have one of the following disease processes/diagnoses(primary or secondary)?  Sepsis   TCM attempt successful?  No   Unsuccessful attempts  Attempt 2          Zaheer Torres RN    8/6/2020, 14:48

## 2020-08-06 NOTE — OUTREACH NOTE
Call Center TCM Note      Responses   [facility] patient discharged from?  [facility]   COVID-19 Test Status  Not tested   Does the patient have one of the following disease processes/diagnoses(primary or secondary)?  Sepsis   TCM attempt successful?  No   Unsuccessful attempts  Attempt 1   Call Status  Left message          Zaheer Torres RN    8/6/2020, 14:09

## 2020-08-07 ENCOUNTER — TRANSITIONAL CARE MANAGEMENT TELEPHONE ENCOUNTER (OUTPATIENT)
Dept: CALL CENTER | Facility: HOSPITAL | Age: 25
End: 2020-08-07

## 2020-08-07 NOTE — OUTREACH NOTE
Call Center TCM Note      Responses   [facility] patient discharged from?  [facility]   COVID-19 Test Status  Not tested   Does the patient have one of the following disease processes/diagnoses(primary or secondary)?  Sepsis   TCM attempt successful?  Yes   Call start time  1346   Call end time  1351   General alerts for this patient  Patient is transgendering from male to female prefers to go by [place]-    Discharge diagnosis  Sepsis likely secondary to urinary tract infection,    Right flank pain with recent diagnosis of kidney stone causing hydronephrosis   Meds reviewed with patient/caregiver?  Yes   Is the patient having any side effects they believe may be caused by any medication additions or changes?  No   Does the patient have all medications related to this admission filled (includes all antibiotics, inhalers, nebulizers,steroids,etc.)  Yes   Is the patient taking all medications as directed (includes completed medication regime)?  Yes   Does the patient have a primary care provider?   Yes   Comments regarding PCP  PCP:  Nabil Jason MD followup scheduled on 8/17/2020   Does the patient have an appointment with their PCP within 7 days of discharge?  Greater than 7 days   What is preventing the patient from scheduling follow up appointments within 7 days of discharge?  Earlier appointment not available   Nursing Interventions  Verified appointment date/time/provider   Has the patient kept scheduled appointments due by today?  N/A   Has home health visited the patient within 72 hours of discharge?  N/A   Psychosocial issues?  No   Did the patient receive a copy of their discharge instructions?  Yes   Nursing interventions  Reviewed instructions with patient   What is the patient's perception of their health status since discharge?  Improving   Nursing interventions  Nurse provided patient education   Is the patient/caregiver able to teach back Sepsis?  S - Shivering,fever or very cold, E -  Extreme pain or generalized discomfort (worst ever,especially abdomen), P - Pale or discolored skin, S - Sleepy, difficult to arouse,confused, I -   I feel like I might die-a feeling of hopelessness, S - Short of breath   Nursing interventions  Nurse provided reassurance to patient   Is patient/caregiver able to teach back steps to recovery at home?  Rest and regain strength, Make a list of questions for PCP appoinment   Is the patient/caregiver able to teach back signs and symptoms of worsening condition:  Fever, Hyperthermia, Edema   Is the patient/caregiver able to teach back the hierarchy of who to call/visit for symptoms/problems? PCP, Specialist, Home health nurse, Urgent Care, ED, 911  Yes   TCM call completed?  Yes          Zaheer Torres RN    8/7/2020, 13:51

## 2020-08-10 LAB
BACTERIA SPEC AEROBE CULT: NORMAL
BACTERIA SPEC AEROBE CULT: NORMAL

## 2020-08-12 ENCOUNTER — TRANSCRIBE ORDERS (OUTPATIENT)
Dept: ADMINISTRATIVE | Facility: HOSPITAL | Age: 25
End: 2020-08-12

## 2020-08-12 ENCOUNTER — HOSPITAL ENCOUNTER (OUTPATIENT)
Dept: CARDIOLOGY | Facility: HOSPITAL | Age: 25
Discharge: HOME | End: 2020-08-12
Admitting: ANESTHESIOLOGY

## 2020-08-12 DIAGNOSIS — N20.1 CALCULUS OF URETER: Primary | ICD-10-CM

## 2020-08-12 PROCEDURE — 93005 ELECTROCARDIOGRAM TRACING: CPT

## 2020-08-15 PROCEDURE — 93010 ELECTROCARDIOGRAM REPORT: CPT | Performed by: INTERNAL MEDICINE

## 2020-11-16 RX ORDER — ESTRADIOL VALERATE 40 MG/ML
4 INJECTION INTRAMUSCULAR 2 TIMES WEEKLY
Qty: 5 ML | Refills: 5 | Status: SHIPPED | OUTPATIENT
Start: 2020-11-16 | End: 2021-02-23

## 2020-11-16 NOTE — TELEPHONE ENCOUNTER
Caller: Amos Powell    Relationship: Self    Best call back number: [phone]     Medication needed:   Requested Prescriptions     Pending Prescriptions Disp Refills   • estradiol valerate (DELESTROGEN) 40 MG/ML injection        Sig: Inject 1 mL into the appropriate muscle as directed by prescriber 2 (Two) Times a Week. Sunday and Wednesday       When do you need the refill by: 11/16/20    Does the patient have less than a 3 day supply:  [x] Yes  [] No    What is the patient's preferred pharmacy: [id number] [place] [address] [place] [address] AT [facility] [phone] [facility] [phone]

## 2021-01-20 DIAGNOSIS — IMO0001 HORMONAL IMBALANCE IN TRANSGENDER PATIENT: ICD-10-CM

## 2021-02-23 ENCOUNTER — OFFICE VISIT (OUTPATIENT)
Dept: FAMILY MEDICINE CLINIC | Facility: CLINIC | Age: 26
End: 2021-02-23

## 2021-02-23 VITALS
RESPIRATION RATE: 18 BRPM | HEIGHT: 69 IN | HEART RATE: 108 BPM | SYSTOLIC BLOOD PRESSURE: 130 MMHG | BODY MASS INDEX: 46.65 KG/M2 | WEIGHT: 315 LBS | DIASTOLIC BLOOD PRESSURE: 72 MMHG | OXYGEN SATURATION: 99 % | TEMPERATURE: 97.6 F

## 2021-02-23 DIAGNOSIS — H60.393 OTHER INFECTIVE ACUTE OTITIS EXTERNA OF BOTH EARS: ICD-10-CM

## 2021-02-23 DIAGNOSIS — Z51.81 THERAPEUTIC DRUG MONITORING: ICD-10-CM

## 2021-02-23 DIAGNOSIS — IMO0001 HORMONAL IMBALANCE IN TRANSGENDER PATIENT: Primary | ICD-10-CM

## 2021-02-23 DIAGNOSIS — F41.8 DEPRESSION WITH ANXIETY: Chronic | ICD-10-CM

## 2021-02-23 PROCEDURE — 99214 OFFICE O/P EST MOD 30 MIN: CPT | Performed by: FAMILY MEDICINE

## 2021-02-23 RX ORDER — NEOMYCIN/POLYMYXIN B/HYDROCORT 3.5-10K-1
1 SUSPENSION, DROPS(FINAL DOSAGE FORM)(ML) OPHTHALMIC (EYE)
COMMUNITY
End: 2021-02-23

## 2021-02-23 RX ORDER — CIPROFLOXACIN AND DEXAMETHASONE 3; 1 MG/ML; MG/ML
4 SUSPENSION/ DROPS AURICULAR (OTIC) 2 TIMES DAILY
Qty: 7.5 ML | Refills: 0 | Status: SHIPPED | OUTPATIENT
Start: 2021-02-23 | End: 2021-11-06 | Stop reason: HOSPADM

## 2021-02-23 RX ORDER — ESTRADIOL VALERATE 20 MG/ML
4 INJECTION INTRAMUSCULAR 2 TIMES WEEKLY
Qty: 5 ML | Refills: 3 | Status: SHIPPED | OUTPATIENT
Start: 2021-02-25 | End: 2021-09-08 | Stop reason: SDUPTHER

## 2021-02-23 NOTE — PROGRESS NOTES
"Chief Complaint  Earache (bilateral ears right is worse)    Subjective    History of Present Illness {CC  Problem List  Visit  Diagnosis   Encounters  Notes  Medications  Labs  Result Review Imaging  Media :23}     Amos Powell presents to [facility] for Earache (bilateral ears right is worse).  History of Present Illness     Here today with complaint of an earache ongoing for several weeks.  Also due for follow-up on gender affirming hormone therapy.    Ear pain started with some general tenderness on the right.  Is difficult to sleep on the ear because of pain.  Has noticed some slight discharge from that site as well.  Left side has similar symptoms though has never been quite as severe.  Seen by urgent care recently, started on some antibiotic and steroid eardrops.  Does feel like they are helping a little bit though is not sure that the drops are actually getting all the way into her ear.  Girlfriend is helping her with administration.    Happy with current state of her transition.  Continues on estradiol and progesterone as prescribed.  Has been trying rectal dosing of her progesterone with some improved chest growth.  Needs refill of both estradiol and progesterone today.    Also needs a refill of sertraline.  Ran out a while ago, has found that mood is significantly worse off of the medication.  Would like to get back on it.  Stressors are relatively stable.  Overall manageable but occasionally overwhelm her.    Objective     Vital Signs:   /72   Pulse 108   Temp 97.6 °F (36.4 °C)   Resp 18   Ht 175.3 cm (69\")   Wt (!) 150 kg (330 lb)   SpO2 99%   BMI 48.73 kg/m²   Physical Exam  Vitals signs and nursing note reviewed.   Constitutional:       General: He is not in acute distress.     Appearance: Normal appearance. He is not ill-appearing.   HENT:      Right Ear: Drainage, swelling and tenderness present. Tympanic membrane is not injected.      Left Ear: " Drainage, swelling and tenderness present. Tympanic membrane is not injected.   Cardiovascular:      Rate and Rhythm: Normal rate and regular rhythm.      Pulses: Normal pulses.      Heart sounds: Normal heart sounds. No murmur.   Pulmonary:      Effort: Pulmonary effort is normal. No respiratory distress.      Breath sounds: Normal breath sounds. No rales.   Neurological:      Mental Status: He is alert and oriented to person, place, and time. Mental status is at baseline.   Psychiatric:         Mood and Affect: Mood normal.         Behavior: Behavior normal.          Result Review  Data Reviewed:{ Labs  Result Review  Imaging  Med Tab  Media :23}                   Assessment and Plan {CC Problem List  Visit Diagnosis  ROS  Review (Popup)  Health Maintenance  Quality  BestPractice  Medications  SmartSets  SnapShot Encounters  Media :23}   Diagnoses and all orders for this visit:    1. Hormonal imbalance in transgender patient (Primary)  -     Estradiol  -     Estrone  -     Testosterone  -     Lipid Panel With LDL / HDL Ratio  -     Comprehensive Metabolic Panel  -     progesterone (PROMETRIUM) 100 MG capsule; Take 1 capsule by mouth Daily.  Dispense: 90 capsule; Refill: 3  -     estradiol valerate (DELESTROGEN) 20 MG/ML injection; Inject 0.2 mL into the appropriate muscle as directed by prescriber 2 (Two) Times a Week.  Dispense: 5 mL; Refill: 3    2. Therapeutic drug monitoring  -     Estradiol  -     Estrone  -     Testosterone  -     Lipid Panel With LDL / HDL Ratio  -     Comprehensive Metabolic Panel    3. Other infective acute otitis externa of both ears  -     ciprofloxacin-dexamethasone (Ciprodex) 0.3-0.1 % otic suspension; Administer 4 drops into both ears 2 (Two) Times a Day.  Dispense: 7.5 mL; Refill: 0    4. Depression with anxiety  -     sertraline (ZOLOFT) 50 MG tablet; Take 1 tablet by mouth Daily for 180 days.  Dispense: 90 tablet; Refill: 1    Orders as above.  I will contact her  with lab results as available and will discuss any necessary changes to her regimen that time.    We will change up her eardrops slightly for easier use.  She can continue with what she has and then start these as it may take longer than anticipated to clear up her otitis.    Restarted sertraline as above.  Discussed self titration as needed.    Recommended closer follow-up in the short-term.  Encouraged communication via [employer] in the meantime.      Follow Up {Instructions Charge Capture  Follow-up Communications :23}     Patient was given instructions and counseling regarding his condition or for health maintenance advice. Please see specific information pulled into the AVS (placed there by myself) if appropriate.    Return in about 3 months (around 5/23/2021), or if symptoms worsen or fail to improve.      XIMENA Jason MD

## 2021-02-24 LAB
ALBUMIN SERPL-MCNC: 4.1 G/DL (ref 3.5–5.2)
ALBUMIN/GLOB SERPL: 1.4 G/DL
ALP SERPL-CCNC: 76 U/L (ref 39–117)
ALT SERPL-CCNC: 22 U/L (ref 1–41)
AST SERPL-CCNC: 29 U/L (ref 1–40)
BILIRUB SERPL-MCNC: 0.3 MG/DL (ref 0–1.2)
BUN SERPL-MCNC: 9 MG/DL (ref 6–20)
BUN/CREAT SERPL: 19.6 (ref 7–25)
CALCIUM SERPL-MCNC: 9.3 MG/DL (ref 8.6–10.5)
CHLORIDE SERPL-SCNC: 107 MMOL/L (ref 98–107)
CHOLEST SERPL-MCNC: 136 MG/DL (ref 0–200)
CO2 SERPL-SCNC: 23.3 MMOL/L (ref 22–29)
CREAT SERPL-MCNC: 0.46 MG/DL (ref 0.76–1.27)
ESTRADIOL SERPL-MCNC: 408 PG/ML (ref 7.6–42.6)
ESTRONE SERPL-MCNC: 318 PG/ML (ref 15–65)
GLOBULIN SER CALC-MCNC: 2.9 GM/DL
GLUCOSE SERPL-MCNC: 99 MG/DL (ref 65–99)
HDLC SERPL-MCNC: 42 MG/DL (ref 40–60)
LDLC SERPL CALC-MCNC: 57 MG/DL (ref 0–100)
LDLC/HDLC SERPL: 1.14 {RATIO}
POTASSIUM SERPL-SCNC: 4.4 MMOL/L (ref 3.5–5.2)
PROT SERPL-MCNC: 7 G/DL (ref 6–8.5)
SODIUM SERPL-SCNC: 140 MMOL/L (ref 136–145)
TESTOST SERPL-MCNC: <3 NG/DL (ref 264–916)
TRIGL SERPL-MCNC: 230 MG/DL (ref 0–150)
VLDLC SERPL CALC-MCNC: 37 MG/DL (ref 5–40)

## 2021-03-20 ENCOUNTER — APPOINTMENT (OUTPATIENT)
Dept: CT IMAGING | Facility: HOSPITAL | Age: 26
End: 2021-03-20

## 2021-03-20 ENCOUNTER — HOSPITAL ENCOUNTER (EMERGENCY)
Facility: HOSPITAL | Age: 26
Discharge: HOME | End: 2021-03-20
Attending: EMERGENCY MEDICINE | Admitting: EMERGENCY MEDICINE

## 2021-03-20 VITALS
HEIGHT: 69 IN | DIASTOLIC BLOOD PRESSURE: 80 MMHG | SYSTOLIC BLOOD PRESSURE: 123 MMHG | HEART RATE: 98 BPM | OXYGEN SATURATION: 99 % | WEIGHT: 315 LBS | TEMPERATURE: 98.1 F | BODY MASS INDEX: 46.65 KG/M2 | RESPIRATION RATE: 16 BRPM

## 2021-03-20 DIAGNOSIS — N20.1 LEFT URETERAL STONE: Primary | ICD-10-CM

## 2021-03-20 LAB
ANION GAP SERPL CALCULATED.3IONS-SCNC: 12 MMOL/L (ref 5–15)
BACTERIA UR QL AUTO: ABNORMAL /HPF
BASOPHILS # BLD AUTO: 0 10*3/MM3 (ref 0–0.2)
BASOPHILS NFR BLD AUTO: 0.3 % (ref 0–1.5)
BILIRUB UR QL STRIP: NEGATIVE
BUN SERPL-MCNC: 9 MG/DL (ref 6–20)
BUN/CREAT SERPL: 14.1 (ref 7–25)
CALCIUM SPEC-SCNC: 8.8 MG/DL (ref 8.6–10.5)
CHLORIDE SERPL-SCNC: 105 MMOL/L (ref 98–107)
CLARITY UR: ABNORMAL
CO2 SERPL-SCNC: 22 MMOL/L (ref 22–29)
COLOR UR: YELLOW
CREAT SERPL-MCNC: 0.64 MG/DL (ref 0.76–1.27)
DEPRECATED RDW RBC AUTO: 42 FL (ref 37–54)
EOSINOPHIL # BLD AUTO: 0.2 10*3/MM3 (ref 0–0.4)
EOSINOPHIL NFR BLD AUTO: 2.3 % (ref 0.3–6.2)
ERYTHROCYTE [DISTWIDTH] IN BLOOD BY AUTOMATED COUNT: 14.1 % (ref 12.3–15.4)
GFR SERPL CREATININE-BSD FRML MDRD: >150 ML/MIN/1.73
GLUCOSE SERPL-MCNC: 88 MG/DL (ref 65–99)
GLUCOSE UR STRIP-MCNC: NEGATIVE MG/DL
HCT VFR BLD AUTO: 39 % (ref 37.5–51)
HGB BLD-MCNC: 13.1 G/DL (ref 13–17.7)
HGB UR QL STRIP.AUTO: ABNORMAL
HYALINE CASTS UR QL AUTO: ABNORMAL /LPF
KETONES UR QL STRIP: NEGATIVE
LEUKOCYTE ESTERASE UR QL STRIP.AUTO: NEGATIVE
LYMPHOCYTES # BLD AUTO: 3 10*3/MM3 (ref 0.7–3.1)
LYMPHOCYTES NFR BLD AUTO: 31.4 % (ref 19.6–45.3)
MCH RBC QN AUTO: 28.5 PG (ref 26.6–33)
MCHC RBC AUTO-ENTMCNC: 33.5 G/DL (ref 31.5–35.7)
MCV RBC AUTO: 85 FL (ref 79–97)
MONOCYTES # BLD AUTO: 1 10*3/MM3 (ref 0.1–0.9)
MONOCYTES NFR BLD AUTO: 10.6 % (ref 5–12)
NEUTROPHILS NFR BLD AUTO: 5.4 10*3/MM3 (ref 1.7–7)
NEUTROPHILS NFR BLD AUTO: 55.4 % (ref 42.7–76)
NITRITE UR QL STRIP: NEGATIVE
NRBC BLD AUTO-RTO: 0.1 /100 WBC (ref 0–0.2)
PH UR STRIP.AUTO: 7 [PH] (ref 5–8)
PLATELET # BLD AUTO: 454 10*3/MM3 (ref 140–450)
PMV BLD AUTO: 6.9 FL (ref 6–12)
POTASSIUM SERPL-SCNC: 4 MMOL/L (ref 3.5–5.2)
PROT UR QL STRIP: ABNORMAL
RBC # BLD AUTO: 4.58 10*6/MM3 (ref 4.14–5.8)
RBC # UR: ABNORMAL /HPF
REF LAB TEST METHOD: ABNORMAL
SODIUM SERPL-SCNC: 139 MMOL/L (ref 136–145)
SP GR UR STRIP: 1.02 (ref 1–1.03)
SQUAMOUS #/AREA URNS HPF: ABNORMAL /HPF
UROBILINOGEN UR QL STRIP: ABNORMAL
WBC # BLD AUTO: 9.7 10*3/MM3 (ref 3.4–10.8)
WBC UR QL AUTO: ABNORMAL /HPF

## 2021-03-20 PROCEDURE — 80048 BASIC METABOLIC PNL TOTAL CA: CPT | Performed by: EMERGENCY MEDICINE

## 2021-03-20 PROCEDURE — 25010000002 KETOROLAC TROMETHAMINE PER 15 MG: Performed by: EMERGENCY MEDICINE

## 2021-03-20 PROCEDURE — 96374 THER/PROPH/DIAG INJ IV PUSH: CPT

## 2021-03-20 PROCEDURE — 81001 URINALYSIS AUTO W/SCOPE: CPT | Performed by: EMERGENCY MEDICINE

## 2021-03-20 PROCEDURE — 99283 EMERGENCY DEPT VISIT LOW MDM: CPT

## 2021-03-20 PROCEDURE — 85025 COMPLETE CBC W/AUTO DIFF WBC: CPT | Performed by: EMERGENCY MEDICINE

## 2021-03-20 PROCEDURE — 74176 CT ABD & PELVIS W/O CONTRAST: CPT

## 2021-03-20 RX ORDER — HYDROCODONE BITARTRATE AND ACETAMINOPHEN 7.5; 325 MG/1; MG/1
1-2 TABLET ORAL EVERY 6 HOURS PRN
Qty: 20 TABLET | Refills: 0 | Status: SHIPPED | OUTPATIENT
Start: 2021-03-20 | End: 2021-11-10 | Stop reason: HOSPADM

## 2021-03-20 RX ORDER — TAMSULOSIN HYDROCHLORIDE 0.4 MG/1
1 CAPSULE ORAL DAILY
Qty: 30 CAPSULE | Refills: 0 | Status: SHIPPED | OUTPATIENT
Start: 2021-03-20 | End: 2022-12-29

## 2021-03-20 RX ORDER — ONDANSETRON 4 MG/1
4 TABLET, FILM COATED ORAL EVERY 8 HOURS PRN
Qty: 20 TABLET | Refills: 0 | Status: SHIPPED | OUTPATIENT
Start: 2021-03-20 | End: 2022-03-20 | Stop reason: HOSPADM

## 2021-03-20 RX ORDER — KETOROLAC TROMETHAMINE 30 MG/ML
30 INJECTION, SOLUTION INTRAMUSCULAR; INTRAVENOUS ONCE
Status: COMPLETED | OUTPATIENT
Start: 2021-03-20 | End: 2021-03-20

## 2021-03-20 RX ADMIN — SODIUM CHLORIDE 1000 ML: 9 INJECTION, SOLUTION INTRAVENOUS at 02:44

## 2021-03-20 RX ADMIN — KETOROLAC TROMETHAMINE 30 MG: 30 INJECTION, SOLUTION INTRAMUSCULAR at 02:46

## 2021-03-20 NOTE — ED PROVIDER NOTES
Subjective   25-year-old male complains of moderate left flank pain onset 30 minutes prior to arrival associate with nausea radiating to his left groin.  Pain feels consistent with prior episodes of kidney stones.  Other associated symptoms.          Review of Systems   Gastrointestinal: Positive for abdominal pain and nausea.   Genitourinary: Positive for flank pain.   All other systems reviewed and are negative.      Past Medical History:   Diagnosis Date   • Constipation due to opioid therapy 6/7/2016   • Depression with anxiety 1/8/2020   • Gastroesophageal reflux disease without esophagitis 5/21/2020   • Gender dysphoria    • Hormonal imbalance in transgender patient 1/8/2020   • Kidney calculi    • Obesity, Class III, BMI 40-49.9 (morbid obesity) (CMS/[facility]) 8/4/2020       No Known Allergies    Past Surgical History:   Procedure Laterality Date   • CYSTOSCOPY BLADDER STONE LITHOTRIPSY     • CYSTOSCOPY, URETEROSCOPY, RETROGRADE PYELOGRAM, STENT INSERTION Right 8/4/2020    Procedure: CYSTOSCOPY WITH RIGHT URETERAL STENT PLACEMENT;  Surgeon: Delano Hester MD;  Location: [facility] OR;  Service: Urology;  Laterality: Right;   • WISDOM TOOTH EXTRACTION Bilateral     top and bottom       Family History   Problem Relation Age of Onset   • Hypertension Maternal Grandmother    • Diabetes type II Paternal Grandfather        Social History     Socioeconomic History   • Marital status: Single     Spouse name: Not on file   • Number of children: Not on file   • Years of education: Not on file   • Highest education level: Not on file   Tobacco Use   • Smoking status: Never Smoker   • Smokeless tobacco: Never Used   Substance and Sexual Activity   • Alcohol use: Yes     Alcohol/week: 2.0 standard drinks     Types: 2 Cans of beer per week   • Drug use: Never   • Sexual activity: Yes     Partners: Female     Birth control/protection: Condom           Objective   Physical Exam  Constitutional:       General: He is in acute  distress.      Appearance: He is obese.   HENT:      Head: Normocephalic and atraumatic.      Mouth/Throat:      Mouth: Mucous membranes are moist.      Pharynx: Oropharynx is clear.   Eyes:      Conjunctiva/sclera: Conjunctivae normal.      Pupils: Pupils are equal, round, and reactive to light.   Cardiovascular:      Rate and Rhythm: Normal rate and regular rhythm.   Pulmonary:      Effort: Pulmonary effort is normal.      Breath sounds: Normal breath sounds.   Abdominal:      General: Bowel sounds are normal. There is no distension.      Palpations: Abdomen is soft.      Tenderness: There is no abdominal tenderness.   Musculoskeletal:         General: No swelling or tenderness. Normal range of motion.      Cervical back: Normal range of motion and neck supple.   Skin:     General: Skin is warm and dry.      Capillary Refill: Capillary refill takes less than 2 seconds.   Neurological:      General: No focal deficit present.      Mental Status: He is alert and oriented to person, place, and time.   Psychiatric:         Mood and Affect: Mood normal.         Behavior: Behavior normal.         Procedures           ED Course                                           MDM  Number of Diagnoses or Management Options  Left ureteral stone  Diagnosis management comments: Results for orders placed or performed during the hospital encounter of 03/20/21  -Basic Metabolic Panel  Specimen: Blood       Result                      Value             Ref Range           Glucose                     88                65 - 99 mg/dL       BUN                         9                 6 - 20 mg/dL        Creatinine                  0.64 (L)          0.76 - 1.27 *       Sodium                      139               136 - 145 mm*       Potassium                   4.0               3.5 - 5.2 mm*       Chloride                    105               98 - 107 mmo*       CO2                         22.0              22.0 - 29.0 *       Calcium                      8.8               8.6 - 10.5 m*       eGFR Non African Amer       >150              >60 mL/min/1*       BUN/Creatinine Ratio        14.1              7.0 - 25.0          Anion Gap                   12.0              5.0 - 15.0 m*  -Urinalysis With Culture If Indicated - Urine, Clean Catch  Specimen: Urine, Clean Catch       Result                      Value             Ref Range           Color, UA                   Yellow            Yellow, Straw       Appearance, UA              Hazy (A)          Clear               pH, UA                      7.0               5.0 - 8.0           Specific [place], UA        1.019             1.005 - 1.030       Glucose, UA                 Negative          Negative            Ketones, UA                 Negative          Negative            Bilirubin, UA               Negative          Negative            Blood, UA                                     Negative        Large (3+) (A)       Protein, UA                                   Negative        30 mg/dL (1+) (A)       Leuk Esterase, UA           Negative          Negative            Nitrite, UA                 Negative          Negative            Urobilinogen, UA            0.2 E.U./dL       0.2 - 1.0 E.*  -CBC Auto Differential  Specimen: Blood       Result                      Value             Ref Range           WBC                         9.70              3.40 - 10.80*       RBC                         4.58              4.14 - 5.80 *       Hemoglobin                  13.1              13.0 - 17.7 *       Hematocrit                  39.0              37.5 - 51.0 %       MCV                         85.0              79.0 - 97.0 *       MCH                         28.5              26.6 - 33.0 *       MCHC                        33.5              31.5 - 35.7 *       RDW                         14.1              12.3 - 15.4 %       RDW-SD                      42.0              37.0 - 54.0 *       MPV                          6.9               6.0 - 12.0 fL       Platelets                   454 (H)           140 - 450 10*       Neutrophil %                55.4              42.7 - 76.0 %       Lymphocyte %                31.4              19.6 - 45.3 %       Monocyte %                  10.6              5.0 - 12.0 %        Eosinophil %                2.3               0.3 - 6.2 %         Basophil %                  0.3               0.0 - 1.5 %         Neutrophils, Absolute       5.40              1.70 - 7.00 *       Lymphocytes, Absolute       3.00              0.70 - 3.10 *       Monocytes, Absolute         1.00 (H)          0.10 - 0.90 *       Eosinophils, Absolute       0.20              0.00 - 0.40 *       Basophils, Absolute         0.00              0.00 - 0.20 *       nRBC                        0.1               0.0 - 0.2 /1*  -Urinalysis, Microscopic Only - Urine, Clean Catch  Specimen: Urine, Clean Catch       Result                      Value             Ref Range           RBC, UA                                       None Seen /H*   Too Numerous to Count (A)       WBC, UA                     3-5 (A)           None Seen /H*       Bacteria, UA                None Seen         None Seen /H*       Squamous Epithelial Ce*     7-12 (A)          None Seen, 0*       Hyaline Casts, UA           3-6               None Seen /L*       Methodology                                                   Automated Microscopy    Patient well, pain controlled, inspect reviewed.       Amount and/or Complexity of Data Reviewed  Clinical lab tests: ordered and reviewed  Tests in the radiology section of CPT®: ordered and reviewed  Tests in the medicine section of CPT®: ordered and reviewed        Final diagnoses:   Left ureteral stone       ED Disposition  ED Disposition     ED Disposition Condition Comment    Discharge Stable           [facility] - [place] [id number] [facility] [address]  [phone]  Schedule an  appointment as soon as possible for a visit            Medication List      New Prescriptions    HYDROcodone-acetaminophen 7.5-325 MG per tablet  Commonly known as: NORCO  Take 1-2 tablets by mouth Every 6 (Six) Hours As Needed for Moderate Pain .     ondansetron 4 MG tablet  Commonly known as: ZOFRAN  Take 1 tablet by mouth Every 8 (Eight) Hours As Needed for Nausea or Vomiting.     tamsulosin 0.4 MG capsule 24 hr capsule  Commonly known as: FLOMAX  Take 1 capsule by mouth Daily.           Where to Get Your Medications      [id number] - [place], [place] - [address] AT [facility] - [phone] [facility] [phone]   [address], [place] [address]    Phone: [phone]   · HYDROcodone-acetaminophen 7.5-325 MG per tablet  · ondansetron 4 MG tablet  · tamsulosin 0.4 MG capsule 24 hr capsule          Brant Nesbitt MD  03/20/21 3046

## 2021-09-08 DIAGNOSIS — IMO0001 HORMONAL IMBALANCE IN TRANSGENDER PATIENT: ICD-10-CM

## 2021-09-08 RX ORDER — ESTRADIOL VALERATE 20 MG/ML
4 INJECTION INTRAMUSCULAR 2 TIMES WEEKLY
Qty: 5 ML | Refills: 3 | Status: SHIPPED | OUTPATIENT
Start: 2021-09-09 | End: 2022-04-11 | Stop reason: SDUPTHER

## 2021-09-08 NOTE — TELEPHONE ENCOUNTER
Caller: Amos Powell    Relationship: Self    Best call back number: [phone]    Medication needed:    estradiol valerate (DELESTROGEN) 20 MG/ML injection           When do you need the refill by: 09/08/21    What additional details did the patient provide when requesting the medication: PATIENT STATES HE DROPPED THE BOTTLE AND THE LID BROKE OFF, AND THE MEDICATION ALL SPILLED OUT.    Does the patient have less than a 3 day supply:  [x] Yes  [] No    What is the patient's preferred pharmacy:    [facility] #[id number] [address] AT [facility] - [phone] [place] [phone] [phone]      PLEASE ADVISE.

## 2021-11-04 ENCOUNTER — HOSPITAL ENCOUNTER (EMERGENCY)
Facility: HOSPITAL | Age: 26
Discharge: HOME | End: 2021-11-05
Admitting: EMERGENCY MEDICINE

## 2021-11-04 ENCOUNTER — APPOINTMENT (OUTPATIENT)
Dept: CT IMAGING | Facility: HOSPITAL | Age: 26
End: 2021-11-04

## 2021-11-04 DIAGNOSIS — N20.1 URETEROLITHIASIS: Primary | ICD-10-CM

## 2021-11-04 DIAGNOSIS — R10.9 LEFT FLANK PAIN: ICD-10-CM

## 2021-11-04 LAB
ALBUMIN SERPL-MCNC: 4.4 G/DL (ref 3.5–5.2)
ALBUMIN/GLOB SERPL: 1.3 G/DL
ALP SERPL-CCNC: 77 U/L (ref 39–117)
ALT SERPL W P-5'-P-CCNC: 41 U/L (ref 1–41)
ANION GAP SERPL CALCULATED.3IONS-SCNC: 17 MMOL/L (ref 5–15)
AST SERPL-CCNC: 36 U/L (ref 1–40)
BACTERIA UR QL AUTO: ABNORMAL /HPF
BASOPHILS # BLD AUTO: 0 10*3/MM3 (ref 0–0.2)
BASOPHILS NFR BLD AUTO: 0.2 % (ref 0–1.5)
BILIRUB SERPL-MCNC: 0.4 MG/DL (ref 0–1.2)
BILIRUB UR QL STRIP: NEGATIVE
BUN SERPL-MCNC: 10 MG/DL (ref 6–20)
BUN/CREAT SERPL: 15.6 (ref 7–25)
CALCIUM SPEC-SCNC: 9 MG/DL (ref 8.6–10.5)
CHLORIDE SERPL-SCNC: 104 MMOL/L (ref 98–107)
CLARITY UR: ABNORMAL
CO2 SERPL-SCNC: 18 MMOL/L (ref 22–29)
COLOR UR: YELLOW
CREAT SERPL-MCNC: 0.64 MG/DL (ref 0.76–1.27)
DEPRECATED RDW RBC AUTO: 42.9 FL (ref 37–54)
EOSINOPHIL # BLD AUTO: 0 10*3/MM3 (ref 0–0.4)
EOSINOPHIL NFR BLD AUTO: 0.3 % (ref 0.3–6.2)
ERYTHROCYTE [DISTWIDTH] IN BLOOD BY AUTOMATED COUNT: 14.2 % (ref 12.3–15.4)
GFR SERPL CREATININE-BSD FRML MDRD: >150 ML/MIN/1.73
GLOBULIN UR ELPH-MCNC: 3.4 GM/DL
GLUCOSE SERPL-MCNC: 94 MG/DL (ref 65–99)
GLUCOSE UR STRIP-MCNC: NEGATIVE MG/DL
HCT VFR BLD AUTO: 42.4 % (ref 37.5–51)
HGB BLD-MCNC: 14 G/DL (ref 13–17.7)
HGB UR QL STRIP.AUTO: ABNORMAL
HOLD SPECIMEN: NORMAL
HYALINE CASTS UR QL AUTO: ABNORMAL /LPF
KETONES UR QL STRIP: ABNORMAL
LEUKOCYTE ESTERASE UR QL STRIP.AUTO: ABNORMAL
LIPASE SERPL-CCNC: 19 U/L (ref 13–60)
LYMPHOCYTES # BLD AUTO: 1.5 10*3/MM3 (ref 0.7–3.1)
LYMPHOCYTES NFR BLD AUTO: 11.5 % (ref 19.6–45.3)
MCH RBC QN AUTO: 28.3 PG (ref 26.6–33)
MCHC RBC AUTO-ENTMCNC: 33 G/DL (ref 31.5–35.7)
MCV RBC AUTO: 85.7 FL (ref 79–97)
MONOCYTES # BLD AUTO: 0.5 10*3/MM3 (ref 0.1–0.9)
MONOCYTES NFR BLD AUTO: 4 % (ref 5–12)
MUCOUS THREADS URNS QL MICRO: ABNORMAL /HPF
NEUTROPHILS NFR BLD AUTO: 11.3 10*3/MM3 (ref 1.7–7)
NEUTROPHILS NFR BLD AUTO: 84 % (ref 42.7–76)
NITRITE UR QL STRIP: NEGATIVE
NRBC BLD AUTO-RTO: 0 /100 WBC (ref 0–0.2)
PH UR STRIP.AUTO: 7 [PH] (ref 5–8)
PLATELET # BLD AUTO: 525 10*3/MM3 (ref 140–450)
PMV BLD AUTO: 6.7 FL (ref 6–12)
POTASSIUM SERPL-SCNC: 3.9 MMOL/L (ref 3.5–5.2)
PROT SERPL-MCNC: 7.8 G/DL (ref 6–8.5)
PROT UR QL STRIP: ABNORMAL
RBC # BLD AUTO: 4.94 10*6/MM3 (ref 4.14–5.8)
RBC # UR: ABNORMAL /HPF
REF LAB TEST METHOD: ABNORMAL
SODIUM SERPL-SCNC: 139 MMOL/L (ref 136–145)
SP GR UR STRIP: >=1.03 (ref 1–1.03)
SQUAMOUS #/AREA URNS HPF: ABNORMAL /HPF
UROBILINOGEN UR QL STRIP: ABNORMAL
WBC # BLD AUTO: 13.4 10*3/MM3 (ref 3.4–10.8)
WBC UR QL AUTO: ABNORMAL /HPF

## 2021-11-04 PROCEDURE — 80053 COMPREHEN METABOLIC PANEL: CPT | Performed by: NURSE PRACTITIONER

## 2021-11-04 PROCEDURE — 96376 TX/PRO/DX INJ SAME DRUG ADON: CPT

## 2021-11-04 PROCEDURE — 85025 COMPLETE CBC W/AUTO DIFF WBC: CPT | Performed by: NURSE PRACTITIONER

## 2021-11-04 PROCEDURE — 81001 URINALYSIS AUTO W/SCOPE: CPT | Performed by: NURSE PRACTITIONER

## 2021-11-04 PROCEDURE — 96374 THER/PROPH/DIAG INJ IV PUSH: CPT

## 2021-11-04 PROCEDURE — 99283 EMERGENCY DEPT VISIT LOW MDM: CPT

## 2021-11-04 PROCEDURE — 0 MORPHINE SULFATE 4 MG/ML SOLUTION: Performed by: NURSE PRACTITIONER

## 2021-11-04 PROCEDURE — 96375 TX/PRO/DX INJ NEW DRUG ADDON: CPT

## 2021-11-04 PROCEDURE — 25010000002 ONDANSETRON PER 1 MG: Performed by: NURSE PRACTITIONER

## 2021-11-04 PROCEDURE — 83690 ASSAY OF LIPASE: CPT | Performed by: NURSE PRACTITIONER

## 2021-11-04 PROCEDURE — 74176 CT ABD & PELVIS W/O CONTRAST: CPT

## 2021-11-04 RX ORDER — MORPHINE SULFATE 4 MG/ML
4 INJECTION, SOLUTION INTRAMUSCULAR; INTRAVENOUS ONCE
Status: COMPLETED | OUTPATIENT
Start: 2021-11-04 | End: 2021-11-04

## 2021-11-04 RX ORDER — HYDROCODONE BITARTRATE AND ACETAMINOPHEN 5; 325 MG/1; MG/1
1 TABLET ORAL EVERY 6 HOURS PRN
Qty: 12 TABLET | Refills: 0 | Status: SHIPPED | OUTPATIENT
Start: 2021-11-04 | End: 2021-11-06 | Stop reason: HOSPADM

## 2021-11-04 RX ORDER — SODIUM CHLORIDE 0.9 % (FLUSH) 0.9 %
10 SYRINGE (ML) INJECTION AS NEEDED
Status: DISCONTINUED | OUTPATIENT
Start: 2021-11-04 | End: 2021-11-05 | Stop reason: HOSPADM

## 2021-11-04 RX ORDER — ONDANSETRON 2 MG/ML
4 INJECTION INTRAMUSCULAR; INTRAVENOUS ONCE
Status: COMPLETED | OUTPATIENT
Start: 2021-11-04 | End: 2021-11-04

## 2021-11-04 RX ORDER — HYDROCODONE BITARTRATE AND ACETAMINOPHEN 5; 325 MG/1; MG/1
1 TABLET ORAL ONCE AS NEEDED
Status: COMPLETED | OUTPATIENT
Start: 2021-11-04 | End: 2021-11-04

## 2021-11-04 RX ADMIN — ONDANSETRON 4 MG: 2 INJECTION INTRAMUSCULAR; INTRAVENOUS at 21:19

## 2021-11-04 RX ADMIN — ONDANSETRON 4 MG: 2 INJECTION INTRAMUSCULAR; INTRAVENOUS at 23:24

## 2021-11-04 RX ADMIN — MORPHINE SULFATE 4 MG: 4 INJECTION INTRAVENOUS at 21:20

## 2021-11-04 RX ADMIN — HYDROCODONE BITARTRATE AND ACETAMINOPHEN 1 TABLET: 5; 325 TABLET ORAL at 23:37

## 2021-11-05 ENCOUNTER — APPOINTMENT (OUTPATIENT)
Dept: GENERAL RADIOLOGY | Facility: HOSPITAL | Age: 26
End: 2021-11-05

## 2021-11-05 ENCOUNTER — HOSPITAL ENCOUNTER (OUTPATIENT)
Facility: HOSPITAL | Age: 26
Discharge: HOME | End: 2021-11-06
Attending: EMERGENCY MEDICINE | Admitting: UROLOGY

## 2021-11-05 VITALS
HEIGHT: 69 IN | DIASTOLIC BLOOD PRESSURE: 80 MMHG | RESPIRATION RATE: 18 BRPM | HEART RATE: 80 BPM | BODY MASS INDEX: 46.65 KG/M2 | TEMPERATURE: 98 F | SYSTOLIC BLOOD PRESSURE: 130 MMHG | WEIGHT: 315 LBS | OXYGEN SATURATION: 98 %

## 2021-11-05 DIAGNOSIS — R10.9 LEFT FLANK PAIN: ICD-10-CM

## 2021-11-05 DIAGNOSIS — R11.2 NAUSEA AND VOMITING, INTRACTABILITY OF VOMITING NOT SPECIFIED, UNSPECIFIED VOMITING TYPE: ICD-10-CM

## 2021-11-05 DIAGNOSIS — N20.1 URETEROLITHIASIS: Primary | ICD-10-CM

## 2021-11-05 DIAGNOSIS — N13.2 URETERAL STONE WITH HYDRONEPHROSIS: ICD-10-CM

## 2021-11-05 LAB
ALBUMIN SERPL-MCNC: 4.2 G/DL (ref 3.5–5.2)
ALBUMIN/GLOB SERPL: 1.2 G/DL
ALP SERPL-CCNC: 71 U/L (ref 39–117)
ALT SERPL W P-5'-P-CCNC: 36 U/L (ref 1–41)
ANION GAP SERPL CALCULATED.3IONS-SCNC: 17 MMOL/L (ref 5–15)
AST SERPL-CCNC: 35 U/L (ref 1–40)
BACTERIA UR QL AUTO: ABNORMAL /HPF
BASOPHILS # BLD AUTO: 0 10*3/MM3 (ref 0–0.2)
BASOPHILS NFR BLD AUTO: 0.3 % (ref 0–1.5)
BILIRUB SERPL-MCNC: 0.5 MG/DL (ref 0–1.2)
BILIRUB UR QL STRIP: NEGATIVE
BUN SERPL-MCNC: 14 MG/DL (ref 6–20)
BUN/CREAT SERPL: 16.9 (ref 7–25)
CALCIUM SPEC-SCNC: 8.9 MG/DL (ref 8.6–10.5)
CHLORIDE SERPL-SCNC: 102 MMOL/L (ref 98–107)
CLARITY UR: ABNORMAL
CO2 SERPL-SCNC: 19 MMOL/L (ref 22–29)
COD CRY URNS QL: ABNORMAL /HPF
COLOR UR: ABNORMAL
CREAT SERPL-MCNC: 0.83 MG/DL (ref 0.76–1.27)
DEPRECATED RDW RBC AUTO: 42.4 FL (ref 37–54)
EOSINOPHIL # BLD AUTO: 0 10*3/MM3 (ref 0–0.4)
EOSINOPHIL NFR BLD AUTO: 0 % (ref 0.3–6.2)
ERYTHROCYTE [DISTWIDTH] IN BLOOD BY AUTOMATED COUNT: 14.2 % (ref 12.3–15.4)
GFR SERPL CREATININE-BSD FRML MDRD: 112 ML/MIN/1.73
GLOBULIN UR ELPH-MCNC: 3.5 GM/DL
GLUCOSE SERPL-MCNC: 106 MG/DL (ref 65–99)
GLUCOSE UR STRIP-MCNC: NEGATIVE MG/DL
HCT VFR BLD AUTO: 39.3 % (ref 37.5–51)
HGB BLD-MCNC: 13.2 G/DL (ref 13–17.7)
HGB UR QL STRIP.AUTO: ABNORMAL
HYALINE CASTS UR QL AUTO: ABNORMAL /LPF
KETONES UR QL STRIP: ABNORMAL
LEUKOCYTE ESTERASE UR QL STRIP.AUTO: NEGATIVE
LIPASE SERPL-CCNC: 17 U/L (ref 13–60)
LYMPHOCYTES # BLD AUTO: 1.2 10*3/MM3 (ref 0.7–3.1)
LYMPHOCYTES NFR BLD AUTO: 7.6 % (ref 19.6–45.3)
MCH RBC QN AUTO: 28.4 PG (ref 26.6–33)
MCHC RBC AUTO-ENTMCNC: 33.5 G/DL (ref 31.5–35.7)
MCV RBC AUTO: 84.9 FL (ref 79–97)
MONOCYTES # BLD AUTO: 0.7 10*3/MM3 (ref 0.1–0.9)
MONOCYTES NFR BLD AUTO: 4.7 % (ref 5–12)
MUCOUS THREADS URNS QL MICRO: ABNORMAL /HPF
NEUTROPHILS NFR BLD AUTO: 13.6 10*3/MM3 (ref 1.7–7)
NEUTROPHILS NFR BLD AUTO: 87.4 % (ref 42.7–76)
NITRITE UR QL STRIP: NEGATIVE
NRBC BLD AUTO-RTO: 0.1 /100 WBC (ref 0–0.2)
PH UR STRIP.AUTO: 5.5 [PH] (ref 5–8)
PLATELET # BLD AUTO: 438 10*3/MM3 (ref 140–450)
PMV BLD AUTO: 6.5 FL (ref 6–12)
POTASSIUM SERPL-SCNC: 3.8 MMOL/L (ref 3.5–5.2)
PROT SERPL-MCNC: 7.7 G/DL (ref 6–8.5)
PROT UR QL STRIP: ABNORMAL
RBC # BLD AUTO: 4.63 10*6/MM3 (ref 4.14–5.8)
RBC # UR: ABNORMAL /HPF
REF LAB TEST METHOD: ABNORMAL
SARS-COV-2 RNA PNL SPEC NAA+PROBE: NOT DETECTED
SODIUM SERPL-SCNC: 138 MMOL/L (ref 136–145)
SP GR UR STRIP: 1.03 (ref 1–1.03)
SQUAMOUS #/AREA URNS HPF: ABNORMAL /HPF
TRANS CELLS #/AREA URNS HPF: ABNORMAL /HPF
UROBILINOGEN UR QL STRIP: ABNORMAL
WBC # BLD AUTO: 15.6 10*3/MM3 (ref 3.4–10.8)
WBC UR QL AUTO: ABNORMAL /HPF
YEAST URNS QL MICRO: ABNORMAL /HPF

## 2021-11-05 PROCEDURE — 83690 ASSAY OF LIPASE: CPT | Performed by: NURSE PRACTITIONER

## 2021-11-05 PROCEDURE — G0378 HOSPITAL OBSERVATION PER HR: HCPCS

## 2021-11-05 PROCEDURE — 87086 URINE CULTURE/COLONY COUNT: CPT | Performed by: NURSE PRACTITIONER

## 2021-11-05 PROCEDURE — 99283 EMERGENCY DEPT VISIT LOW MDM: CPT

## 2021-11-05 PROCEDURE — 87635 SARS-COV-2 COVID-19 AMP PRB: CPT | Performed by: EMERGENCY MEDICINE

## 2021-11-05 PROCEDURE — 25010000002 ONDANSETRON PER 1 MG: Performed by: NURSE PRACTITIONER

## 2021-11-05 PROCEDURE — C9803 HOPD COVID-19 SPEC COLLECT: HCPCS

## 2021-11-05 PROCEDURE — 96375 TX/PRO/DX INJ NEW DRUG ADDON: CPT

## 2021-11-05 PROCEDURE — 96374 THER/PROPH/DIAG INJ IV PUSH: CPT

## 2021-11-05 PROCEDURE — 96376 TX/PRO/DX INJ SAME DRUG ADON: CPT

## 2021-11-05 PROCEDURE — 0 MORPHINE SULFATE 4 MG/ML SOLUTION: Performed by: NURSE PRACTITIONER

## 2021-11-05 PROCEDURE — 85025 COMPLETE CBC W/AUTO DIFF WBC: CPT | Performed by: NURSE PRACTITIONER

## 2021-11-05 PROCEDURE — 81001 URINALYSIS AUTO W/SCOPE: CPT | Performed by: NURSE PRACTITIONER

## 2021-11-05 PROCEDURE — 74018 RADEX ABDOMEN 1 VIEW: CPT

## 2021-11-05 PROCEDURE — 80053 COMPREHEN METABOLIC PANEL: CPT | Performed by: NURSE PRACTITIONER

## 2021-11-05 PROCEDURE — 25010000002 KETOROLAC TROMETHAMINE PER 15 MG: Performed by: NURSE PRACTITIONER

## 2021-11-05 RX ORDER — CHOLECALCIFEROL (VITAMIN D3) 125 MCG
5 CAPSULE ORAL NIGHTLY PRN
Status: DISCONTINUED | OUTPATIENT
Start: 2021-11-05 | End: 2021-11-06 | Stop reason: HOSPADM

## 2021-11-05 RX ORDER — KETOROLAC TROMETHAMINE 15 MG/ML
15 INJECTION, SOLUTION INTRAMUSCULAR; INTRAVENOUS EVERY 6 HOURS PRN
Status: DISCONTINUED | OUTPATIENT
Start: 2021-11-05 | End: 2021-11-06 | Stop reason: HOSPADM

## 2021-11-05 RX ORDER — SODIUM CHLORIDE 0.9 % (FLUSH) 0.9 %
10 SYRINGE (ML) INJECTION EVERY 12 HOURS SCHEDULED
Status: DISCONTINUED | OUTPATIENT
Start: 2021-11-05 | End: 2021-11-06 | Stop reason: HOSPADM

## 2021-11-05 RX ORDER — ONDANSETRON 2 MG/ML
4 INJECTION INTRAMUSCULAR; INTRAVENOUS ONCE
Status: COMPLETED | OUTPATIENT
Start: 2021-11-05 | End: 2021-11-05

## 2021-11-05 RX ORDER — ONDANSETRON 2 MG/ML
4 INJECTION INTRAMUSCULAR; INTRAVENOUS EVERY 6 HOURS PRN
Status: DISCONTINUED | OUTPATIENT
Start: 2021-11-05 | End: 2021-11-06 | Stop reason: HOSPADM

## 2021-11-05 RX ORDER — KETOROLAC TROMETHAMINE 15 MG/ML
15 INJECTION, SOLUTION INTRAMUSCULAR; INTRAVENOUS ONCE
Status: COMPLETED | OUTPATIENT
Start: 2021-11-05 | End: 2021-11-05

## 2021-11-05 RX ORDER — ACETAMINOPHEN 325 MG/1
650 TABLET ORAL EVERY 4 HOURS PRN
Status: DISCONTINUED | OUTPATIENT
Start: 2021-11-05 | End: 2021-11-06 | Stop reason: HOSPADM

## 2021-11-05 RX ORDER — SODIUM CHLORIDE 0.9 % (FLUSH) 0.9 %
10 SYRINGE (ML) INJECTION AS NEEDED
Status: DISCONTINUED | OUTPATIENT
Start: 2021-11-05 | End: 2021-11-06 | Stop reason: HOSPADM

## 2021-11-05 RX ORDER — MORPHINE SULFATE 4 MG/ML
4 INJECTION, SOLUTION INTRAMUSCULAR; INTRAVENOUS EVERY 4 HOURS PRN
Status: DISCONTINUED | OUTPATIENT
Start: 2021-11-06 | End: 2021-11-06 | Stop reason: HOSPADM

## 2021-11-05 RX ORDER — MORPHINE SULFATE 4 MG/ML
4 INJECTION, SOLUTION INTRAMUSCULAR; INTRAVENOUS ONCE
Status: COMPLETED | OUTPATIENT
Start: 2021-11-05 | End: 2021-11-05

## 2021-11-05 RX ADMIN — SODIUM CHLORIDE 1000 ML: 9 INJECTION, SOLUTION INTRAVENOUS at 18:30

## 2021-11-05 RX ADMIN — KETOROLAC TROMETHAMINE 15 MG: 15 INJECTION, SOLUTION INTRAMUSCULAR; INTRAVENOUS at 18:25

## 2021-11-05 RX ADMIN — LIDOCAINE HYDROCHLORIDE 150 MG: 10 INJECTION, SOLUTION INFILTRATION; PERINEURAL at 18:27

## 2021-11-05 RX ADMIN — ONDANSETRON 4 MG: 2 INJECTION INTRAMUSCULAR; INTRAVENOUS at 18:25

## 2021-11-05 RX ADMIN — MORPHINE SULFATE 4 MG: 4 INJECTION INTRAVENOUS at 00:13

## 2021-11-05 NOTE — ED PROVIDER NOTES
Subjective    Chief Complaint   Patient presents with   • Flank Pain     left side flank pain, vomiting today. pt states feels like kidney stone     Nabil Jason MD  No LMP for male patient.  No Known Allergies    History of Present Illness  Patient is a 26-year-old male, identifying as female, presents to emergency department with complaint of left flank pain nausea and vomiting.  Patient reports similar symptoms in the past with kidney stones, has had numerous kidney stones in the past, including ureteral stent placement.  Patient reports this began today with associated vomiting.  No fever or chills.  No diarrhea.  No aggravating or relieving factors.  Pain radiates from left leg into left groin.  Review of Systems   Constitutional: Negative for chills and fever.   Respiratory: Negative for chest tightness and shortness of breath.    Cardiovascular: Negative for chest pain.   Gastrointestinal: Positive for abdominal pain, nausea and vomiting. Negative for diarrhea.   Genitourinary: Positive for flank pain. Negative for decreased urine volume, difficulty urinating, dysuria, enuresis, frequency, genital sores, hematuria, penile discharge, penile pain, penile swelling, scrotal swelling, testicular pain and urgency.   Musculoskeletal: Negative for back pain and neck pain.   Skin: Negative for color change and rash.   Neurological: Negative for dizziness, syncope, weakness and light-headedness.       Past Medical History:   Diagnosis Date   • Constipation due to opioid therapy 6/7/2016   • Depression with anxiety 1/8/2020   • Gastroesophageal reflux disease without esophagitis 5/21/2020   • Gender dysphoria    • Hormonal imbalance in transgender patient 1/8/2020   • Kidney calculi    • Obesity, Class III, BMI 40-49.9 (morbid obesity) (CMS/[facility]) 8/4/2020       No Known Allergies    Past Surgical History:   Procedure Laterality Date   • CYSTOSCOPY BLADDER STONE LITHOTRIPSY     • CYSTOSCOPY, URETEROSCOPY,  RETROGRADE PYELOGRAM, STENT INSERTION Right 8/4/2020    Procedure: CYSTOSCOPY WITH RIGHT URETERAL STENT PLACEMENT;  Surgeon: Delano Hester MD;  Location: [facility] MAIN OR;  Service: Urology;  Laterality: Right;   • WISDOM TOOTH EXTRACTION Bilateral     top and bottom       Family History   Problem Relation Age of Onset   • Hypertension Maternal Grandmother    • Diabetes type II Paternal Grandfather        Social History     Socioeconomic History   • Marital status: Single   Tobacco Use   • Smoking status: Never Smoker   • Smokeless tobacco: Never Used   Substance and Sexual Activity   • Alcohol use: Yes     Alcohol/week: 2.0 standard drinks     Types: 2 Cans of beer per week   • Drug use: Never   • Sexual activity: Yes     Partners: Female     Birth control/protection: Condom           Objective   Physical Exam  Vitals and nursing note reviewed.   Constitutional:       General: He is not in acute distress.     Appearance: Normal appearance. He is obese. He is not ill-appearing, toxic-appearing or diaphoretic.   HENT:      Head: Normocephalic and atraumatic.      Nose: Nose normal.      Mouth/Throat:      Mouth: Mucous membranes are moist.      Pharynx: Oropharynx is clear.   Eyes:      Extraocular Movements: Extraocular movements intact.      Conjunctiva/sclera: Conjunctivae normal.      Pupils: Pupils are equal, round, and reactive to light.   Cardiovascular:      Rate and Rhythm: Normal rate and regular rhythm.      Heart sounds: Normal heart sounds. No murmur heard.  No friction rub. No gallop.    Pulmonary:      Effort: Pulmonary effort is normal.      Breath sounds: Normal breath sounds.   Abdominal:      General: Bowel sounds are normal.      Palpations: Abdomen is soft.      Tenderness: There is no abdominal tenderness. There is no right CVA tenderness, left CVA tenderness, guarding or rebound.   Musculoskeletal:         General: Normal range of motion.   Skin:     General: Skin is warm and dry.       "Capillary Refill: Capillary refill takes less than 2 seconds.      Findings: No erythema or rash.   Neurological:      General: No focal deficit present.      Mental Status: He is alert and oriented to person, place, and time.   Psychiatric:         Mood and Affect: Mood normal.         Behavior: Behavior normal.         Procedures           ED Course  ED Course as of 11/05/21 0043   Thu Nov 04, 2021   8430 Consult with Dr. Hester [LB]      ED Course User Index  [LB] Leonides Nette M, APRN      /80 (BP Location: Right arm, Patient Position: Sitting)   Pulse 80   Temp 98 °F (36.7 °C) (Oral)   Resp 18   Ht 175.3 cm (69\")   Wt (!) 147 kg (324 lb)   SpO2 98%   BMI 47.85 kg/m²   Labs Reviewed   COMPREHENSIVE METABOLIC PANEL - Abnormal; Notable for the following components:       Result Value    Creatinine 0.64 (*)     CO2 18.0 (*)     Anion Gap 17.0 (*)     All other components within normal limits    Narrative:     GFR Normal >60  Chronic Kidney Disease <60  Kidney Failure <15     URINALYSIS W/ CULTURE IF INDICATED - Abnormal; Notable for the following components:    Appearance, UA Cloudy (*)     Ketones, UA 15 mg/dL (1+) (*)     Blood, UA Large (3+) (*)     Protein, UA 30 mg/dL (1+) (*)     Leuk Esterase, UA Trace (*)     All other components within normal limits   CBC WITH AUTO DIFFERENTIAL - Abnormal; Notable for the following components:    WBC 13.40 (*)     Platelets 525 (*)     Neutrophil % 84.0 (*)     Lymphocyte % 11.5 (*)     Monocyte % 4.0 (*)     Neutrophils, Absolute 11.30 (*)     All other components within normal limits   URINALYSIS, MICROSCOPIC ONLY - Abnormal; Notable for the following components:    RBC, UA Too Numerous to Count (*)     WBC, UA 0-2 (*)     Bacteria, UA Trace (*)     Squamous Epithelial Cells, UA 3-6 (*)     Mucus, UA Small/1+ (*)     All other components within normal limits   LIPASE - Normal   CBC AND DIFFERENTIAL    Narrative:     The following orders were created for " panel order CBC & Differential.  Procedure                               Abnormality         Status                     ---------                               -----------         ------                     CBC Auto Differential[[id number]]        Abnormal            Final result                 Please view results for these tests on the individual orders.   EXTRA TUBES    Narrative:     The following orders were created for panel order Extra Tubes.  Procedure                               Abnormality         Status                     ---------                               -----------         ------                     Gold Top - SST[[id number]]                                   Final result                 Please view results for these tests on the individual orders.   GOLD TOP - SST     Medications   sodium chloride 0.9 % flush 10 mL (has no administration in time range)   Morphine sulfate (PF) injection 4 mg (4 mg Intravenous Given 11/4/21 2120)   ondansetron (ZOFRAN) injection 4 mg (4 mg Intravenous Given 11/4/21 2119)   HYDROcodone-acetaminophen (NORCO) 5-325 MG per tablet 1 tablet (1 tablet Oral Given 11/4/21 2337)   ondansetron (ZOFRAN) injection 4 mg (4 mg Intravenous Given 11/4/21 2324)   Morphine sulfate (PF) injection 4 mg (4 mg Intravenous Given 11/5/21 0013)     CT Abdomen Pelvis Without Contrast    Result Date: 11/4/2021   1. There is a 3 mm left UPJ stone causing mild to moderate obstructing uropathy. No other renal or ureteral stones. 2. Mildly enlarged diffusely fatty infiltrated liver.  Electronically Signed By-Fredy Leung DO On:11/4/2021 10:50 PM This report was finalized on [id number] by  Fredy Leung DO.         Patient INSPECT report queried and reviewed.  I discussed with the patient the risk and benefits associated with opiate/narcotic pain medication today.  Based on patient's exam findings and assessment, I do feel it appropriate to prescribe a short course of opiate/ narcotic pain  medication from the emergency department.  The patient was advised of the risks associated with such medication, including risk of dependency.  Patient was advised of medication administration instructions, appropriate use, potential side effects and adverse reactions.  Acknowledged and verbalized understanding, and agrees to treatment.                                 MDM  Number of Diagnoses or Management Options     Amount and/or Complexity of Data Reviewed  Clinical lab tests: reviewed  Tests in the radiology section of CPT®: reviewed  Review and summarize past medical records: yes (Reviewed patient's ED visit 3/30/2021 diagnosed with a left ureteral stone.)    Appropriate PPE was worn during the duration of the care for this patient while in the emergency department per [facility] Policy  ----Differentials: Ureterolithiasis, pyelonephritis, musculoskeletal pain  This list is not all inclusive and does not constitute the entireity of considered causes.   ED  Course----Patient was brought back to the emergency department room for evaluation and placed on appropriate monitoring.      Patient had IV established and blood work obtained.      ----Labs Reviewed by me.  White blood cell count 13.4 CMP unremarkable.  Urine to numerous count red blood cells, 0-2 white with cells, trace bacteria, squamous epithelials.  Does not appear to be indicative of infection.    ----Imaging ordered as above.  Interpreted per radiologist and independently viewed by myself.  As above  ----Consults: Dr. Hester, urology patient to call tomorrow to establish follow-up    Patient prefers discharge home.  As patient was being discharged, she began to eat, and had an episode of emesis.  P.o. pain medication was held until IV antiemetics were given, and then oral medication given and tolerated well.  Upon recheck patient reports pain has returned, was given dose of morphine, but still prefers discharge home.    Vital signs have  been  reviewed. Patient is afebrile. Non toxic in appearance. Alert and oriented to person, place, time and situation.  I spoke with the patient at the bedside regarding their plan of care, discharge instruction, home care, prescriptions, and importance follow-up.  We discussed test results at the bedside, including incidental abnormal labs, radiological findings, understands need for follow-up with primary care or specialist if indicated.      Patient was made aware of indications to return to the emergency department.  Patient agrees with the current plan of care for discharge, verbalized understanding of all instructions    Pt is aware that discharge does not mean that nothing is wrong but it indicates no emergency is present and they must continue care with follow-up as given below or physician of their choice                            Final diagnoses:   Ureterolithiasis   Left flank pain       ED Disposition  ED Disposition     ED Disposition Condition Comment    Discharge Stable           [facility] EMERGENCY DEPARTMENT  [facility] [address] [phone]    As needed, If symptoms worsen    Nabil Jason MD  [address] [place] [address]  [phone]    Schedule an appointment as soon as possible for a visit            Medication List      Changed    * HYDROcodone-acetaminophen 7.5-325 MG per tablet  Commonly known as: NORCO  Take 1-2 tablets by mouth Every 6 (Six) Hours As Needed for Moderate Pain .  What changed: Another medication with the same name was added. Make sure you understand how and when to take each.     * HYDROcodone-acetaminophen 5-325 MG per tablet  Commonly known as: NORCO  Take 1 tablet by mouth Every 6 (Six) Hours As Needed for Moderate Pain .  What changed: You were already taking a medication with the same name, and this prescription was added. Make sure you understand how and when to take each.         * This list has 2 medication(s)  that are the same as other medications prescribed for you. Read the directions carefully, and ask your doctor or other care provider to review them with you.               Where to Get Your Medications      These medications were sent to [id number] - [place], [place] - [address] AT [facility] - [phone]  - [phone]   [address], [place] [address]    Phone: [phone]   · HYDROcodone-acetaminophen 5-325 MG per tablet          Nette Coyle, APRN  11/05/21 0043

## 2021-11-05 NOTE — ED NOTES
Pt w/a hx of kidney stones complains of left side flank pain radiating to their groin. Pt stated the the pain began around 1600 today. Pt denies any difficulty urinating. Pt stated they've have multiple vomiting episodes. Pt denies any diarrhea or fever.      Juaquin Latham, RN  11/04/21 2046

## 2021-11-05 NOTE — DISCHARGE INSTRUCTIONS
Please call Cassandra in the urology office at [phone] to get an appointment scheduled, call in the morning  Return to the ED for new or worsening symptoms: Specifically fever 100.4 or greater, nausea, vomiting, severe pain  Strain urine  Recommend taking a stool softener while taking pain medication

## 2021-11-06 ENCOUNTER — ANESTHESIA EVENT (OUTPATIENT)
Dept: PERIOP | Facility: HOSPITAL | Age: 26
End: 2021-11-06

## 2021-11-06 ENCOUNTER — ANESTHESIA (OUTPATIENT)
Dept: PERIOP | Facility: HOSPITAL | Age: 26
End: 2021-11-06

## 2021-11-06 ENCOUNTER — READMISSION MANAGEMENT (OUTPATIENT)
Dept: CALL CENTER | Facility: HOSPITAL | Age: 26
End: 2021-11-06

## 2021-11-06 VITALS
WEIGHT: 315 LBS | HEART RATE: 98 BPM | RESPIRATION RATE: 20 BRPM | DIASTOLIC BLOOD PRESSURE: 81 MMHG | HEIGHT: 69 IN | OXYGEN SATURATION: 94 % | BODY MASS INDEX: 46.65 KG/M2 | TEMPERATURE: 99 F | SYSTOLIC BLOOD PRESSURE: 116 MMHG

## 2021-11-06 LAB
ANION GAP SERPL CALCULATED.3IONS-SCNC: 11 MMOL/L (ref 5–15)
BACTERIA SPEC AEROBE CULT: NO GROWTH
BASOPHILS # BLD AUTO: 0.1 10*3/MM3 (ref 0–0.2)
BASOPHILS NFR BLD AUTO: 0.7 % (ref 0–1.5)
BUN SERPL-MCNC: 11 MG/DL (ref 6–20)
BUN/CREAT SERPL: 22.9 (ref 7–25)
CALCIUM SPEC-SCNC: 8.2 MG/DL (ref 8.6–10.5)
CHLORIDE SERPL-SCNC: 108 MMOL/L (ref 98–107)
CO2 SERPL-SCNC: 22 MMOL/L (ref 22–29)
CREAT SERPL-MCNC: 0.48 MG/DL (ref 0.76–1.27)
DEPRECATED RDW RBC AUTO: 42 FL (ref 37–54)
EOSINOPHIL # BLD AUTO: 0.1 10*3/MM3 (ref 0–0.4)
EOSINOPHIL NFR BLD AUTO: 1 % (ref 0.3–6.2)
ERYTHROCYTE [DISTWIDTH] IN BLOOD BY AUTOMATED COUNT: 14.1 % (ref 12.3–15.4)
GFR SERPL CREATININE-BSD FRML MDRD: >150 ML/MIN/1.73
GLUCOSE SERPL-MCNC: 99 MG/DL (ref 65–99)
HCT VFR BLD AUTO: 35.7 % (ref 37.5–51)
HGB BLD-MCNC: 12 G/DL (ref 13–17.7)
LYMPHOCYTES # BLD AUTO: 2.7 10*3/MM3 (ref 0.7–3.1)
LYMPHOCYTES NFR BLD AUTO: 26.2 % (ref 19.6–45.3)
MCH RBC QN AUTO: 28.2 PG (ref 26.6–33)
MCHC RBC AUTO-ENTMCNC: 33.6 G/DL (ref 31.5–35.7)
MCV RBC AUTO: 84 FL (ref 79–97)
MONOCYTES # BLD AUTO: 0.8 10*3/MM3 (ref 0.1–0.9)
MONOCYTES NFR BLD AUTO: 7.6 % (ref 5–12)
NEUTROPHILS NFR BLD AUTO: 6.6 10*3/MM3 (ref 1.7–7)
NEUTROPHILS NFR BLD AUTO: 64.5 % (ref 42.7–76)
NRBC BLD AUTO-RTO: 0.2 /100 WBC (ref 0–0.2)
PLATELET # BLD AUTO: 436 10*3/MM3 (ref 140–450)
PMV BLD AUTO: 6.7 FL (ref 6–12)
POTASSIUM SERPL-SCNC: 3.6 MMOL/L (ref 3.5–5.2)
RBC # BLD AUTO: 4.25 10*6/MM3 (ref 4.14–5.8)
SODIUM SERPL-SCNC: 141 MMOL/L (ref 136–145)
WBC # BLD AUTO: 10.2 10*3/MM3 (ref 3.4–10.8)

## 2021-11-06 PROCEDURE — 25010000002 KETOROLAC TROMETHAMINE PER 15 MG: Performed by: ANESTHESIOLOGY

## 2021-11-06 PROCEDURE — G0378 HOSPITAL OBSERVATION PER HR: HCPCS

## 2021-11-06 PROCEDURE — 25010000002 PROPOFOL 200 MG/20ML EMULSION: Performed by: ANESTHESIOLOGY

## 2021-11-06 PROCEDURE — 25010000002 ONDANSETRON PER 1 MG: Performed by: ANESTHESIOLOGY

## 2021-11-06 PROCEDURE — 80048 BASIC METABOLIC PNL TOTAL CA: CPT | Performed by: NURSE PRACTITIONER

## 2021-11-06 PROCEDURE — 96376 TX/PRO/DX INJ SAME DRUG ADON: CPT

## 2021-11-06 PROCEDURE — 36415 COLL VENOUS BLD VENIPUNCTURE: CPT | Performed by: NURSE PRACTITIONER

## 2021-11-06 PROCEDURE — C1758 CATHETER, URETERAL: HCPCS | Performed by: UROLOGY

## 2021-11-06 PROCEDURE — C1769 GUIDE WIRE: HCPCS | Performed by: UROLOGY

## 2021-11-06 PROCEDURE — 0 IOHEXOL 300 MG/ML SOLUTION: Performed by: UROLOGY

## 2021-11-06 PROCEDURE — C2617 STENT, NON-COR, TEM W/O DEL: HCPCS | Performed by: UROLOGY

## 2021-11-06 PROCEDURE — 25010000002 FENTANYL CITRATE (PF) 100 MCG/2ML SOLUTION: Performed by: ANESTHESIOLOGY

## 2021-11-06 PROCEDURE — 25010000002 CEFAZOLIN PER 500 MG

## 2021-11-06 PROCEDURE — 82365 CALCULUS SPECTROSCOPY: CPT | Performed by: UROLOGY

## 2021-11-06 PROCEDURE — 85025 COMPLETE CBC W/AUTO DIFF WBC: CPT | Performed by: NURSE PRACTITIONER

## 2021-11-06 PROCEDURE — 25010000002 DEXAMETHASONE PER 1 MG: Performed by: ANESTHESIOLOGY

## 2021-11-06 PROCEDURE — 25010000002 KETOROLAC TROMETHAMINE PER 15 MG: Performed by: NURSE PRACTITIONER

## 2021-11-06 DEVICE — URETERAL STENT
Type: IMPLANTABLE DEVICE | Site: URETER | Status: FUNCTIONAL
Brand: PERCUFLEX™ PLUS

## 2021-11-06 RX ORDER — FENTANYL CITRATE 50 UG/ML
INJECTION, SOLUTION INTRAMUSCULAR; INTRAVENOUS AS NEEDED
Status: DISCONTINUED | OUTPATIENT
Start: 2021-11-06 | End: 2021-11-06 | Stop reason: SURG

## 2021-11-06 RX ORDER — ONDANSETRON 2 MG/ML
INJECTION INTRAMUSCULAR; INTRAVENOUS AS NEEDED
Status: DISCONTINUED | OUTPATIENT
Start: 2021-11-06 | End: 2021-11-06 | Stop reason: SURG

## 2021-11-06 RX ORDER — ROCURONIUM BROMIDE 10 MG/ML
INJECTION, SOLUTION INTRAVENOUS AS NEEDED
Status: DISCONTINUED | OUTPATIENT
Start: 2021-11-06 | End: 2021-11-06 | Stop reason: SURG

## 2021-11-06 RX ORDER — CEFAZOLIN SODIUM IN 0.9 % NACL 3 G/100 ML
3 INTRAVENOUS SOLUTION, PIGGYBACK (ML) INTRAVENOUS ONCE
Status: COMPLETED | OUTPATIENT
Start: 2021-11-06 | End: 2021-11-06

## 2021-11-06 RX ORDER — DEXAMETHASONE SODIUM PHOSPHATE 4 MG/ML
INJECTION, SOLUTION INTRA-ARTICULAR; INTRALESIONAL; INTRAMUSCULAR; INTRAVENOUS; SOFT TISSUE AS NEEDED
Status: DISCONTINUED | OUTPATIENT
Start: 2021-11-06 | End: 2021-11-06 | Stop reason: SURG

## 2021-11-06 RX ORDER — KETOROLAC TROMETHAMINE 30 MG/ML
INJECTION, SOLUTION INTRAMUSCULAR; INTRAVENOUS AS NEEDED
Status: DISCONTINUED | OUTPATIENT
Start: 2021-11-06 | End: 2021-11-06 | Stop reason: SURG

## 2021-11-06 RX ORDER — PROPOFOL 10 MG/ML
INJECTION, EMULSION INTRAVENOUS AS NEEDED
Status: DISCONTINUED | OUTPATIENT
Start: 2021-11-06 | End: 2021-11-06 | Stop reason: SURG

## 2021-11-06 RX ORDER — CEPHALEXIN 500 MG/1
500 CAPSULE ORAL 2 TIMES DAILY
Qty: 14 CAPSULE | Refills: 0 | Status: SHIPPED | OUTPATIENT
Start: 2021-11-06 | End: 2021-11-13

## 2021-11-06 RX ADMIN — DEXAMETHASONE SODIUM PHOSPHATE 4 MG: 4 INJECTION, SOLUTION INTRAMUSCULAR; INTRAVENOUS at 07:53

## 2021-11-06 RX ADMIN — PROPOFOL 200 MG: 10 INJECTION, EMULSION INTRAVENOUS at 07:45

## 2021-11-06 RX ADMIN — KETOROLAC TROMETHAMINE 30 MG: 30 INJECTION, SOLUTION INTRAMUSCULAR at 07:53

## 2021-11-06 RX ADMIN — ROCURONIUM BROMIDE 40 MG: 10 INJECTION INTRAVENOUS at 07:45

## 2021-11-06 RX ADMIN — Medication 10 ML: at 09:00

## 2021-11-06 RX ADMIN — ONDANSETRON 4 MG: 2 INJECTION INTRAMUSCULAR; INTRAVENOUS at 07:53

## 2021-11-06 RX ADMIN — KETOROLAC TROMETHAMINE 15 MG: 15 INJECTION, SOLUTION INTRAMUSCULAR; INTRAVENOUS at 00:03

## 2021-11-06 RX ADMIN — FENTANYL CITRATE 100 MCG: 50 INJECTION, SOLUTION INTRAMUSCULAR; INTRAVENOUS at 07:45

## 2021-11-06 RX ADMIN — Medication 10 ML: at 00:03

## 2021-11-06 RX ADMIN — Medication 3 G: at 07:45

## 2021-11-06 NOTE — PLAN OF CARE
Goal Outcome Evaluation:  Plan of Care Reviewed With: patient        Progress: no change  Outcome Summary: Pt resting abed AOx4 on RA no c/o voiced at this time

## 2021-11-06 NOTE — ANESTHESIA PREPROCEDURE EVALUATION
Anesthesia Evaluation     Patient summary reviewed and Nursing notes reviewed   NPO Solid Status: > 6 hours  NPO Liquid Status: > 6 hours           Airway   Mallampati: II  TM distance: >3 FB  Neck ROM: full  No difficulty expected  Dental - normal exam     Pulmonary - negative pulmonary ROS and normal exam    breath sounds clear to auscultation  Cardiovascular - negative cardio ROS and normal exam    ECG reviewed  Rhythm: regular  Rate: normal        Neuro/Psych  (+) psychiatric history,     GI/Hepatic/Renal/Endo    (+) morbid obesity, GERD,  renal disease,     Musculoskeletal (-) negative ROS    Abdominal  - normal exam    Abdomen: soft.  Bowel sounds: normal.   Substance History - negative use     OB/GYN negative ob/gyn ROS         Other - negative ROS                       Anesthesia Plan    ASA 3 - emergent     general     intravenous induction     Anesthetic plan, all risks, benefits, and alternatives have been provided, discussed and informed consent has been obtained with: patient.

## 2021-11-06 NOTE — ANESTHESIA POSTPROCEDURE EVALUATION
Patient: Amos Powell    Procedure Summary     Date: 11/06/21 Room / Location: [facility] OR  / [facility] MAIN OR    Anesthesia Start: 0745 Anesthesia Stop: 0822    Procedure: CYSTOSCOPY LEFT STENT INSERTION (Left ) Diagnosis:     Surgeons: Jayden Cox MD Provider: Silverio Coley MD    Anesthesia Type: general ASA Status: 3 - Emergent          Anesthesia Type: general    Vitals  Vitals Value Taken Time   /77 11/06/21 0825   Temp 98.6 °F (37 °C) 11/06/21 0817   Pulse 96 11/06/21 0824   Resp 19 11/06/21 0817   SpO2 93 % 11/06/21 0824   Vitals shown include unvalidated device data.        Post Anesthesia Care and Evaluation    Patient location during evaluation: bedside  Patient participation: complete - patient participated  Level of consciousness: awake and alert  Pain score: 1  Pain management: adequate  Airway patency: patent  Anesthetic complications: No anesthetic complications  PONV Status: none  Cardiovascular status: acceptable  Respiratory status: acceptable  Hydration status: acceptable  Post Neuraxial Block status: Motor and sensory function returned to baseline

## 2021-11-06 NOTE — CONSULTS
FIRST UROLOGY CONSULT      Patient Identification:  NAME:  Amos Powell  Age:  26 y.o.   Sex:  male   :  1995   MRN:  [id number]       Chief complaint: Left stone    History of present illness:  27 y/o male to female transgender with stone.  Here for worsening pain       Past medical history:  Past Medical History:   Diagnosis Date   • Constipation due to opioid therapy 2016   • Depression with anxiety 2020   • Gastroesophageal reflux disease without esophagitis 2020   • Gender dysphoria    • Hormonal imbalance in transgender patient 2020   • Kidney calculi    • Obesity, Class III, BMI 40-49.9 (morbid obesity) (HCC) 2020       Past surgical history:  Past Surgical History:   Procedure Laterality Date   • CYSTOSCOPY BLADDER STONE LITHOTRIPSY     • CYSTOSCOPY, URETEROSCOPY, RETROGRADE PYELOGRAM, STENT INSERTION Right 2020    Procedure: CYSTOSCOPY WITH RIGHT URETERAL STENT PLACEMENT;  Surgeon: Delano Hester MD;  Location: [facility];  Service: Urology;  Laterality: Right;   • WISDOM TOOTH EXTRACTION Bilateral     top and bottom       Allergies:  Patient has no known allergies.    Home medications:  Medications Prior to Admission   Medication Sig Dispense Refill Last Dose   • acetaZOLAMIDE (DIAMOX) 500 MG capsule Take 500 mg by mouth Daily.   2021 at Unknown time   • estradiol valerate (DELESTROGEN) 20 MG/ML injection Inject 0.2 mL into the appropriate muscle as directed by prescriber 2 (Two) Times a Week. 5 mL 3 Past Week at Unknown time   • HYDROcodone-acetaminophen (NORCO) 7.5-325 MG per tablet Take 1-2 tablets by mouth Every 6 (Six) Hours As Needed for Moderate Pain . 20 tablet 0 2021 at Unknown time   • ondansetron (ZOFRAN) 4 MG tablet Take 1 tablet by mouth Every 8 (Eight) Hours As Needed for Nausea or Vomiting. 20 tablet 0 2021 at Unknown time   • ciprofloxacin-dexamethasone (Ciprodex) 0.3-0.1 % otic suspension Administer 4 drops into both ears 2 (Two)  Times a Day. 7.5 mL 0    • HYDROcodone-acetaminophen (NORCO) 5-325 MG per tablet Take 1 tablet by mouth Every 6 (Six) Hours As Needed for Moderate Pain . 12 tablet 0 2021 at 1700   • progesterone (PROMETRIUM) 100 MG capsule Take 1 capsule by mouth Daily. 90 capsule 3 More than a month at Unknown time   • sertraline (ZOLOFT) 50 MG tablet Take 1 tablet by mouth Daily for 180 days. 90 tablet 1    • tamsulosin (FLOMAX) 0.4 MG capsule 24 hr capsule Take 1 capsule by mouth Daily. 30 capsule 0         Hospital medications:  sodium chloride, 10 mL, Intravenous, Q12H         •  acetaminophen  •  ketorolac  •  melatonin  •  Morphine  •  ondansetron  •  [COMPLETED] Insert peripheral IV **AND** sodium chloride  •  sodium chloride    Family history:  Family History   Problem Relation Age of Onset   • Hypertension Maternal Grandmother    • Diabetes type II Paternal Grandfather        Social history:  Social History     Tobacco Use   • Smoking status: Never Smoker   • Smokeless tobacco: Never Used   Substance Use Topics   • Alcohol use: Yes     Comment: 1 a month   • Drug use: Not Currently     Types: Marijuana     Comment: rarely       Review of systems:      Positive for:  Left flan pain  Negative for:  fever    Objective:  TMax 24 hours:   Temp (24hrs), Av.9 °F (36.6 °C), Min:97.4 °F (36.3 °C), Max:98.2 °F (36.8 °C)      Vitals Ranges:   Temp:  [97.4 °F (36.3 °C)-98.2 °F (36.8 °C)] 97.7 °F (36.5 °C)  Heart Rate:  [] 84  Resp:  [16-20] 16  BP: (115-150)/(69-87) 115/69    Intake/Output Last 3 shifts:  No intake/output data recorded.     Physical Exam:    General Appearance:    Alert, cooperative, NAD   HEENT:    No trauma, pupils reactive, hearing intact   Back:     No CVA tenderness   Lungs:     Respirations unlabored, no wheezing    Heart:    RRR, intact peripheral pulses   Abdomen:     Soft, NDNT, no masses, no guarding   :    Testes descended bilaterally, no nodules.  Penis normal.  No scrotal or penile  rashes noted   Extremities:   No edema, no deformity   Lymphatic:   No neck or groin LAD   Skin:   No bleeding, bruising or rashes   Neuro/Psych:   Orientation intact, mood/affect pleasant, no focal findings       Results review:   I reviewed the patient's new clinical results.    Data review:  Lab Results (last 24 hours)     Procedure Component Value Units Date/Time    Basic Metabolic Panel [[id number]]  (Abnormal) Collected: 11/06/21 0548    Specimen: Blood Updated: 11/06/21 0659     Glucose 99 mg/dL      BUN 11 mg/dL      Creatinine 0.48 mg/dL      Sodium 141 mmol/L      Potassium 3.6 mmol/L      Chloride 108 mmol/L      CO2 22.0 mmol/L      Calcium 8.2 mg/dL      eGFR Non African Amer >150 mL/min/1.73      BUN/Creatinine Ratio 22.9     Anion Gap 11.0 mmol/L     Narrative:      GFR Normal >60  Chronic Kidney Disease <60  Kidney Failure <15      CBC Auto Differential [[id number]]  (Abnormal) Collected: 11/06/21 0548    Specimen: Blood Updated: 11/06/21 0626     WBC 10.20 10*3/mm3      RBC 4.25 10*6/mm3      Hemoglobin 12.0 g/dL      Hematocrit 35.7 %      MCV 84.0 fL      MCH 28.2 pg      MCHC 33.6 g/dL      RDW 14.1 %      RDW-SD 42.0 fl      MPV 6.7 fL      Platelets 436 10*3/mm3      Neutrophil % 64.5 %      Lymphocyte % 26.2 %      Monocyte % 7.6 %      Eosinophil % 1.0 %      Basophil % 0.7 %      Neutrophils, Absolute 6.60 10*3/mm3      Lymphocytes, Absolute 2.70 10*3/mm3      Monocytes, Absolute 0.80 10*3/mm3      Eosinophils, Absolute 0.10 10*3/mm3      Basophils, Absolute 0.10 10*3/mm3      nRBC 0.2 /100 WBC     COVID PRE-OP / PRE-PROCEDURE SCREENING ORDER (NO ISOLATION) - Swab, Nasopharynx [[id number]]  (Normal) Collected: 11/05/21 2017    Specimen: Swab from Nasopharynx Updated: 11/05/21 2044    Narrative:      The following orders were created for panel order COVID PRE-OP / PRE-PROCEDURE SCREENING ORDER (NO ISOLATION) - Swab, Nasopharynx.  Procedure                               Abnormality         Status                      ---------                               -----------         ------                     COVID-19,CEPHEID/BELTRAN/BD...[[id number]]  Normal              Final result                 Please view results for these tests on the individual orders.    COVID-19,CEPHEID/BELTRAN/BDMAX,COR/WING/PAD/NASIM IN-HOUSE(OR EMERGENT/ADD-ON),NP SWAB IN TRANSPORT MEDIA 3-4 HR TAT, RT-PCR - Swab, Nasopharynx [[id number]]  (Normal) Collected: 11/05/21 2017    Specimen: Swab from Nasopharynx Updated: 11/05/21 2044     COVID19 Not Detected    Narrative:      Fact sheet for providers: https://www.fda.gov/media/581953/download     Fact sheet for patients: https://www.fda.gov/media/189119/download  Fact sheet for providers: https://www.fda.gov/media/758216/download    Fact sheet for patients: https://www.fda.gov/media/761962/download    Test performed by PCR.    Urinalysis, Microscopic Only - Urine, Clean Catch [[id number]]  (Abnormal) Collected: 11/05/21 1857    Specimen: Urine, Clean Catch Updated: 11/05/21 1957     RBC, UA Too Numerous to Count /HPF      WBC, UA 6-12 /HPF      Bacteria, UA None Seen /HPF      Squamous Epithelial Cells, UA 0-2 /HPF      Transitional Epithelial Cells, UA 0-2 /HPF      Yeast, UA Small/1+ Budding Yeast /HPF      Hyaline Casts, UA 3-6 /LPF      Calcium Oxalate Crystals, UA Small/1+ /HPF      Mucus, UA Moderate/2+ /HPF      Methodology Manual Light Microscopy    Urine Culture - Urine, Urine, Clean Catch [[id number]] Collected: 11/05/21 1857    Specimen: Urine, Clean Catch Updated: 11/05/21 1957    Urinalysis With Culture If Indicated - Urine, Clean Catch [[id number]]  (Abnormal) Collected: 11/05/21 1857    Specimen: Urine, Clean Catch Updated: 11/05/21 1939     Color, UA Dark Yellow     Comment: Result checked         Appearance, UA Cloudy     Comment: Result checked         pH, UA 5.5     Specific Gravity, UA 1.029     Glucose, UA Negative     Ketones, UA 15 mg/dL (1+)     Bilirubin, UA Negative     Blood,  UA Large (3+)     Protein, UA 30 mg/dL (1+)     Leuk Esterase, UA Negative     Nitrite, UA Negative     Urobilinogen, UA 0.2 E.U./dL    Comprehensive Metabolic Panel [[id number]]  (Abnormal) Collected: 11/05/21 1824    Specimen: Blood Updated: 11/05/21 1911     Glucose 106 mg/dL      BUN 14 mg/dL      Creatinine 0.83 mg/dL      Sodium 138 mmol/L      Potassium 3.8 mmol/L      Comment: Slight hemolysis detected by analyzer. Results may be affected.        Chloride 102 mmol/L      CO2 19.0 mmol/L      Calcium 8.9 mg/dL      Total Protein 7.7 g/dL      Albumin 4.20 g/dL      ALT (SGPT) 36 U/L      AST (SGOT) 35 U/L      Comment: Slight hemolysis detected by analyzer. Results may be affected.        Alkaline Phosphatase 71 U/L      Total Bilirubin 0.5 mg/dL      eGFR Non African Amer 112 mL/min/1.73      Globulin 3.5 gm/dL      A/G Ratio 1.2 g/dL      BUN/Creatinine Ratio 16.9     Anion Gap 17.0 mmol/L     Narrative:      GFR Normal >60  Chronic Kidney Disease <60  Kidney Failure <15      Lipase [[id number]]  (Normal) Collected: 11/05/21 1824    Specimen: Blood Updated: 11/05/21 1910     Lipase 17 U/L     CBC & Differential [[id number]]  (Abnormal) Collected: 11/05/21 1824    Specimen: Blood Updated: 11/05/21 1836    Narrative:      The following orders were created for panel order CBC & Differential.  Procedure                               Abnormality         Status                     ---------                               -----------         ------                     CBC Auto Differential[[id number]]        Abnormal            Final result                 Please view results for these tests on the individual orders.    CBC Auto Differential [[id number]]  (Abnormal) Collected: 11/05/21 1824    Specimen: Blood Updated: 11/05/21 1836     WBC 15.60 10*3/mm3      RBC 4.63 10*6/mm3      Hemoglobin 13.2 g/dL      Hematocrit 39.3 %      MCV 84.9 fL      MCH 28.4 pg      MCHC 33.5 g/dL      RDW 14.2 %      RDW-SD 42.4 fl       MPV 6.5 fL      Platelets 438 10*3/mm3      Neutrophil % 87.4 %      Lymphocyte % 7.6 %      Monocyte % 4.7 %      Eosinophil % 0.0 %      Basophil % 0.3 %      Neutrophils, Absolute 13.60 10*3/mm3      Lymphocytes, Absolute 1.20 10*3/mm3      Monocytes, Absolute 0.70 10*3/mm3      Eosinophils, Absolute 0.00 10*3/mm3      Basophils, Absolute 0.00 10*3/mm3      nRBC 0.1 /100 WBC            Imaging:  Imaging Results (Last 24 Hours)     Procedure Component Value Units Date/Time    XR Abdomen KUB [[id number]] Collected: 11/05/21 1906     Updated: 11/05/21 1912    Narrative:      DATE OF EXAM:  11/5/2021 6:50 PM     PROCEDURE:  XR ABDOMEN KUB-     INDICATIONS:  kidney stone left 26-year-old male with left flank pain and left-sided  renal stone     COMPARISON:  CT of the abdomen and pelvis without contrast dated 11/04/2021      TECHNIQUE:   Single radiographic view of the abdomen was obtained.        FINDINGS:  corresponding to the known 3 mm left ureteral stone is a calcification  to the left of the L4 transverse process. This has progressed distally  approximately 4 cm. No other renal or ureteral stones are demonstrated.  The bowel gas pattern is unremarkable. Visualized bony structures are  normal for patient's age.       Impression:      There has been about 4 cm distal progression of the known 3 mm left  ureteral stone which is now in the mid left ureter 1 compared with the  CT from one day ago.     Electronically Signed By-Fredy Leung DO On:11/5/2021 7:10 PM  This report was finalized on [id number] by  Fredy Leung DO.             Assessment:       Ureterolithiasis    Left obstructing stone  Right Renal stone    Plan:     Left stent  NPO    Jayden Cox MD  11/06/21  07:01 EDT

## 2021-11-06 NOTE — DISCHARGE SUMMARY
"[facility]    Nabil Jason MD    CHIEF COMPLAINT:     Flank and back pain    HISTORY OF PRESENT ILLNESS:    Obtained from ED provider HPI on 11/5/2021:  Patient is a 26-year-old male diagnosed with 3 mm kidney stone yesterday was offered admission by previous provider but decided to go home.  Sent home with [facility].  Patient reports he has continued to vomit \"a lot\" and unable to keep any medication or fluids down.  Pain continues to be dull, constant, nothing makes it better or worse.    11/6/2021: Patient confirms HPI noted above including recent left back and flank pain which had continued despite symptomatic treatment and expected passage of stone.  Pain was adequately controlled throughout admission and patient reports feeling significantly better post procedure.        Past Medical History:   Diagnosis Date   • Constipation due to opioid therapy 6/7/2016   • Depression with anxiety 1/8/2020   • Gastroesophageal reflux disease without esophagitis 5/21/2020   • Gender dysphoria    • Hormonal imbalance in transgender patient 1/8/2020   • Kidney calculi    • Obesity, Class III, BMI 40-49.9 (morbid obesity) ([facility]) 8/4/2020     Past Surgical History:   Procedure Laterality Date   • CYSTOSCOPY BLADDER STONE LITHOTRIPSY     • CYSTOSCOPY, URETEROSCOPY, RETROGRADE PYELOGRAM, STENT INSERTION Right 8/4/2020    Procedure: CYSTOSCOPY WITH RIGHT URETERAL STENT PLACEMENT;  Surgeon: Delano Hester MD;  Location: [facility];  Service: Urology;  Laterality: Right;   • WISDOM TOOTH EXTRACTION Bilateral     top and bottom     Family History   Problem Relation Age of Onset   • Hypertension Maternal Grandmother    • Diabetes type II Paternal Grandfather      Social History     Tobacco Use   • Smoking status: Never Smoker   • Smokeless tobacco: Never Used   Substance Use Topics   • Alcohol use: Yes     Comment: 1 a month   • Drug use: Not Currently     Types: Marijuana     Comment: rarely "     Medications Prior to Admission   Medication Sig Dispense Refill Last Dose   • acetaZOLAMIDE (DIAMOX) 500 MG capsule Take 500 mg by mouth Daily.   11/4/2021 at Unknown time   • estradiol valerate (DELESTROGEN) 20 MG/ML injection Inject 0.2 mL into the appropriate muscle as directed by prescriber 2 (Two) Times a Week. 5 mL 3 Past Week at Unknown time   • HYDROcodone-acetaminophen (NORCO) 7.5-325 MG per tablet Take 1-2 tablets by mouth Every 6 (Six) Hours As Needed for Moderate Pain . 20 tablet 0 11/5/2021 at Unknown time   • ondansetron (ZOFRAN) 4 MG tablet Take 1 tablet by mouth Every 8 (Eight) Hours As Needed for Nausea or Vomiting. 20 tablet 0 11/5/2021 at Unknown time   • ciprofloxacin-dexamethasone (Ciprodex) 0.3-0.1 % otic suspension Administer 4 drops into both ears 2 (Two) Times a Day. 7.5 mL 0    • HYDROcodone-acetaminophen (NORCO) 5-325 MG per tablet Take 1 tablet by mouth Every 6 (Six) Hours As Needed for Moderate Pain . 12 tablet 0 11/5/2021 at 1700   • progesterone (PROMETRIUM) 100 MG capsule Take 1 capsule by mouth Daily. 90 capsule 3 More than a month at Unknown time   • sertraline (ZOLOFT) 50 MG tablet Take 1 tablet by mouth Daily for 180 days. 90 tablet 1    • tamsulosin (FLOMAX) 0.4 MG capsule 24 hr capsule Take 1 capsule by mouth Daily. 30 capsule 0      Allergies:  Patient has no known allergies.    Immunization History   Administered Date(s) Administered   • DTP / HiB 08/05/1996   • DTaP, Unspecified 09/22/1999   • FluLaval/Fluarix/Fluzone >6 12/11/2018, 01/08/2020   • Hep B, Unspecified 05/06/1996   • MMR 08/05/1996, 09/22/1999   • Meningococcal MCV4P (Menactra) 06/25/2013   • OPV 09/22/1999   • Tdap 04/05/2006   • Varicella 06/25/2013           REVIEW OF SYSTEMS:    Review of Systems   Constitutional: Negative.   HENT: Negative.    Eyes: Negative.    Cardiovascular: Negative.    Respiratory: Negative.    Endocrine: Negative.    Skin: Negative.    Gastrointestinal: Negative.    Genitourinary:  Positive for dysuria, flank pain and hematuria.   Neurological: Negative.           Vital Signs  Temp:  [97 °F (36.1 °C)-98.6 °F (37 °C)] 98.1 °F (36.7 °C)  Heart Rate:  [] 84  Resp:  [16-20] 18  BP: (110-150)/(65-88) 133/85          Physical Exam:  Physical Exam  Vitals reviewed.   Constitutional:       General: He is not in acute distress.     Appearance: Normal appearance. He is obese. He is not ill-appearing, toxic-appearing or diaphoretic.   HENT:      Head: Normocephalic and atraumatic.      Right Ear: External ear normal.      Left Ear: External ear normal.      Nose: Nose normal.      Mouth/Throat:      Mouth: Mucous membranes are moist.   Eyes:      Extraocular Movements: Extraocular movements intact.   Cardiovascular:      Rate and Rhythm: Normal rate and regular rhythm.      Pulses: Normal pulses.      Heart sounds: Normal heart sounds.   Pulmonary:      Effort: Pulmonary effort is normal.      Breath sounds: Normal breath sounds.   Abdominal:      General: Bowel sounds are normal. There is no distension.      Palpations: Abdomen is soft.      Tenderness: There is no abdominal tenderness. There is no right CVA tenderness or left CVA tenderness.   Musculoskeletal:         General: Normal range of motion.      Cervical back: Normal range of motion.   Skin:     General: Skin is dry.      Capillary Refill: Capillary refill takes less than 2 seconds.   Neurological:      General: No focal deficit present.      Mental Status: He is alert and oriented to person, place, and time.   Psychiatric:         Mood and Affect: Mood normal.         Behavior: Behavior normal.         Thought Content: Thought content normal.            Emotional Behavior:   Normal   Debilities:  None  Results Review:    I reviewed the patient's new clinical results.  Lab Results (most recent)     Procedure Component Value Units Date/Time    STONE ANALYSIS - Calculus, Ureter, Left [[id number]] Collected: 11/06/21 0804    Specimen:  Calculus from Ureter, Left Updated: 11/06/21 0837    Basic Metabolic Panel [[id number]]  (Abnormal) Collected: 11/06/21 0548    Specimen: Blood Updated: 11/06/21 0659     Glucose 99 mg/dL      BUN 11 mg/dL      Creatinine 0.48 mg/dL      Sodium 141 mmol/L      Potassium 3.6 mmol/L      Chloride 108 mmol/L      CO2 22.0 mmol/L      Calcium 8.2 mg/dL      eGFR Non African Amer >150 mL/min/1.73      BUN/Creatinine Ratio 22.9     Anion Gap 11.0 mmol/L     Narrative:      GFR Normal >60  Chronic Kidney Disease <60  Kidney Failure <15      CBC Auto Differential [[id number]]  (Abnormal) Collected: 11/06/21 0548    Specimen: Blood Updated: 11/06/21 0626     WBC 10.20 10*3/mm3      RBC 4.25 10*6/mm3      Hemoglobin 12.0 g/dL      Hematocrit 35.7 %      MCV 84.0 fL      MCH 28.2 pg      MCHC 33.6 g/dL      RDW 14.1 %      RDW-SD 42.0 fl      MPV 6.7 fL      Platelets 436 10*3/mm3      Neutrophil % 64.5 %      Lymphocyte % 26.2 %      Monocyte % 7.6 %      Eosinophil % 1.0 %      Basophil % 0.7 %      Neutrophils, Absolute 6.60 10*3/mm3      Lymphocytes, Absolute 2.70 10*3/mm3      Monocytes, Absolute 0.80 10*3/mm3      Eosinophils, Absolute 0.10 10*3/mm3      Basophils, Absolute 0.10 10*3/mm3      nRBC 0.2 /100 WBC     COVID PRE-OP / PRE-PROCEDURE SCREENING ORDER (NO ISOLATION) - Swab, Nasopharynx [[id number]]  (Normal) Collected: 11/05/21 2017    Specimen: Swab from Nasopharynx Updated: 11/05/21 2044    Narrative:      The following orders were created for panel order COVID PRE-OP / PRE-PROCEDURE SCREENING ORDER (NO ISOLATION) - Swab, Nasopharynx.  Procedure                               Abnormality         Status                     ---------                               -----------         ------                     COVID-19,CEPHEID/BELTRAN/BD...[[id number]]  Normal              Final result                 Please view results for these tests on the individual orders.    COVID-19,CEPHEID/BELTRAN/BDMAX,COR/WING/PAD/NASIM IN-HOUSE(OR  EMERGENT/ADD-ON),NP SWAB IN TRANSPORT MEDIA 3-4 HR TAT, RT-PCR - Swab, Nasopharynx [[id number]]  (Normal) Collected: 11/05/21 2017    Specimen: Swab from Nasopharynx Updated: 11/05/21 2044     COVID19 Not Detected    Narrative:      Fact sheet for providers: https://www.fda.gov/media/870248/download     Fact sheet for patients: https://www.fda.gov/media/340602/download  Fact sheet for providers: https://www.fda.gov/media/577809/download    Fact sheet for patients: https://www.fda.gov/media/167202/download    Test performed by PCR.    Urinalysis, Microscopic Only - Urine, Clean Catch [[id number]]  (Abnormal) Collected: 11/05/21 1857    Specimen: Urine, Clean Catch Updated: 11/05/21 1957     RBC, UA Too Numerous to Count /HPF      WBC, UA 6-12 /HPF      Bacteria, UA None Seen /HPF      Squamous Epithelial Cells, UA 0-2 /HPF      Transitional Epithelial Cells, UA 0-2 /HPF      Yeast, UA Small/1+ Budding Yeast /HPF      Hyaline Casts, UA 3-6 /LPF      Calcium Oxalate Crystals, UA Small/1+ /HPF      Mucus, UA Moderate/2+ /HPF      Methodology Manual Light Microscopy    Urine Culture - Urine, Urine, Clean Catch [[id number]] Collected: 11/05/21 1857    Specimen: Urine, Clean Catch Updated: 11/05/21 1957    Urinalysis With Culture If Indicated - Urine, Clean Catch [[id number]]  (Abnormal) Collected: 11/05/21 1857    Specimen: Urine, Clean Catch Updated: 11/05/21 1939     Color, UA Dark Yellow     Comment: Result checked         Appearance, UA Cloudy     Comment: Result checked         pH, UA 5.5     Specific Gravity, UA 1.029     Glucose, UA Negative     Ketones, UA 15 mg/dL (1+)     Bilirubin, UA Negative     Blood, UA Large (3+)     Protein, UA 30 mg/dL (1+)     Leuk Esterase, UA Negative     Nitrite, UA Negative     Urobilinogen, UA 0.2 E.U./dL    Comprehensive Metabolic Panel [[id number]]  (Abnormal) Collected: 11/05/21 1824    Specimen: Blood Updated: 11/05/21 1911     Glucose 106 mg/dL      BUN 14 mg/dL      Creatinine  0.83 mg/dL      Sodium 138 mmol/L      Potassium 3.8 mmol/L      Comment: Slight hemolysis detected by analyzer. Results may be affected.        Chloride 102 mmol/L      CO2 19.0 mmol/L      Calcium 8.9 mg/dL      Total Protein 7.7 g/dL      Albumin 4.20 g/dL      ALT (SGPT) 36 U/L      AST (SGOT) 35 U/L      Comment: Slight hemolysis detected by analyzer. Results may be affected.        Alkaline Phosphatase 71 U/L      Total Bilirubin 0.5 mg/dL      eGFR Non African Amer 112 mL/min/1.73      Globulin 3.5 gm/dL      A/G Ratio 1.2 g/dL      BUN/Creatinine Ratio 16.9     Anion Gap 17.0 mmol/L     Narrative:      GFR Normal >60  Chronic Kidney Disease <60  Kidney Failure <15      Lipase [[id number]]  (Normal) Collected: 11/05/21 1824    Specimen: Blood Updated: 11/05/21 1910     Lipase 17 U/L     CBC & Differential [[id number]]  (Abnormal) Collected: 11/05/21 1824    Specimen: Blood Updated: 11/05/21 1836    Narrative:      The following orders were created for panel order CBC & Differential.  Procedure                               Abnormality         Status                     ---------                               -----------         ------                     CBC Auto Differential[[id number]]        Abnormal            Final result                 Please view results for these tests on the individual orders.    CBC Auto Differential [[id number]]  (Abnormal) Collected: 11/05/21 1824    Specimen: Blood Updated: 11/05/21 1836     WBC 15.60 10*3/mm3      RBC 4.63 10*6/mm3      Hemoglobin 13.2 g/dL      Hematocrit 39.3 %      MCV 84.9 fL      MCH 28.4 pg      MCHC 33.5 g/dL      RDW 14.2 %      RDW-SD 42.4 fl      MPV 6.5 fL      Platelets 438 10*3/mm3      Neutrophil % 87.4 %      Lymphocyte % 7.6 %      Monocyte % 4.7 %      Eosinophil % 0.0 %      Basophil % 0.3 %      Neutrophils, Absolute 13.60 10*3/mm3      Lymphocytes, Absolute 1.20 10*3/mm3      Monocytes, Absolute 0.70 10*3/mm3      Eosinophils, Absolute 0.00  10*3/mm3      Basophils, Absolute 0.00 10*3/mm3      nRBC 0.1 /100 WBC           Imaging Results (Most Recent)     Procedure Component Value Units Date/Time    XR Abdomen KUB [[id number]] Collected: 11/05/21 1906     Updated: 11/05/21 1912    Narrative:      DATE OF EXAM:  11/5/2021 6:50 PM     PROCEDURE:  XR ABDOMEN KUB-     INDICATIONS:  kidney stone left 26-year-old male with left flank pain and left-sided  renal stone     COMPARISON:  CT of the abdomen and pelvis without contrast dated 11/04/2021      TECHNIQUE:   Single radiographic view of the abdomen was obtained.        FINDINGS:  corresponding to the known 3 mm left ureteral stone is a calcification  to the left of the L4 transverse process. This has progressed distally  approximately 4 cm. No other renal or ureteral stones are demonstrated.  The bowel gas pattern is unremarkable. Visualized bony structures are  normal for patient's age.       Impression:      There has been about 4 cm distal progression of the known 3 mm left  ureteral stone which is now in the mid left ureter 1 compared with the  CT from one day ago.     Electronically Signed By-Fredy Leung DO On:11/5/2021 7:10 PM  This report was finalized on [id number] by  Fredy Leung DO.        reviewed    ECG/EMG Results (most recent)     None        not reviewed            Microbiology Results (last 10 days)     Procedure Component Value - Date/Time    COVID PRE-OP / PRE-PROCEDURE SCREENING ORDER (NO ISOLATION) - Swab, Nasopharynx [[id number]]  (Normal) Collected: 11/05/21 2017    Lab Status: Final result Specimen: Swab from Nasopharynx Updated: 11/05/21 2044    Narrative:      The following orders were created for panel order COVID PRE-OP / PRE-PROCEDURE SCREENING ORDER (NO ISOLATION) - Swab, Nasopharynx.  Procedure                               Abnormality         Status                     ---------                               -----------         ------                      COVID-19,CEPHEID/BELTRAN/BD...[[id number]]  Normal              Final result                 Please view results for these tests on the individual orders.    COVID-19,CEPHEID/BELTRAN/BDMAX,COR/WING/PAD/NASIM IN-HOUSE(OR EMERGENT/ADD-ON),NP SWAB IN TRANSPORT MEDIA 3-4 HR TAT, RT-PCR - Swab, Nasopharynx [[id number]]  (Normal) Collected: 11/05/21 2017    Lab Status: Final result Specimen: Swab from Nasopharynx Updated: 11/05/21 2044     COVID19 Not Detected    Narrative:      Fact sheet for providers: https://www.fda.gov/media/323127/download     Fact sheet for patients: https://www.fda.gov/media/877351/download  Fact sheet for providers: https://www.fda.gov/media/666150/download    Fact sheet for patients: https://www.[employer].gov/media/750421/download    Test performed by PCR.          Assessment/Plan     Ureterolithiasis       Ureterolithiasis  -Initial WBCs: 15.60 which trended to 10.20 on 11/6/2021  -Serum creatinine: 0.83  -UA shows 2+ mucus with 6-12 WBCs, RBCs too numerous to count, 1+ protein, 3+ blood and 1+ ketones with no bacteria noted  -KUB on 11/5/2021 shows about a 4 cm distal progression of the previously reported 3 mm left ureteral stone which is now noted in the mid left ureter  -Patient was given lidocaine as well as Toradol in the ED with as needed Toradol and morphine continued throughout his admission  -Urology was consulted who performed cystoscopy with left ureteroscopy and stone extraction as well as pyelogram and stent insertion on 11/6/2021 with no complications and minimal blood loss reported  -Keflex prescribed at discharge, patient confirms still having a supply of Norco which was prescribed in the ED recently    I discussed the patients findings and my recommendations with patient and nursing staff.     Discharge Diagnosis:      Ureterolithiasis      Hospital Course  Patient is a 26 y.o. male presented with continued abdominal and flank pain and HPI noted above.  Patient was initially noted to have a  leukocytosis of 15.60 which normalized on the morning of 11/6/2021.  UA did show 2+ mucus with 6-12 WBCs, RBCs too numerous to count, 1+ protein, 3+ blood and 1+ ketones with no bacteria noted.  Serum creatinine was within normal limits.  KUB showed progression of a previously reported 3 mm left ureteral stone which was currently located in the left ureter.  Analgesia with Toradol, lidocaine infusion and morphine throughout admission.  Urology was consulted who performed cystoscopy with left ureteroscopy, stone extraction, pyelogram and stent insertion successfully on 11/6/2021 with recommendations for Keflex and Norco at discharge.  Keflex has been prescribed and patient confirms having a remaining supply of Elizabeth from recent ER visit.  At this time patient is felt to be in good condition for discharge with close follow-up with urology and PCP on an outpatient basis.  All testing/results and plan along with concerning/alarm symptoms for which to call 911/return to the ED were discussed and patient verbalizes understanding and agreement.    Past Medical History:     Past Medical History:   Diagnosis Date   • Constipation due to opioid therapy 6/7/2016   • Depression with anxiety 1/8/2020   • Gastroesophageal reflux disease without esophagitis 5/21/2020   • Gender dysphoria    • Hormonal imbalance in transgender patient 1/8/2020   • Kidney calculi    • Obesity, Class III, BMI 40-49.9 (morbid obesity) ([facility]) 8/4/2020       Past Surgical History:     Past Surgical History:   Procedure Laterality Date   • CYSTOSCOPY BLADDER STONE LITHOTRIPSY     • CYSTOSCOPY, URETEROSCOPY, RETROGRADE PYELOGRAM, STENT INSERTION Right 8/4/2020    Procedure: CYSTOSCOPY WITH RIGHT URETERAL STENT PLACEMENT;  Surgeon: Delano Hester MD;  Location: [facility] MAIN OR;  Service: Urology;  Laterality: Right;   • WISDOM TOOTH EXTRACTION Bilateral     top and bottom       Social History:   Social History     Socioeconomic History   • Marital  status: Single   Tobacco Use   • Smoking status: Never Smoker   • Smokeless tobacco: Never Used   Substance and Sexual Activity   • Alcohol use: Yes     Comment: 1 a month   • Drug use: Not Currently     Types: Marijuana     Comment: rarely   • Sexual activity: Yes     Partners: Female     Birth control/protection: Condom       Procedures Performed    Procedure(s):  CYSTOSCOPY LEFT STENT INSERTION  -------------------       Consults:   Consults     Date and Time Order Name Status Description    11/5/2021  7:58 PM Urology (on-call MD unless specified)            Condition on Discharge:     Stable    Discharge Disposition      Discharge Medications     Discharge Medications      ASK your doctor about these medications      Instructions Start Date   acetaZOLAMIDE 500 MG capsule  Commonly known as: DIAMOX   500 mg, Oral, Daily      ciprofloxacin-dexamethasone 0.3-0.1 % otic suspension  Commonly known as: Ciprodex   4 drops, Both Ears, 2 Times Daily      estradiol valerate 20 MG/ML injection  Commonly known as: DELESTROGEN   4 mg, Intramuscular, 2 Times Weekly      HYDROcodone-acetaminophen 7.5-325 MG per tablet  Commonly known as: NORCO   1-2 tablets, Oral, Every 6 Hours PRN      HYDROcodone-acetaminophen 5-325 MG per tablet  Commonly known as: NORCO   1 tablet, Oral, Every 6 Hours PRN      ondansetron 4 MG tablet  Commonly known as: ZOFRAN   4 mg, Oral, Every 8 Hours PRN      progesterone 100 MG capsule  Commonly known as: PROMETRIUM   100 mg, Oral, Daily      sertraline 50 MG tablet  Commonly known as: ZOLOFT   50 mg, Oral, Daily      tamsulosin 0.4 MG capsule 24 hr capsule  Commonly known as: FLOMAX   0.4 mg, Oral, Daily             Discharge Diet:     Activity at Discharge:     Follow-up Appointments  No future appointments.      Test Results Pending at Discharge  Pending Labs     Order Current Status    STONE ANALYSIS - Calculus, Ureter, Left In process    Urine Culture - Urine, Urine, Clean Catch In process            Risk for Readmission (LACE) Score: 2 (11/6/2021  6:01 AM)          Juaquin Macias PA-C  11/06/21  10:24 EDT

## 2021-11-06 NOTE — OUTREACH NOTE
Prep Survey      Responses   [facility] patient discharged [facility]? [facility]   Is LACE score < 7 ? Yes   Emergency Room discharge w/ pulse ox? No   Eligibility [facility]   Date of Admission 11/05/21   Date of Discharge 11/06/21   Discharge Disposition Home or Self Care   Discharge diagnosis CYSTOSCOPY, LEFT URETEROSCOPY, RETROGRADE PYELOGRAM, LEFT STENT INSERTION   Does the patient have one of the following disease processes/diagnoses(primary or secondary)? Other   Does the patient have Home health ordered? No   Is there a DME ordered? No   Prep survey completed? Yes          Tara Duffy RN

## 2021-11-07 ENCOUNTER — HOSPITAL ENCOUNTER (EMERGENCY)
Facility: HOSPITAL | Age: 26
Discharge: HOME | End: 2021-11-08
Attending: EMERGENCY MEDICINE | Admitting: EMERGENCY MEDICINE

## 2021-11-07 DIAGNOSIS — R11.2 NON-INTRACTABLE VOMITING WITH NAUSEA, UNSPECIFIED VOMITING TYPE: ICD-10-CM

## 2021-11-07 DIAGNOSIS — K59.00 CONSTIPATION, UNSPECIFIED CONSTIPATION TYPE: Primary | ICD-10-CM

## 2021-11-07 PROCEDURE — 99284 EMERGENCY DEPT VISIT MOD MDM: CPT

## 2021-11-07 PROCEDURE — 96361 HYDRATE IV INFUSION ADD-ON: CPT

## 2021-11-07 PROCEDURE — 96374 THER/PROPH/DIAG INJ IV PUSH: CPT

## 2021-11-07 PROCEDURE — 36415 COLL VENOUS BLD VENIPUNCTURE: CPT

## 2021-11-07 PROCEDURE — 96375 TX/PRO/DX INJ NEW DRUG ADDON: CPT

## 2021-11-08 ENCOUNTER — TRANSITIONAL CARE MANAGEMENT TELEPHONE ENCOUNTER (OUTPATIENT)
Dept: CALL CENTER | Facility: HOSPITAL | Age: 26
End: 2021-11-08

## 2021-11-08 VITALS
RESPIRATION RATE: 15 BRPM | HEIGHT: 69 IN | OXYGEN SATURATION: 99 % | BODY MASS INDEX: 46.65 KG/M2 | HEART RATE: 75 BPM | TEMPERATURE: 97.5 F | WEIGHT: 315 LBS | DIASTOLIC BLOOD PRESSURE: 73 MMHG | SYSTOLIC BLOOD PRESSURE: 128 MMHG

## 2021-11-08 LAB
ANION GAP SERPL CALCULATED.3IONS-SCNC: 15 MMOL/L (ref 5–15)
BACTERIA UR QL AUTO: ABNORMAL /HPF
BASOPHILS # BLD AUTO: 0 10*3/MM3 (ref 0–0.2)
BASOPHILS NFR BLD AUTO: 0.2 % (ref 0–1.5)
BILIRUB UR QL STRIP: NEGATIVE
BUN SERPL-MCNC: 8 MG/DL (ref 6–20)
BUN/CREAT SERPL: 11.6 (ref 7–25)
CALCIUM SPEC-SCNC: 8.5 MG/DL (ref 8.6–10.5)
CHLORIDE SERPL-SCNC: 104 MMOL/L (ref 98–107)
CLARITY UR: ABNORMAL
CO2 SERPL-SCNC: 22 MMOL/L (ref 22–29)
COLOR UR: ABNORMAL
CREAT SERPL-MCNC: 0.69 MG/DL (ref 0.76–1.27)
DEPRECATED RDW RBC AUTO: 41.1 FL (ref 37–54)
EOSINOPHIL # BLD AUTO: 0 10*3/MM3 (ref 0–0.4)
EOSINOPHIL NFR BLD AUTO: 0.4 % (ref 0.3–6.2)
ERYTHROCYTE [DISTWIDTH] IN BLOOD BY AUTOMATED COUNT: 13.9 % (ref 12.3–15.4)
GFR SERPL CREATININE-BSD FRML MDRD: 139 ML/MIN/1.73
GLUCOSE SERPL-MCNC: 105 MG/DL (ref 65–99)
GLUCOSE UR STRIP-MCNC: NEGATIVE MG/DL
HCT VFR BLD AUTO: 37.5 % (ref 37.5–51)
HGB BLD-MCNC: 12.5 G/DL (ref 13–17.7)
HGB UR QL STRIP.AUTO: ABNORMAL
HOLD SPECIMEN: NORMAL
HYALINE CASTS UR QL AUTO: ABNORMAL /LPF
KETONES UR QL STRIP: ABNORMAL
LEUKOCYTE ESTERASE UR QL STRIP.AUTO: ABNORMAL
LYMPHOCYTES # BLD AUTO: 1.6 10*3/MM3 (ref 0.7–3.1)
LYMPHOCYTES NFR BLD AUTO: 15 % (ref 19.6–45.3)
MCH RBC QN AUTO: 27.8 PG (ref 26.6–33)
MCHC RBC AUTO-ENTMCNC: 33.4 G/DL (ref 31.5–35.7)
MCV RBC AUTO: 83 FL (ref 79–97)
MONOCYTES # BLD AUTO: 0.7 10*3/MM3 (ref 0.1–0.9)
MONOCYTES NFR BLD AUTO: 6.4 % (ref 5–12)
NEUTROPHILS NFR BLD AUTO: 78 % (ref 42.7–76)
NEUTROPHILS NFR BLD AUTO: 8.6 10*3/MM3 (ref 1.7–7)
NITRITE UR QL STRIP: NEGATIVE
NRBC BLD AUTO-RTO: 0.1 /100 WBC (ref 0–0.2)
PH UR STRIP.AUTO: 6.5 [PH] (ref 5–8)
PLATELET # BLD AUTO: 462 10*3/MM3 (ref 140–450)
PMV BLD AUTO: 6.5 FL (ref 6–12)
POTASSIUM SERPL-SCNC: 3.4 MMOL/L (ref 3.5–5.2)
PROT UR QL STRIP: ABNORMAL
RBC # BLD AUTO: 4.52 10*6/MM3 (ref 4.14–5.8)
RBC # UR: ABNORMAL /HPF
REF LAB TEST METHOD: ABNORMAL
SODIUM SERPL-SCNC: 141 MMOL/L (ref 136–145)
SP GR UR STRIP: 1.01 (ref 1–1.03)
SQUAMOUS #/AREA URNS HPF: ABNORMAL /HPF
UROBILINOGEN UR QL STRIP: ABNORMAL
WBC # BLD AUTO: 11 10*3/MM3 (ref 3.4–10.8)
WBC UR QL AUTO: ABNORMAL /HPF

## 2021-11-08 PROCEDURE — 96374 THER/PROPH/DIAG INJ IV PUSH: CPT

## 2021-11-08 PROCEDURE — 96361 HYDRATE IV INFUSION ADD-ON: CPT

## 2021-11-08 PROCEDURE — 80048 BASIC METABOLIC PNL TOTAL CA: CPT | Performed by: EMERGENCY MEDICINE

## 2021-11-08 PROCEDURE — 96375 TX/PRO/DX INJ NEW DRUG ADDON: CPT

## 2021-11-08 PROCEDURE — 87086 URINE CULTURE/COLONY COUNT: CPT | Performed by: EMERGENCY MEDICINE

## 2021-11-08 PROCEDURE — 81001 URINALYSIS AUTO W/SCOPE: CPT | Performed by: EMERGENCY MEDICINE

## 2021-11-08 PROCEDURE — 25010000002 METOCLOPRAMIDE PER 10 MG: Performed by: EMERGENCY MEDICINE

## 2021-11-08 PROCEDURE — 25010000002 ONDANSETRON PER 1 MG: Performed by: EMERGENCY MEDICINE

## 2021-11-08 PROCEDURE — 85025 COMPLETE CBC W/AUTO DIFF WBC: CPT | Performed by: EMERGENCY MEDICINE

## 2021-11-08 RX ORDER — ONDANSETRON 2 MG/ML
4 INJECTION INTRAMUSCULAR; INTRAVENOUS ONCE
Status: COMPLETED | OUTPATIENT
Start: 2021-11-08 | End: 2021-11-08

## 2021-11-08 RX ORDER — METOCLOPRAMIDE HYDROCHLORIDE 5 MG/ML
10 INJECTION INTRAMUSCULAR; INTRAVENOUS ONCE
Status: COMPLETED | OUTPATIENT
Start: 2021-11-08 | End: 2021-11-08

## 2021-11-08 RX ADMIN — SODIUM CHLORIDE 1000 ML: 9 INJECTION, SOLUTION INTRAVENOUS at 02:42

## 2021-11-08 RX ADMIN — ONDANSETRON 4 MG: 2 INJECTION INTRAMUSCULAR; INTRAVENOUS at 00:40

## 2021-11-08 RX ADMIN — METOCLOPRAMIDE HYDROCHLORIDE 10 MG: 5 INJECTION INTRAMUSCULAR; INTRAVENOUS at 02:42

## 2021-11-08 RX ADMIN — SODIUM CHLORIDE 1000 ML: 0.9 INJECTION, SOLUTION INTRAVENOUS at 00:40

## 2021-11-08 NOTE — ED PROVIDER NOTES
Subjective   27 yo male status post left ureteral stent insertion on 11/5/2021 at this facility for 3 mm left ureteral stone.  Patient has developed constipation and lower abdominal cramping with some nausea and vomiting for the past 1 to 2 days.  Patient has had similar constipation in the past but has had to take narcotic pain medicine for kidney stones.  No fever.  Constipation worse with narcotic pain medicine.          Review of Systems   Gastrointestinal: Positive for abdominal pain, constipation, nausea and vomiting.   All other systems reviewed and are negative.      Past Medical History:   Diagnosis Date   • Constipation due to opioid therapy 6/7/2016   • Depression with anxiety 1/8/2020   • Gastroesophageal reflux disease without esophagitis 5/21/2020   • Gender dysphoria    • Hormonal imbalance in transgender patient 1/8/2020   • Kidney calculi    • Obesity, Class III, BMI 40-49.9 (morbid obesity) ([facility]) 8/4/2020       No Known Allergies    Past Surgical History:   Procedure Laterality Date   • CYSTOSCOPY BLADDER STONE LITHOTRIPSY     • CYSTOSCOPY, URETEROSCOPY, RETROGRADE PYELOGRAM, STENT INSERTION Right 8/4/2020    Procedure: CYSTOSCOPY WITH RIGHT URETERAL STENT PLACEMENT;  Surgeon: Delano Hester MD;  Location: [facility];  Service: Urology;  Laterality: Right;   • WISDOM TOOTH EXTRACTION Bilateral     top and bottom       Family History   Problem Relation Age of Onset   • Hypertension Maternal Grandmother    • Diabetes type II Paternal Grandfather        Social History     Socioeconomic History   • Marital status: Single   Tobacco Use   • Smoking status: Never Smoker   • Smokeless tobacco: Never Used   Substance and Sexual Activity   • Alcohol use: Yes     Comment: 1 a month   • Drug use: Not Currently     Types: Marijuana     Comment: rarely   • Sexual activity: Yes     Partners: Female     Birth control/protection: Condom           Objective   Physical Exam  Constitutional:       Appearance:  Normal appearance.   HENT:      Head: Normocephalic and atraumatic.      Mouth/Throat:      Mouth: Mucous membranes are moist.      Pharynx: Oropharynx is clear.   Eyes:      Conjunctiva/sclera: Conjunctivae normal.      Pupils: Pupils are equal, round, and reactive to light.   Cardiovascular:      Rate and Rhythm: Normal rate and regular rhythm.      Heart sounds: Normal heart sounds.   Pulmonary:      Effort: Pulmonary effort is normal.      Breath sounds: Normal breath sounds.   Abdominal:      General: Bowel sounds are normal. There is no distension.      Palpations: Abdomen is soft.      Tenderness: There is no abdominal tenderness.   Musculoskeletal:         General: Normal range of motion.   Skin:     General: Skin is warm and dry.      Capillary Refill: Capillary refill takes less than 2 seconds.   Neurological:      General: No focal deficit present.      Mental Status: He is alert and oriented to person, place, and time.   Psychiatric:         Mood and Affect: Mood normal.         Behavior: Behavior normal.         Procedures           ED Course                                           MDM  Number of Diagnoses or Management Options  Constipation, unspecified constipation type  Non-intractable vomiting with nausea, unspecified vomiting type  Diagnosis management comments: Results for orders placed or performed during the hospital encounter of 11/07/21  -Basic Metabolic Panel:   Specimen: Arm, Right; Blood       Result                      Value             Ref Range           Glucose                     105 (H)           65 - 99 mg/dL       BUN                         8                 6 - 20 mg/dL        Creatinine                  0.69 (L)          0.76 - 1.27 *       Sodium                      141               136 - 145 mm*       Potassium                   3.4 (L)           3.5 - 5.2 mm*       Chloride                    104               98 - 107 mmo*       CO2                         22.0               22.0 - 29.0 *       Calcium                     8.5 (L)           8.6 - 10.5 m*       eGFR Non African Amer       139               >60 mL/min/1*       BUN/Creatinine Ratio        11.6              7.0 - 25.0          Anion Gap                   15.0              5.0 - 15.0 m*  -Urinalysis With Culture If Indicated - Urine, Clean Catch:   Specimen: Urine, Clean Catch       Result                      Value             Ref Range           Color, UA                   Red (A)           Yellow, Straw       Appearance, UA              Turbid (A)        Clear               pH, UA                      6.5               5.0 - 8.0           Specific [place], UA        1.012             1.005 - 1.030       Glucose, UA                 Negative          Negative            Ketones, UA                                   Negative        40 mg/dL (2+) (A)       Bilirubin, UA               Negative          Negative            Blood, UA                                     Negative        Large (3+) (A)       Protein, UA                                   Negative        100 mg/dL (2+) (A)       Leuk Esterase, UA                                                 Nitrite, UA                 Negative          Negative            Urobilinogen, UA            0.2 E.U./dL       0.2 - 1.0 E.*  -CBC Auto Differential:   Specimen: Arm, Right; Blood       Result                      Value             Ref Range           WBC                         11.00 (H)         3.40 - 10.80*       RBC                         4.52              4.14 - 5.80 *       Hemoglobin                  12.5 (L)          13.0 - 17.7 *       Hematocrit                  37.5              37.5 - 51.0 %       MCV                         83.0              79.0 - 97.0 *       MCH                         27.8              26.6 - 33.0 *       MCHC                        33.4              31.5 - 35.7 *       RDW                         13.9              12.3 - 15.4 %        RDW-SD                      41.1              37.0 - 54.0 *       MPV                         6.5               6.0 - 12.0 fL       Platelets                   462 (H)           140 - 450 10*       Neutrophil %                78.0 (H)          42.7 - 76.0 %       Lymphocyte %                15.0 (L)          19.6 - 45.3 %       Monocyte %                  6.4               5.0 - 12.0 %        Eosinophil %                0.4               0.3 - 6.2 %         Basophil %                  0.2               0.0 - 1.5 %         Neutrophils, Absolute       8.60 (H)          1.70 - 7.00 *       Lymphocytes, Absolute       1.60              0.70 - 3.10 *       Monocytes, Absolute         0.70              0.10 - 0.90 *       Eosinophils, Absolute       0.00              0.00 - 0.40 *       Basophils, Absolute         0.00              0.00 - 0.20 *       nRBC                        0.1               0.0 - 0.2 /1*  -Urinalysis, Microscopic Only - Urine, Clean Catch:   Specimen: Urine, Clean Catch       Result                      Value             Ref Range           RBC, UA                                       None Seen /H*   Too Numerous to Count (A)       WBC, UA                     13-20 (A)         None Seen /H*       Bacteria, UA                1+ (A)            None Seen /H*       Squamous Epithelial Ce*     3-6 (A)           None Seen, 0*       Hyaline Casts, UA           0-2               None Seen /L*       Methodology                                                   Manual Light Microscopy  -[facility] - [facility]:        Result                      Value             Ref Range           Extra Tube                                                    Hold for add-ons    Patient well, large bowel movement in the ED, pain resolved, nausea improved.  Patient tolerates p.o. food in ED well.  Patient on Keflex, advised to continue, return for any worsening of pain, vomiting or fever, otherwise to follow-up closely with his  urologist.  Patient has Zofran at home.       Amount and/or Complexity of Data Reviewed  Decide to obtain previous medical records or to obtain history from someone other than the patient: yes        Final diagnoses:   Constipation, unspecified constipation type   Non-intractable vomiting with nausea, unspecified vomiting type       ED Disposition  ED Disposition     ED Disposition Condition Comment    Discharge Stable             Follow-up closely with your urologist             Medication List      No changes were made to your prescriptions during this visit.          Brant Nesbitt MD  11/08/21 0447

## 2021-11-08 NOTE — OUTREACH NOTE
Call Center TCM Note      Responses   [facility] patient discharged from? [facility]   Does the patient have one of the following disease processes/diagnoses(primary or secondary)? Other   TCM attempt successful? No   Unsuccessful attempts Attempt 1   Call Status Left message          Zoey Carbone MA    11/8/2021, 11:23 EST

## 2021-11-08 NOTE — OUTREACH NOTE
Call Center TCM Note      Responses   [facility] patient discharged from? [facility]   Does the patient have one of the following disease processes/diagnoses(primary or secondary)? Other   TCM attempt successful? Yes   Discharge diagnosis CYSTOSCOPY, LEFT URETEROSCOPY, RETROGRADE PYELOGRAM, LEFT STENT INSERTION   Meds reviewed with patient/caregiver? Yes   Is the patient having any side effects they believe may be caused by any medication additions or changes? No   Does the patient have all medications ordered at discharge? Yes   Is the patient taking all medications as directed (includes completed medication regime)? Yes   Does the patient have a primary care provider?  Yes   Does the patient have an appointment with their PCP within 7 days of discharge? No   Comments regarding PCP Pt declines TCM FWP with PCP at this time, but pt did fwp with Urologist today    Has the patient kept scheduled appointments due by today? N/A   Has home health visited the patient within 72 hours of discharge? N/A   Psychosocial issues? No   Did the patient receive a copy of their discharge instructions? Yes   Nursing interventions Reviewed instructions with patient   What is the patient's perception of their health status since discharge? Improving   Is the patient/caregiver able to teach back signs and symptoms related to disease process for when to call PCP? Yes   Is the patient/caregiver able to teach back signs and symptoms related to disease process for when to call 911? Yes   Is the patient/caregiver able to teach back the hierarchy of who to call/visit for symptoms/problems? PCP, Specialist, Home health nurse, Urgent Care, ED, 911 Yes   If the patient is a current smoker, are they able to teach back resources for cessation? Not a smoker   TCM call completed? Yes   Wrap up additional comments Pt is doing fine, pain is much improved, all meds in place. Pt did fwp with Urologist today in ofc. Declines TCM FWP with PCP   Eren at this time          Zoey Carbone MA    11/8/2021, 16:47 EST

## 2021-11-09 ENCOUNTER — APPOINTMENT (OUTPATIENT)
Dept: CT IMAGING | Facility: HOSPITAL | Age: 26
End: 2021-11-09

## 2021-11-09 ENCOUNTER — HOSPITAL ENCOUNTER (OUTPATIENT)
Facility: HOSPITAL | Age: 26
Setting detail: OBSERVATION
Discharge: HOME | End: 2021-11-10
Attending: EMERGENCY MEDICINE | Admitting: EMERGENCY MEDICINE

## 2021-11-09 ENCOUNTER — APPOINTMENT (OUTPATIENT)
Dept: GENERAL RADIOLOGY | Facility: HOSPITAL | Age: 26
End: 2021-11-09

## 2021-11-09 DIAGNOSIS — R10.9 FLANK PAIN: ICD-10-CM

## 2021-11-09 DIAGNOSIS — R10.9 RIGHT FLANK PAIN: ICD-10-CM

## 2021-11-09 DIAGNOSIS — N20.1 URETEROLITHIASIS: ICD-10-CM

## 2021-11-09 DIAGNOSIS — R31.9 URINARY TRACT INFECTION WITH HEMATURIA, SITE UNSPECIFIED: Primary | ICD-10-CM

## 2021-11-09 DIAGNOSIS — R11.10 VOMITING, INTRACTABILITY OF VOMITING NOT SPECIFIED, PRESENCE OF NAUSEA NOT SPECIFIED, UNSPECIFIED VOMITING TYPE: ICD-10-CM

## 2021-11-09 DIAGNOSIS — N39.0 URINARY TRACT INFECTION WITH HEMATURIA, SITE UNSPECIFIED: Primary | ICD-10-CM

## 2021-11-09 DIAGNOSIS — E86.0 DEHYDRATION: ICD-10-CM

## 2021-11-09 LAB
ANION GAP SERPL CALCULATED.3IONS-SCNC: 18 MMOL/L (ref 5–15)
BACTERIA SPEC AEROBE CULT: NO GROWTH
BACTERIA UR QL AUTO: ABNORMAL /HPF
BASOPHILS # BLD AUTO: 0 10*3/MM3 (ref 0–0.2)
BASOPHILS NFR BLD AUTO: 0.3 % (ref 0–1.5)
BILIRUB UR QL STRIP: NEGATIVE
BUN SERPL-MCNC: 7 MG/DL (ref 6–20)
BUN/CREAT SERPL: 11.9 (ref 7–25)
CALCIUM SPEC-SCNC: 8.9 MG/DL (ref 8.6–10.5)
CHLORIDE SERPL-SCNC: 104 MMOL/L (ref 98–107)
CLARITY UR: ABNORMAL
CO2 SERPL-SCNC: 16 MMOL/L (ref 22–29)
COLOR UR: ABNORMAL
CREAT SERPL-MCNC: 0.59 MG/DL (ref 0.76–1.27)
DEPRECATED RDW RBC AUTO: 42 FL (ref 37–54)
EOSINOPHIL # BLD AUTO: 0 10*3/MM3 (ref 0–0.4)
EOSINOPHIL NFR BLD AUTO: 0.1 % (ref 0.3–6.2)
ERYTHROCYTE [DISTWIDTH] IN BLOOD BY AUTOMATED COUNT: 14.1 % (ref 12.3–15.4)
GFR SERPL CREATININE-BSD FRML MDRD: >150 ML/MIN/1.73
GLUCOSE SERPL-MCNC: 109 MG/DL (ref 65–99)
GLUCOSE UR STRIP-MCNC: NEGATIVE MG/DL
HCT VFR BLD AUTO: 38.4 % (ref 37.5–51)
HGB BLD-MCNC: 13.1 G/DL (ref 13–17.7)
HGB UR QL STRIP.AUTO: ABNORMAL
HYALINE CASTS UR QL AUTO: ABNORMAL /LPF
KETONES UR QL STRIP: ABNORMAL
LEUKOCYTE ESTERASE UR QL STRIP.AUTO: ABNORMAL
LYMPHOCYTES # BLD AUTO: 1.5 10*3/MM3 (ref 0.7–3.1)
LYMPHOCYTES NFR BLD AUTO: 13.4 % (ref 19.6–45.3)
MCH RBC QN AUTO: 28.4 PG (ref 26.6–33)
MCHC RBC AUTO-ENTMCNC: 34 G/DL (ref 31.5–35.7)
MCV RBC AUTO: 83.4 FL (ref 79–97)
MONOCYTES # BLD AUTO: 0.6 10*3/MM3 (ref 0.1–0.9)
MONOCYTES NFR BLD AUTO: 5.5 % (ref 5–12)
NEUTROPHILS NFR BLD AUTO: 80.7 % (ref 42.7–76)
NEUTROPHILS NFR BLD AUTO: 9.3 10*3/MM3 (ref 1.7–7)
NITRITE UR QL STRIP: NEGATIVE
NRBC BLD AUTO-RTO: 0 /100 WBC (ref 0–0.2)
PH UR STRIP.AUTO: 7.5 [PH] (ref 5–8)
PLATELET # BLD AUTO: 523 10*3/MM3 (ref 140–450)
PMV BLD AUTO: 6.5 FL (ref 6–12)
POTASSIUM SERPL-SCNC: 3.5 MMOL/L (ref 3.5–5.2)
PROT UR QL STRIP: ABNORMAL
RBC # BLD AUTO: 4.61 10*6/MM3 (ref 4.14–5.8)
RBC # UR: ABNORMAL /HPF
REF LAB TEST METHOD: ABNORMAL
SARS-COV-2 RNA PNL SPEC NAA+PROBE: NOT DETECTED
SODIUM SERPL-SCNC: 138 MMOL/L (ref 136–145)
SP GR UR STRIP: 1.02 (ref 1–1.03)
SQUAMOUS #/AREA URNS HPF: ABNORMAL /HPF
UROBILINOGEN UR QL STRIP: ABNORMAL
WBC # BLD AUTO: 11.5 10*3/MM3 (ref 3.4–10.8)
WBC UR QL AUTO: ABNORMAL /HPF

## 2021-11-09 PROCEDURE — 25010000002 KETOROLAC TROMETHAMINE PER 15 MG: Performed by: EMERGENCY MEDICINE

## 2021-11-09 PROCEDURE — 87635 SARS-COV-2 COVID-19 AMP PRB: CPT | Performed by: EMERGENCY MEDICINE

## 2021-11-09 PROCEDURE — G0378 HOSPITAL OBSERVATION PER HR: HCPCS

## 2021-11-09 PROCEDURE — 96361 HYDRATE IV INFUSION ADD-ON: CPT

## 2021-11-09 PROCEDURE — 25010000002 ONDANSETRON PER 1 MG: Performed by: NURSE PRACTITIONER

## 2021-11-09 PROCEDURE — 81001 URINALYSIS AUTO W/SCOPE: CPT | Performed by: EMERGENCY MEDICINE

## 2021-11-09 PROCEDURE — 80048 BASIC METABOLIC PNL TOTAL CA: CPT | Performed by: EMERGENCY MEDICINE

## 2021-11-09 PROCEDURE — 25010000002 KETOROLAC TROMETHAMINE PER 15 MG: Performed by: NURSE PRACTITIONER

## 2021-11-09 PROCEDURE — 25010000002 ONDANSETRON PER 1 MG: Performed by: EMERGENCY MEDICINE

## 2021-11-09 PROCEDURE — 87086 URINE CULTURE/COLONY COUNT: CPT | Performed by: EMERGENCY MEDICINE

## 2021-11-09 PROCEDURE — 96374 THER/PROPH/DIAG INJ IV PUSH: CPT

## 2021-11-09 PROCEDURE — 85025 COMPLETE CBC W/AUTO DIFF WBC: CPT | Performed by: EMERGENCY MEDICINE

## 2021-11-09 PROCEDURE — 96375 TX/PRO/DX INJ NEW DRUG ADDON: CPT

## 2021-11-09 PROCEDURE — 99284 EMERGENCY DEPT VISIT MOD MDM: CPT

## 2021-11-09 PROCEDURE — C9803 HOPD COVID-19 SPEC COLLECT: HCPCS

## 2021-11-09 PROCEDURE — 74018 RADEX ABDOMEN 1 VIEW: CPT

## 2021-11-09 PROCEDURE — 96376 TX/PRO/DX INJ SAME DRUG ADON: CPT

## 2021-11-09 RX ORDER — ONDANSETRON 4 MG/1
4 TABLET, FILM COATED ORAL EVERY 8 HOURS PRN
Status: DISCONTINUED | OUTPATIENT
Start: 2021-11-09 | End: 2021-11-09 | Stop reason: SDUPTHER

## 2021-11-09 RX ORDER — SODIUM CHLORIDE 0.9 % (FLUSH) 0.9 %
10 SYRINGE (ML) INJECTION AS NEEDED
Status: DISCONTINUED | OUTPATIENT
Start: 2021-11-09 | End: 2021-11-10 | Stop reason: HOSPADM

## 2021-11-09 RX ORDER — ACETAMINOPHEN 650 MG/1
650 SUPPOSITORY RECTAL EVERY 4 HOURS PRN
Status: DISCONTINUED | OUTPATIENT
Start: 2021-11-09 | End: 2021-11-10 | Stop reason: HOSPADM

## 2021-11-09 RX ORDER — OXYCODONE HYDROCHLORIDE 5 MG/1
10 TABLET ORAL EVERY 4 HOURS PRN
Status: DISCONTINUED | OUTPATIENT
Start: 2021-11-09 | End: 2021-11-10 | Stop reason: HOSPADM

## 2021-11-09 RX ORDER — CEPHALEXIN 500 MG/1
500 CAPSULE ORAL 2 TIMES DAILY
Status: DISCONTINUED | OUTPATIENT
Start: 2021-11-09 | End: 2021-11-10 | Stop reason: HOSPADM

## 2021-11-09 RX ORDER — ACETAMINOPHEN 160 MG/5ML
650 SOLUTION ORAL EVERY 4 HOURS PRN
Status: DISCONTINUED | OUTPATIENT
Start: 2021-11-09 | End: 2021-11-10 | Stop reason: HOSPADM

## 2021-11-09 RX ORDER — ONDANSETRON 2 MG/ML
4 INJECTION INTRAMUSCULAR; INTRAVENOUS ONCE
Status: COMPLETED | OUTPATIENT
Start: 2021-11-09 | End: 2021-11-09

## 2021-11-09 RX ORDER — SODIUM CHLORIDE 9 MG/ML
100 INJECTION, SOLUTION INTRAVENOUS CONTINUOUS
Status: DISCONTINUED | OUTPATIENT
Start: 2021-11-09 | End: 2021-11-10 | Stop reason: HOSPADM

## 2021-11-09 RX ORDER — KETOROLAC TROMETHAMINE 30 MG/ML
30 INJECTION, SOLUTION INTRAMUSCULAR; INTRAVENOUS EVERY 6 HOURS PRN
Status: DISCONTINUED | OUTPATIENT
Start: 2021-11-09 | End: 2021-11-10 | Stop reason: HOSPADM

## 2021-11-09 RX ORDER — SODIUM CHLORIDE 0.9 % (FLUSH) 0.9 %
10 SYRINGE (ML) INJECTION EVERY 12 HOURS SCHEDULED
Status: DISCONTINUED | OUTPATIENT
Start: 2021-11-09 | End: 2021-11-10 | Stop reason: HOSPADM

## 2021-11-09 RX ORDER — KETOROLAC TROMETHAMINE 30 MG/ML
30 INJECTION, SOLUTION INTRAMUSCULAR; INTRAVENOUS ONCE
Status: COMPLETED | OUTPATIENT
Start: 2021-11-09 | End: 2021-11-09

## 2021-11-09 RX ORDER — PROGESTERONE 100 MG/1
100 CAPSULE ORAL DAILY
Status: DISCONTINUED | OUTPATIENT
Start: 2021-11-09 | End: 2021-11-10 | Stop reason: HOSPADM

## 2021-11-09 RX ORDER — ONDANSETRON 2 MG/ML
4 INJECTION INTRAMUSCULAR; INTRAVENOUS EVERY 6 HOURS PRN
Status: DISCONTINUED | OUTPATIENT
Start: 2021-11-09 | End: 2021-11-10 | Stop reason: HOSPADM

## 2021-11-09 RX ORDER — ACETAZOLAMIDE 250 MG/1
500 TABLET ORAL DAILY
Status: DISCONTINUED | OUTPATIENT
Start: 2021-11-09 | End: 2021-11-10 | Stop reason: HOSPADM

## 2021-11-09 RX ORDER — ACETAMINOPHEN 325 MG/1
650 TABLET ORAL EVERY 4 HOURS PRN
Status: DISCONTINUED | OUTPATIENT
Start: 2021-11-09 | End: 2021-11-10 | Stop reason: HOSPADM

## 2021-11-09 RX ORDER — TAMSULOSIN HYDROCHLORIDE 0.4 MG/1
0.4 CAPSULE ORAL DAILY
Status: DISCONTINUED | OUTPATIENT
Start: 2021-11-09 | End: 2021-11-10 | Stop reason: HOSPADM

## 2021-11-09 RX ORDER — DOCUSATE SODIUM 100 MG/1
100 CAPSULE, LIQUID FILLED ORAL 2 TIMES DAILY
Status: DISCONTINUED | OUTPATIENT
Start: 2021-11-09 | End: 2021-11-10 | Stop reason: HOSPADM

## 2021-11-09 RX ADMIN — CEPHALEXIN 500 MG: 500 CAPSULE ORAL at 19:45

## 2021-11-09 RX ADMIN — TAMSULOSIN HYDROCHLORIDE 0.4 MG: 0.4 CAPSULE ORAL at 13:21

## 2021-11-09 RX ADMIN — SODIUM CHLORIDE 1000 ML: 9 INJECTION, SOLUTION INTRAVENOUS at 06:31

## 2021-11-09 RX ADMIN — ONDANSETRON 4 MG: 2 INJECTION INTRAMUSCULAR; INTRAVENOUS at 18:41

## 2021-11-09 RX ADMIN — Medication 10 ML: at 11:23

## 2021-11-09 RX ADMIN — SERTRALINE 50 MG: 50 TABLET, FILM COATED ORAL at 13:21

## 2021-11-09 RX ADMIN — KETOROLAC TROMETHAMINE 30 MG: 30 INJECTION, SOLUTION INTRAMUSCULAR at 06:31

## 2021-11-09 RX ADMIN — DOCUSATE SODIUM 100 MG: 100 CAPSULE, LIQUID FILLED ORAL at 11:23

## 2021-11-09 RX ADMIN — ONDANSETRON 4 MG: 2 INJECTION INTRAMUSCULAR; INTRAVENOUS at 11:23

## 2021-11-09 RX ADMIN — OXYCODONE 10 MG: 5 TABLET ORAL at 11:23

## 2021-11-09 RX ADMIN — SODIUM CHLORIDE 100 ML/HR: 9 INJECTION, SOLUTION INTRAVENOUS at 11:54

## 2021-11-09 RX ADMIN — SODIUM CHLORIDE 100 ML/HR: 9 INJECTION, SOLUTION INTRAVENOUS at 21:23

## 2021-11-09 RX ADMIN — DOCUSATE SODIUM 100 MG: 100 CAPSULE, LIQUID FILLED ORAL at 19:44

## 2021-11-09 RX ADMIN — KETOROLAC TROMETHAMINE 30 MG: 30 INJECTION, SOLUTION INTRAMUSCULAR at 19:45

## 2021-11-09 RX ADMIN — ACETAZOLAMIDE 500 MG: 250 TABLET ORAL at 13:21

## 2021-11-09 RX ADMIN — Medication 10 ML: at 19:45

## 2021-11-09 RX ADMIN — ONDANSETRON 4 MG: 2 INJECTION INTRAMUSCULAR; INTRAVENOUS at 06:31

## 2021-11-09 RX ADMIN — CEPHALEXIN 500 MG: 500 CAPSULE ORAL at 13:21

## 2021-11-10 ENCOUNTER — READMISSION MANAGEMENT (OUTPATIENT)
Dept: CALL CENTER | Facility: HOSPITAL | Age: 26
End: 2021-11-10

## 2021-11-10 VITALS
SYSTOLIC BLOOD PRESSURE: 133 MMHG | WEIGHT: 315 LBS | HEART RATE: 86 BPM | HEIGHT: 69 IN | OXYGEN SATURATION: 98 % | DIASTOLIC BLOOD PRESSURE: 82 MMHG | RESPIRATION RATE: 17 BRPM | TEMPERATURE: 98 F | BODY MASS INDEX: 46.65 KG/M2

## 2021-11-10 LAB
ANION GAP SERPL CALCULATED.3IONS-SCNC: 13 MMOL/L (ref 5–15)
BACTERIA SPEC AEROBE CULT: NO GROWTH
BASOPHILS # BLD AUTO: 0 10*3/MM3 (ref 0–0.2)
BASOPHILS NFR BLD AUTO: 0.2 % (ref 0–1.5)
BUN SERPL-MCNC: 8 MG/DL (ref 6–20)
BUN/CREAT SERPL: 16 (ref 7–25)
CALCIUM SPEC-SCNC: 7.8 MG/DL (ref 8.6–10.5)
CHLORIDE SERPL-SCNC: 109 MMOL/L (ref 98–107)
CO2 SERPL-SCNC: 16 MMOL/L (ref 22–29)
CREAT SERPL-MCNC: 0.5 MG/DL (ref 0.76–1.27)
DEPRECATED RDW RBC AUTO: 42.9 FL (ref 37–54)
EOSINOPHIL # BLD AUTO: 0.1 10*3/MM3 (ref 0–0.4)
EOSINOPHIL NFR BLD AUTO: 1.4 % (ref 0.3–6.2)
ERYTHROCYTE [DISTWIDTH] IN BLOOD BY AUTOMATED COUNT: 14.2 % (ref 12.3–15.4)
GFR SERPL CREATININE-BSD FRML MDRD: >150 ML/MIN/1.73
GLUCOSE SERPL-MCNC: 96 MG/DL (ref 65–99)
HCT VFR BLD AUTO: 36.4 % (ref 37.5–51)
HGB BLD-MCNC: 11.9 G/DL (ref 13–17.7)
LYMPHOCYTES # BLD AUTO: 1.9 10*3/MM3 (ref 0.7–3.1)
LYMPHOCYTES NFR BLD AUTO: 20.4 % (ref 19.6–45.3)
MCH RBC QN AUTO: 28.5 PG (ref 26.6–33)
MCHC RBC AUTO-ENTMCNC: 32.7 G/DL (ref 31.5–35.7)
MCV RBC AUTO: 87 FL (ref 79–97)
MONOCYTES # BLD AUTO: 0.7 10*3/MM3 (ref 0.1–0.9)
MONOCYTES NFR BLD AUTO: 7.7 % (ref 5–12)
NEUTROPHILS NFR BLD AUTO: 6.7 10*3/MM3 (ref 1.7–7)
NEUTROPHILS NFR BLD AUTO: 70.3 % (ref 42.7–76)
NRBC BLD AUTO-RTO: 0 /100 WBC (ref 0–0.2)
PLATELET # BLD AUTO: 424 10*3/MM3 (ref 140–450)
PMV BLD AUTO: 6.6 FL (ref 6–12)
POTASSIUM SERPL-SCNC: 3.5 MMOL/L (ref 3.5–5.2)
RBC # BLD AUTO: 4.18 10*6/MM3 (ref 4.14–5.8)
SODIUM SERPL-SCNC: 138 MMOL/L (ref 136–145)
WBC # BLD AUTO: 9.5 10*3/MM3 (ref 3.4–10.8)

## 2021-11-10 PROCEDURE — 80048 BASIC METABOLIC PNL TOTAL CA: CPT | Performed by: NURSE PRACTITIONER

## 2021-11-10 PROCEDURE — 96361 HYDRATE IV INFUSION ADD-ON: CPT

## 2021-11-10 PROCEDURE — 25010000002 ONDANSETRON PER 1 MG: Performed by: NURSE PRACTITIONER

## 2021-11-10 PROCEDURE — 25010000002 KETOROLAC TROMETHAMINE PER 15 MG: Performed by: NURSE PRACTITIONER

## 2021-11-10 PROCEDURE — 85025 COMPLETE CBC W/AUTO DIFF WBC: CPT | Performed by: NURSE PRACTITIONER

## 2021-11-10 PROCEDURE — G0378 HOSPITAL OBSERVATION PER HR: HCPCS

## 2021-11-10 PROCEDURE — 96376 TX/PRO/DX INJ SAME DRUG ADON: CPT

## 2021-11-10 RX ORDER — ONDANSETRON 4 MG/1
4 TABLET, ORALLY DISINTEGRATING ORAL EVERY 8 HOURS PRN
Qty: 12 TABLET | Refills: 0 | Status: SHIPPED | OUTPATIENT
Start: 2021-11-10 | End: 2021-11-14

## 2021-11-10 RX ORDER — HYDROCODONE BITARTRATE AND ACETAMINOPHEN 5; 325 MG/1; MG/1
1 TABLET ORAL EVERY 6 HOURS PRN
Qty: 12 TABLET | Refills: 0 | Status: SHIPPED | OUTPATIENT
Start: 2021-11-10 | End: 2021-11-13

## 2021-11-10 RX ORDER — POLYETHYLENE GLYCOL 3350 17 G/17G
17 POWDER, FOR SOLUTION ORAL DAILY
Status: DISCONTINUED | OUTPATIENT
Start: 2021-11-10 | End: 2021-11-10 | Stop reason: HOSPADM

## 2021-11-10 RX ADMIN — SODIUM CHLORIDE 100 ML/HR: 9 INJECTION, SOLUTION INTRAVENOUS at 05:51

## 2021-11-10 RX ADMIN — OXYCODONE 10 MG: 5 TABLET ORAL at 00:04

## 2021-11-10 RX ADMIN — ONDANSETRON 4 MG: 2 INJECTION INTRAMUSCULAR; INTRAVENOUS at 00:01

## 2021-11-10 RX ADMIN — SERTRALINE 50 MG: 50 TABLET, FILM COATED ORAL at 08:34

## 2021-11-10 RX ADMIN — ACETAZOLAMIDE 500 MG: 250 TABLET ORAL at 08:34

## 2021-11-10 RX ADMIN — KETOROLAC TROMETHAMINE 30 MG: 30 INJECTION, SOLUTION INTRAMUSCULAR at 05:52

## 2021-11-10 RX ADMIN — CEPHALEXIN 500 MG: 500 CAPSULE ORAL at 08:34

## 2021-11-10 RX ADMIN — TAMSULOSIN HYDROCHLORIDE 0.4 MG: 0.4 CAPSULE ORAL at 08:34

## 2021-11-10 NOTE — OUTREACH NOTE
Prep Survey      Responses   [facility] patient discharged from? [facility]   Is LACE score < 7 ? Yes   Emergency Room discharge w/ pulse ox? No   Eligibility [facility]   Date of Admission 11/09/21   Date of Discharge 11/10/21   Discharge Disposition Home or Self Care   Discharge diagnosis Flank and abdominal pain   Does the patient have one of the following disease processes/diagnoses(primary or secondary)? Other   Does the patient have Home health ordered? No   Is there a DME ordered? No   Prep survey completed? Yes          Maureen Magdaleno RN

## 2021-11-11 ENCOUNTER — TRANSITIONAL CARE MANAGEMENT TELEPHONE ENCOUNTER (OUTPATIENT)
Dept: CALL CENTER | Facility: HOSPITAL | Age: 26
End: 2021-11-11

## 2021-11-11 NOTE — OUTREACH NOTE
Call Center TCM Note      Responses   [facility] patient discharged from? [facility]   Does the patient have one of the following disease processes/diagnoses(primary or secondary)? Other   TCM attempt successful? Yes   Call start time 1315   Call end time 1326   General alerts for this patient Patient prefers to be called [place]- Male transgendering to female   Discharge diagnosis Flank and abdominal pain   Meds reviewed with patient/caregiver? Yes   Is the patient having any side effects they believe may be caused by any medication additions or changes? No   Does the patient have all medications ordered at discharge? Yes   Is the patient taking all medications as directed (includes completed medication regime)? Yes   Does the patient have a primary care provider?  Yes   Does the patient have an appointment with their PCP within 7 days of discharge? No   Comments regarding PCP Pt declines TCM FWP with PCP at this time, but pt will followup with urologist   What is preventing the patient from scheduling follow up appointments within 7 days of discharge? Earlier appointment not available   Nursing Interventions Verified appointment date/time/provider   Has the patient kept scheduled appointments due by today? N/A   Has home health visited the patient within 72 hours of discharge? N/A   Psychosocial issues? No   Did the patient receive a copy of their discharge instructions? Yes   Nursing interventions Reviewed instructions with patient   What is the patient's perception of their health status since discharge? Improving   Is the patient/caregiver able to teach back signs and symptoms related to disease process for when to call PCP? Yes   Is the patient/caregiver able to teach back signs and symptoms related to disease process for when to call 911? Yes   Is the patient/caregiver able to teach back the hierarchy of who to call/visit for symptoms/problems? PCP, Specialist, Home health nurse, Urgent Care, ED, 911 Yes    If the patient is a current smoker, are they able to teach back resources for cessation? Not a smoker   TCM call completed? Yes   Wrap up additional comments Pt is doing fine, pain is much improved, all meds in place.            Zaheer Torres RN    11/11/2021, 13:26 EST

## 2021-11-15 LAB
COLOR STONE: NORMAL
COM MFR STONE: 20 %
COMPN STONE: NORMAL
HYDROXYAPATITE 24H ENGDIFF UR: 80 %
LABORATORY COMMENT REPORT: NORMAL
Lab: NORMAL
Lab: NORMAL
PHOTO: NORMAL
SIZE STONE: NORMAL MM
SPEC SOURCE SUBJ: NORMAL
WT STONE: 25 MG

## 2022-03-19 ENCOUNTER — HOSPITAL ENCOUNTER (OUTPATIENT)
Facility: HOSPITAL | Age: 27
Setting detail: OBSERVATION
Discharge: HOME | End: 2022-03-20
Attending: EMERGENCY MEDICINE | Admitting: EMERGENCY MEDICINE

## 2022-03-19 ENCOUNTER — APPOINTMENT (OUTPATIENT)
Dept: CT IMAGING | Facility: HOSPITAL | Age: 27
End: 2022-03-19

## 2022-03-19 DIAGNOSIS — R10.9 FLANK PAIN: ICD-10-CM

## 2022-03-19 DIAGNOSIS — R10.9 RIGHT FLANK PAIN: ICD-10-CM

## 2022-03-19 DIAGNOSIS — N20.1 URETEROLITHIASIS: Primary | ICD-10-CM

## 2022-03-19 DIAGNOSIS — R11.2 NAUSEA AND VOMITING, INTRACTABILITY OF VOMITING NOT SPECIFIED, UNSPECIFIED VOMITING TYPE: ICD-10-CM

## 2022-03-19 LAB
ALBUMIN SERPL-MCNC: 3.9 G/DL (ref 3.5–5.2)
ALBUMIN/GLOB SERPL: 1.1 G/DL
ALP SERPL-CCNC: 68 U/L (ref 39–117)
ALT SERPL W P-5'-P-CCNC: 37 U/L (ref 1–41)
ANION GAP SERPL CALCULATED.3IONS-SCNC: 17 MMOL/L (ref 5–15)
AST SERPL-CCNC: 31 U/L (ref 1–40)
BACTERIA UR QL AUTO: ABNORMAL /HPF
BASOPHILS # BLD AUTO: 0 10*3/MM3 (ref 0–0.2)
BASOPHILS NFR BLD AUTO: 0.2 % (ref 0–1.5)
BILIRUB SERPL-MCNC: 0.5 MG/DL (ref 0–1.2)
BILIRUB UR QL STRIP: ABNORMAL
BUN SERPL-MCNC: 11 MG/DL (ref 6–20)
BUN/CREAT SERPL: 16.9 (ref 7–25)
CALCIUM SPEC-SCNC: 9 MG/DL (ref 8.6–10.5)
CHLORIDE SERPL-SCNC: 105 MMOL/L (ref 98–107)
CLARITY UR: ABNORMAL
CO2 SERPL-SCNC: 17 MMOL/L (ref 22–29)
COLOR UR: ABNORMAL
CREAT SERPL-MCNC: 0.65 MG/DL (ref 0.76–1.27)
DEPRECATED RDW RBC AUTO: 41.1 FL (ref 37–54)
EGFRCR SERPLBLD CKD-EPI 2021: 133.3 ML/MIN/1.73
EOSINOPHIL # BLD AUTO: 0 10*3/MM3 (ref 0–0.4)
EOSINOPHIL NFR BLD AUTO: 0.4 % (ref 0.3–6.2)
ERYTHROCYTE [DISTWIDTH] IN BLOOD BY AUTOMATED COUNT: 13.9 % (ref 12.3–15.4)
GLOBULIN UR ELPH-MCNC: 3.6 GM/DL
GLUCOSE SERPL-MCNC: 123 MG/DL (ref 65–99)
GLUCOSE UR STRIP-MCNC: NEGATIVE MG/DL
HCT VFR BLD AUTO: 39.3 % (ref 37.5–51)
HGB BLD-MCNC: 13 G/DL (ref 13–17.7)
HGB UR QL STRIP.AUTO: ABNORMAL
HYALINE CASTS UR QL AUTO: ABNORMAL /LPF
KETONES UR QL STRIP: ABNORMAL
LEUKOCYTE ESTERASE UR QL STRIP.AUTO: ABNORMAL
LIPASE SERPL-CCNC: 17 U/L (ref 13–60)
LYMPHOCYTES # BLD AUTO: 1.3 10*3/MM3 (ref 0.7–3.1)
LYMPHOCYTES NFR BLD AUTO: 10.7 % (ref 19.6–45.3)
MCH RBC QN AUTO: 27.4 PG (ref 26.6–33)
MCHC RBC AUTO-ENTMCNC: 33 G/DL (ref 31.5–35.7)
MCV RBC AUTO: 83.1 FL (ref 79–97)
MONOCYTES # BLD AUTO: 0.4 10*3/MM3 (ref 0.1–0.9)
MONOCYTES NFR BLD AUTO: 3.7 % (ref 5–12)
NEUTROPHILS NFR BLD AUTO: 10.2 10*3/MM3 (ref 1.7–7)
NEUTROPHILS NFR BLD AUTO: 85 % (ref 42.7–76)
NITRITE UR QL STRIP: NEGATIVE
NRBC BLD AUTO-RTO: 0 /100 WBC (ref 0–0.2)
PH UR STRIP.AUTO: 6 [PH] (ref 5–8)
PLATELET # BLD AUTO: 463 10*3/MM3 (ref 140–450)
PMV BLD AUTO: 6.6 FL (ref 6–12)
POTASSIUM SERPL-SCNC: 3.8 MMOL/L (ref 3.5–5.2)
PROT SERPL-MCNC: 7.5 G/DL (ref 6–8.5)
PROT UR QL STRIP: ABNORMAL
RBC # BLD AUTO: 4.73 10*6/MM3 (ref 4.14–5.8)
RBC # UR STRIP: ABNORMAL /HPF
REF LAB TEST METHOD: ABNORMAL
SARS-COV-2 RNA PNL SPEC NAA+PROBE: NOT DETECTED
SODIUM SERPL-SCNC: 139 MMOL/L (ref 136–145)
SP GR UR STRIP: 1.02 (ref 1–1.03)
SQUAMOUS #/AREA URNS HPF: ABNORMAL /HPF
UROBILINOGEN UR QL STRIP: ABNORMAL
WBC # UR STRIP: ABNORMAL /HPF
WBC NRBC COR # BLD: 12 10*3/MM3 (ref 3.4–10.8)
YEAST URNS QL MICRO: ABNORMAL /HPF

## 2022-03-19 PROCEDURE — 87086 URINE CULTURE/COLONY COUNT: CPT | Performed by: PHYSICIAN ASSISTANT

## 2022-03-19 PROCEDURE — 96361 HYDRATE IV INFUSION ADD-ON: CPT

## 2022-03-19 PROCEDURE — 96375 TX/PRO/DX INJ NEW DRUG ADDON: CPT

## 2022-03-19 PROCEDURE — 74176 CT ABD & PELVIS W/O CONTRAST: CPT

## 2022-03-19 PROCEDURE — 0 MORPHINE SULFATE 4 MG/ML SOLUTION: Performed by: PHYSICIAN ASSISTANT

## 2022-03-19 PROCEDURE — 87635 SARS-COV-2 COVID-19 AMP PRB: CPT | Performed by: EMERGENCY MEDICINE

## 2022-03-19 PROCEDURE — 25010000002 KETOROLAC TROMETHAMINE PER 15 MG: Performed by: PHYSICIAN ASSISTANT

## 2022-03-19 PROCEDURE — G0378 HOSPITAL OBSERVATION PER HR: HCPCS

## 2022-03-19 PROCEDURE — 25010000002 HYDROMORPHONE 1 MG/ML SOLUTION: Performed by: PHYSICIAN ASSISTANT

## 2022-03-19 PROCEDURE — 96374 THER/PROPH/DIAG INJ IV PUSH: CPT

## 2022-03-19 PROCEDURE — 96376 TX/PRO/DX INJ SAME DRUG ADON: CPT

## 2022-03-19 PROCEDURE — 80053 COMPREHEN METABOLIC PANEL: CPT | Performed by: PHYSICIAN ASSISTANT

## 2022-03-19 PROCEDURE — 99284 EMERGENCY DEPT VISIT MOD MDM: CPT

## 2022-03-19 PROCEDURE — C9803 HOPD COVID-19 SPEC COLLECT: HCPCS

## 2022-03-19 PROCEDURE — 25010000002 KETOROLAC TROMETHAMINE PER 15 MG: Performed by: NURSE PRACTITIONER

## 2022-03-19 PROCEDURE — 81001 URINALYSIS AUTO W/SCOPE: CPT | Performed by: PHYSICIAN ASSISTANT

## 2022-03-19 PROCEDURE — 83690 ASSAY OF LIPASE: CPT | Performed by: PHYSICIAN ASSISTANT

## 2022-03-19 PROCEDURE — 25010000002 ONDANSETRON PER 1 MG: Performed by: PHYSICIAN ASSISTANT

## 2022-03-19 PROCEDURE — 85025 COMPLETE CBC W/AUTO DIFF WBC: CPT | Performed by: PHYSICIAN ASSISTANT

## 2022-03-19 RX ORDER — ONDANSETRON 4 MG/1
4 TABLET, FILM COATED ORAL EVERY 6 HOURS PRN
Status: DISCONTINUED | OUTPATIENT
Start: 2022-03-19 | End: 2022-03-20 | Stop reason: HOSPADM

## 2022-03-19 RX ORDER — SODIUM CHLORIDE 9 MG/ML
100 INJECTION, SOLUTION INTRAVENOUS CONTINUOUS
Status: DISCONTINUED | OUTPATIENT
Start: 2022-03-19 | End: 2022-03-20 | Stop reason: HOSPADM

## 2022-03-19 RX ORDER — ACETAZOLAMIDE 250 MG/1
500 TABLET ORAL DAILY
Status: DISCONTINUED | OUTPATIENT
Start: 2022-03-20 | End: 2022-03-20 | Stop reason: HOSPADM

## 2022-03-19 RX ORDER — TAMSULOSIN HYDROCHLORIDE 0.4 MG/1
0.4 CAPSULE ORAL DAILY
Status: DISCONTINUED | OUTPATIENT
Start: 2022-03-19 | End: 2022-03-20 | Stop reason: HOSPADM

## 2022-03-19 RX ORDER — SODIUM CHLORIDE 0.9 % (FLUSH) 0.9 %
10 SYRINGE (ML) INJECTION AS NEEDED
Status: DISCONTINUED | OUTPATIENT
Start: 2022-03-19 | End: 2022-03-20 | Stop reason: HOSPADM

## 2022-03-19 RX ORDER — HYDROCODONE BITARTRATE AND ACETAMINOPHEN 7.5; 325 MG/1; MG/1
1 TABLET ORAL EVERY 4 HOURS PRN
Status: DISCONTINUED | OUTPATIENT
Start: 2022-03-19 | End: 2022-03-20 | Stop reason: HOSPADM

## 2022-03-19 RX ORDER — PROGESTERONE 100 MG/1
100 CAPSULE ORAL DAILY
Refills: 3 | Status: DISCONTINUED | OUTPATIENT
Start: 2022-03-20 | End: 2022-03-20 | Stop reason: HOSPADM

## 2022-03-19 RX ORDER — ONDANSETRON 2 MG/ML
4 INJECTION INTRAMUSCULAR; INTRAVENOUS ONCE
Status: COMPLETED | OUTPATIENT
Start: 2022-03-19 | End: 2022-03-19

## 2022-03-19 RX ORDER — KETOROLAC TROMETHAMINE 15 MG/ML
15 INJECTION, SOLUTION INTRAMUSCULAR; INTRAVENOUS EVERY 6 HOURS PRN
Status: DISCONTINUED | OUTPATIENT
Start: 2022-03-19 | End: 2022-03-20 | Stop reason: HOSPADM

## 2022-03-19 RX ORDER — MORPHINE SULFATE 4 MG/ML
4 INJECTION, SOLUTION INTRAMUSCULAR; INTRAVENOUS ONCE
Status: COMPLETED | OUTPATIENT
Start: 2022-03-19 | End: 2022-03-19

## 2022-03-19 RX ORDER — SODIUM CHLORIDE 0.9 % (FLUSH) 0.9 %
10 SYRINGE (ML) INJECTION EVERY 12 HOURS SCHEDULED
Status: DISCONTINUED | OUTPATIENT
Start: 2022-03-19 | End: 2022-03-20 | Stop reason: HOSPADM

## 2022-03-19 RX ORDER — KETOROLAC TROMETHAMINE 15 MG/ML
15 INJECTION, SOLUTION INTRAMUSCULAR; INTRAVENOUS ONCE
Status: COMPLETED | OUTPATIENT
Start: 2022-03-19 | End: 2022-03-19

## 2022-03-19 RX ORDER — ONDANSETRON 2 MG/ML
4 INJECTION INTRAMUSCULAR; INTRAVENOUS EVERY 6 HOURS PRN
Status: DISCONTINUED | OUTPATIENT
Start: 2022-03-19 | End: 2022-03-20 | Stop reason: HOSPADM

## 2022-03-19 RX ADMIN — ONDANSETRON 4 MG: 2 INJECTION INTRAMUSCULAR; INTRAVENOUS at 12:45

## 2022-03-19 RX ADMIN — KETOROLAC TROMETHAMINE 15 MG: 15 INJECTION, SOLUTION INTRAMUSCULAR; INTRAVENOUS at 18:21

## 2022-03-19 RX ADMIN — MORPHINE SULFATE 4 MG: 4 INJECTION INTRAVENOUS at 10:50

## 2022-03-19 RX ADMIN — SODIUM CHLORIDE 1000 ML: 9 INJECTION, SOLUTION INTRAVENOUS at 10:50

## 2022-03-19 RX ADMIN — ONDANSETRON 4 MG: 2 INJECTION INTRAMUSCULAR; INTRAVENOUS at 10:50

## 2022-03-19 RX ADMIN — KETOROLAC TROMETHAMINE 15 MG: 15 INJECTION, SOLUTION INTRAMUSCULAR; INTRAVENOUS at 10:50

## 2022-03-19 RX ADMIN — SODIUM CHLORIDE 100 ML/HR: 9 INJECTION, SOLUTION INTRAVENOUS at 18:26

## 2022-03-19 RX ADMIN — HYDROMORPHONE HYDROCHLORIDE 1 MG: 1 INJECTION, SOLUTION INTRAMUSCULAR; INTRAVENOUS; SUBCUTANEOUS at 12:45

## 2022-03-19 RX ADMIN — TAMSULOSIN HYDROCHLORIDE 0.4 MG: 0.4 CAPSULE ORAL at 18:21

## 2022-03-19 RX ADMIN — LIDOCAINE HYDROCHLORIDE 150 MG: 10 INJECTION, SOLUTION INFILTRATION; PERINEURAL at 12:19

## 2022-03-19 RX ADMIN — HYDROMORPHONE HYDROCHLORIDE 1 MG: 1 INJECTION, SOLUTION INTRAMUSCULAR; INTRAVENOUS; SUBCUTANEOUS at 16:14

## 2022-03-19 NOTE — NURSING NOTE
Talked with Dr Grider and made aware of patients admission.  Per his standpoint ok to discharge and follow-up as outpatient. Read the CT report and feels patient should be able to pass stone.  Continue to strain urine.

## 2022-03-19 NOTE — H&P
[facility] H&P    Patient Name: Amos Powell  : 1995  MRN: [id number]  Primary Care Physician: Nabil Jason MD  Date of admission: 3/19/2022     Patient Care Team:  Nabil Jason MD as PCP - General (Family Medicine)          Subjective   History Present Illness     Chief Complaint:   Chief Complaint   Patient presents with   • Flank Pain         Mr. Powell is a 26 y.o.  presents to [facility] complaining of left flank pain.      26-year-old self identified female who presents to the ER with a chief complaint of left flank pain with associated nausea and vomiting.  The patient had CT with identification of a 3 mm stone located in the proximal left ureter.   Patient states she has had kidney stones in the past and cannot tolerate pain. The patient's pain is well controlled with analgesics and antiemetics.  Patient reports subjective chills with no overt fever.  Patient denies dysuria.      Review of Systems   Constitutional: Positive for chills. Negative for diaphoresis and fever.   Gastrointestinal: Positive for nausea and vomiting.   Genitourinary: Positive for flank pain. Negative for dysuria.   All other systems reviewed and are negative.      Personal History     Past Medical History:   Past Medical History:   Diagnosis Date   • Constipation due to opioid therapy 2016   • Depression with anxiety 2020   • Gastroesophageal reflux disease without esophagitis 2020   • Gender dysphoria    • Hormonal imbalance in transgender patient 2020   • Kidney calculi    • Obesity, Class III, BMI 40-49.9 (morbid obesity) ([facility]) 2020       Surgical History:      Past Surgical History:   Procedure Laterality Date   • CYSTOSCOPY BLADDER STONE LITHOTRIPSY     • CYSTOSCOPY W/ URETERAL STENT PLACEMENT Left 2021    Procedure: CYSTOSCOPY, LEFT URETEROSCOPY, RETROGRADE PYELOGRAM, LEFT STENT INSERTION;  Surgeon: Jayden Cox MD;  Location: [facility] OR;   Service: Urology;  Laterality: Left;   • CYSTOSCOPY, URETEROSCOPY, RETROGRADE PYELOGRAM, STENT INSERTION Right 8/4/2020    Procedure: CYSTOSCOPY WITH RIGHT URETERAL STENT PLACEMENT;  Surgeon: Delano Hester MD;  Location: [facility] OR;  Service: Urology;  Laterality: Right;   • WISDOM TOOTH EXTRACTION Bilateral     top and bottom           Family History: family history includes Diabetes type II in his paternal grandfather; Hypertension in his maternal grandmother. Otherwise pertinent FHx was reviewed and unremarkable.     Social History:  reports that he has never smoked. He has never used smokeless tobacco. He reports current alcohol use. He reports previous drug use. Drug: Marijuana.      Medications:  Prior to Admission medications    Medication Sig Start Date End Date Taking? Authorizing Provider   acetaZOLAMIDE (DIAMOX) 500 MG capsule Take 500 mg by mouth Daily. 11/19/19  Yes ProviderCharo MD   estradiol valerate (DELESTROGEN) 20 MG/ML injection Inject 0.2 mL into the appropriate muscle as directed by prescriber 2 (Two) Times a Week. 9/9/21  Yes Nabil Jason MD   ondansetron (ZOFRAN) 4 MG tablet Take 1 tablet by mouth Every 8 (Eight) Hours As Needed for Nausea or Vomiting. 3/20/21  Yes Brant Nesbitt MD   progesterone (PROMETRIUM) 100 MG capsule Take 1 capsule by mouth Daily. 2/23/21  Yes Nabil Jason MD   tamsulosin (FLOMAX) 0.4 MG capsule 24 hr capsule Take 1 capsule by mouth Daily. 3/20/21  Yes Brant Nesbitt MD   sertraline (ZOLOFT) 50 MG tablet Take 1 tablet by mouth Daily for 180 days. 2/23/21 8/22/21  Nabil Jason MD       Allergies:  No Known Allergies    Objective   Objective     Vital Signs  Temp:  [98 °F (36.7 °C)-98.8 °F (37.1 °C)] 98 °F (36.7 °C)  Heart Rate:  [] 112  Resp:  [18-19] 18  BP: (124-153)/() 148/89  SpO2:  [89 %-98 %] 94 %  on   ;   Device (Oxygen Therapy): room air  Body mass index is 48.18 kg/m².    Physical Exam  Vitals  and nursing note reviewed.   Constitutional:       General: He is not in acute distress.     Appearance: Normal appearance. He is obese. He is not ill-appearing, toxic-appearing or diaphoretic.   HENT:      Head: Normocephalic.      Right Ear: External ear normal.      Left Ear: External ear normal.      Nose: Nose normal.      Mouth/Throat:      Mouth: Mucous membranes are moist.   Eyes:      General: No scleral icterus.        Right eye: No discharge.         Left eye: No discharge.      Extraocular Movements: Extraocular movements intact.      Conjunctiva/sclera: Conjunctivae normal.      Pupils: Pupils are equal, round, and reactive to light.   Cardiovascular:      Rate and Rhythm: Normal rate and regular rhythm.      Pulses: Normal pulses.      Heart sounds: Normal heart sounds. No murmur heard.  Pulmonary:      Effort: Pulmonary effort is normal.      Breath sounds: Normal breath sounds.   Abdominal:      General: Bowel sounds are normal.      Palpations: Abdomen is soft.   Musculoskeletal:         General: Normal range of motion.      Cervical back: Normal range of motion and neck supple.      Right lower leg: No edema.      Left lower leg: No edema.   Skin:     General: Skin is warm and dry.      Capillary Refill: Capillary refill takes less than 2 seconds.   Neurological:      General: No focal deficit present.      Mental Status: He is alert and oriented to person, place, and time.   Psychiatric:         Mood and Affect: Mood normal.         Behavior: Behavior normal.         Thought Content: Thought content normal.         Judgment: Judgment normal.         Results Review:  I have personally reviewed most recent cardiac tracings, lab results and radiology images and interpretations and agree with findings.    Results from last 7 days   Lab Units 03/19/22  1046   WBC 10*3/mm3 12.00*   HEMOGLOBIN g/dL 13.0   HEMATOCRIT % 39.3   PLATELETS 10*3/mm3 463*     Results from last 7 days   Lab Units 03/19/22  1046    SODIUM mmol/L 139   POTASSIUM mmol/L 3.8   CHLORIDE mmol/L 105   CO2 mmol/L 17.0*   BUN mg/dL 11   CREATININE mg/dL 0.65*   GLUCOSE mg/dL 123*   CALCIUM mg/dL 9.0   ALT (SGPT) U/L 37   AST (SGOT) U/L 31     Estimated Creatinine Clearance: 248.5 mL/min (A) (by C-G formula based on SCr of 0.65 mg/dL (L)).  Brief Urine Lab Results  (Last result in the past 365 days)      Color   Clarity   Blood   Leuk Est   Nitrite   Protein   CREAT   Urine HCG        03/19/22 1156 Red  Comment: Any Substance that causes an abnormal urine color can alter the accuracy of the chemical reactions.   Turbid  Comment: Result checked    Large (3+)   Small (1+)   Negative   100 mg/dL (2+)                 Microbiology Results (last 10 days)     Procedure Component Value - Date/Time    COVID PRE-OP / PRE-PROCEDURE SCREENING ORDER (NO ISOLATION) - Swab, Nasopharynx [[id number]]  (Normal) Collected: 03/19/22 1526    Lab Status: Final result Specimen: Swab from Nasopharynx Updated: 03/19/22 1605    Narrative:      The following orders were created for panel order COVID PRE-OP / PRE-PROCEDURE SCREENING ORDER (NO ISOLATION) - Swab, Nasopharynx.  Procedure                               Abnormality         Status                     ---------                               -----------         ------                     COVID-19,CEPHEID/BELTRAN,CO...[[id number]]  Normal              Final result                 Please view results for these tests on the individual orders.    COVID-19,CEPHEID/BELTRAN,COR/WING/PAD/NASIM IN-HOUSE(OR EMERGENT/ADD-ON),NP SWAB IN TRANSPORT MEDIA 3-4 HR TAT, RT-PCR - Swab, Nasopharynx [[id number]]  (Normal) Collected: 03/19/22 1526    Lab Status: Final result Specimen: Swab from Nasopharynx Updated: 03/19/22 1605     COVID19 Not Detected    Narrative:      Fact sheet for providers: https://www.fda.gov/media/902859/download     Fact sheet for patients: https://www.fda.gov/media/666475/download  Fact sheet for providers:  https://www.fda.gov/media/766198/download    Fact sheet for patients: https://www.fda.gov/media/605261/download    Test performed by PCR.          ECG/EMG Results (most recent)     None                  CT Abdomen Pelvis Without Contrast    Result Date: 3/19/2022   1. 3 mm stone located in the proximal left ureter causing mild-moderate obstructive uropathy. 2. There is a punctate stone in the upper pole calyx of the left kidney. 3. Hepatic steatosis. 4. The remaining examination is unremarkable. The study is limited by noncontrast technique.  Electronically Signed By-Bo Porter MD On:3/19/2022 12:09 PM This report was finalized on [id number] by  Bo Porter MD.        Estimated Creatinine Clearance: 248.5 mL/min (A) (by C-G formula based on SCr of 0.65 mg/dL (L)).    Assessment/Plan   Assessment/Plan       Active Hospital Problems    Diagnosis  POA   • Ureterolithiasis [N20.1]  Yes      Resolved Hospital Problems   No resolved problems to display.     Ureterolithiasis, left: Urology consult; analgesics and antiemetics; continue Flomax    Transgender identity with hormone replacement: Continue Diamox; continue estradiol; continue progesterone    Depression, chronic: Continue Zoloft      VTE Prophylaxis -   Mechanical Order History:      Ordered        03/19/22 1805  Place Sequential Compression Device  Once            03/19/22 1805  Maintain Sequential Compression Device  Continuous                    Pharmalogical Order History:     None          CODE STATUS:    Code Status and Medical Interventions:   Ordered at: 03/19/22 1805     Code Status (Patient has no pulse and is not breathing):    CPR (Attempt to Resuscitate)     Medical Interventions (Patient has pulse or is breathing):    Full Support       This patient has been examined wearing personal protective equipment.     I discussed the patient's findings and my recommendations with patient.      Signature:Electronically signed by MICHEL Delarosa,  03/20/22, 7:37 AM EDT.

## 2022-03-19 NOTE — CONSULTS
Urology Consult  Patient Identification:  Name: Amos Powell  Age: 26 y.o.  Sex: male  : 1995  MRN: [id number]   Chief Complaint: flank pain  History of Present Illness:     1. Stone - 3mm leftupj, hydro, pain was 10/10, improved with pain meds, afebrile , radiating to the back, worse with movement    Ct independently reviewed by myself = lfet 3mm upj stone       Problem List:  Active Hospital Problems    Diagnosis  POA   • Ureterolithiasis [N20.1]  Yes      Resolved Hospital Problems   No resolved problems to display.     Past Medical History:  Past Medical History:   Diagnosis Date   • Constipation due to opioid therapy 2016   • Depression with anxiety 2020   • Gastroesophageal reflux disease without esophagitis 2020   • Gender dysphoria    • Hormonal imbalance in transgender patient 2020   • Kidney calculi    • Obesity, Class III, BMI 40-49.9 (morbid obesity) ([facility]) 2020     Past Surgical History:  Past Surgical History:   Procedure Laterality Date   • CYSTOSCOPY BLADDER STONE LITHOTRIPSY     • CYSTOSCOPY W/ URETERAL STENT PLACEMENT Left 2021    Procedure: CYSTOSCOPY, LEFT URETEROSCOPY, RETROGRADE PYELOGRAM, LEFT STENT INSERTION;  Surgeon: Jayden Cox MD;  Location: [facility] OR;  Service: Urology;  Laterality: Left;   • CYSTOSCOPY, URETEROSCOPY, RETROGRADE PYELOGRAM, STENT INSERTION Right 2020    Procedure: CYSTOSCOPY WITH RIGHT URETERAL STENT PLACEMENT;  Surgeon: Delano Hester MD;  Location: [facility] OR;  Service: Urology;  Laterality: Right;   • WISDOM TOOTH EXTRACTION Bilateral     top and bottom      Home Meds:  Medications Prior to Admission   Medication Sig Dispense Refill Last Dose   • acetaZOLAMIDE (DIAMOX) 500 MG capsule Take 500 mg by mouth Daily.   3/19/2022 at Unknown time   • estradiol valerate (DELESTROGEN) 20 MG/ML injection Inject 0.2 mL into the appropriate muscle as directed by prescriber 2 (Two) Times a Week. 5 mL 3 3/18/2022 at  Unknown time   • ondansetron (ZOFRAN) 4 MG tablet Take 1 tablet by mouth Every 8 (Eight) Hours As Needed for Nausea or Vomiting. 20 tablet 0 3/19/2022 at Unknown time   • progesterone (PROMETRIUM) 100 MG capsule Take 1 capsule by mouth Daily. 90 capsule 3 3/18/2022 at Unknown time   • tamsulosin (FLOMAX) 0.4 MG capsule 24 hr capsule Take 1 capsule by mouth Daily. 30 capsule 0 Past Month at Unknown time   • sertraline (ZOLOFT) 50 MG tablet Take 1 tablet by mouth Daily for 180 days. 90 tablet 1      Current Meds:     Current Facility-Administered Medications:   •  [START ON 3/20/2022] acetaZOLAMIDE (DIAMOX) tablet 500 mg, 500 mg, Oral, Daily, Lilly Castellano APRN  •  HYDROcodone-acetaminophen (NORCO) 7.5-325 MG per tablet 1 tablet, 1 tablet, Oral, Q4H PRN, Lilly Castellano APRN  •  influenza vac split quad (FLUZONE,FLUARIX,AFLURIA,FLULAVAL) injection 0.5 mL, 0.5 mL, Intramuscular, During Hospitalization, Vick Lujan DO  •  ketorolac (TORADOL) injection 15 mg, 15 mg, Intravenous, Q6H PRN, Lilly Castellano APRN, 15 mg at 03/19/22 1821  •  ondansetron (ZOFRAN) tablet 4 mg, 4 mg, Oral, Q6H PRN **OR** ondansetron (ZOFRAN) injection 4 mg, 4 mg, Intravenous, Q6H PRN, Lilly Castellano, MICHEL  •  [START ON 3/20/2022] Progesterone (PROMETRIUM) capsule 100 mg, 100 mg, Oral, Daily, Lilly Castellano, APRABIMBOLA  •  [START ON 3/20/2022] sertraline (ZOLOFT) tablet 50 mg, 50 mg, Oral, Daily, Lilly Castellano APRN  •  [COMPLETED] Insert peripheral IV, , , Once **AND** sodium chloride 0.9 % flush 10 mL, 10 mL, Intravenous, PRN, Zoey Alcazar PA  •  sodium chloride 0.9 % flush 10 mL, 10 mL, Intravenous, Q12H, Lilly Castellano APRN  •  sodium chloride 0.9 % flush 10 mL, 10 mL, Intravenous, PRN, Lilly Castellano APRN  •  sodium chloride 0.9 % infusion, 100 mL/hr, Intravenous, Continuous, Lilly Castellano APRN, Last Rate: 100 mL/hr at 03/19/22 1826, 100 mL/hr at 03/19/22 1826  •  tamsulosin (FLOMAX) 24 hr capsule 0.4 mg, 0.4 mg, Oral, Daily, Lilly Castellano APRN, 0.4 mg  "at 22 1821  Allergies:  No Known Allergies  Immunizations:  Immunization History   Administered Date(s) Administered   • DTP / HiB 1996   • DTaP, Unspecified 1999   • FluLaval/Fluarix/Fluzone >6 2018, 2020   • Hep B, Unspecified 1996   • MMR 1996, 1999   • Meningococcal MCV4P (Menactra) 2013   • OPV 1999   • Tdap 2006   • Varicella 2013     Social History:   Social History     Tobacco Use   • Smoking status: Never Smoker   • Smokeless tobacco: Never Used   Substance Use Topics   • Alcohol use: Yes     Comment: 1 a month      Family History:  Family History   Problem Relation Age of Onset   • Hypertension Maternal Grandmother    • Diabetes type II Paternal Grandfather       Review of Systems  negative 12 point system review except:as stated in the hpi  Objective:  tMax 24 hrs: Temp (24hrs), Av.5 °F (36.9 °C), Min:98 °F (36.7 °C), Max:98.8 °F (37.1 °C)    Vitals Ranges:   Temp:  [98 °F (36.7 °C)-98.8 °F (37.1 °C)] 98.6 °F (37 °C)  Heart Rate:  [] 106  Resp:  [16-19] 16  BP: (119-153)/() 119/72  Intake and Output Last 3 Shifts:   I/O last 3 completed shifts:  In: -   Out: 400 [Urine:400]  Exam:  /72 (BP Location: Left arm, Patient Position: Lying)   Pulse 106   Temp 98.6 °F (37 °C) (Oral)   Resp 16   Ht 175.3 cm (69\")   Wt (!) 148 kg (326 lb 4.5 oz)   SpO2 96%   BMI 48.18 kg/m²       General Appearance: Alert, cooperative, no distress, appears stated age   Head: Normocephalic, without obvious abnormality, atraumatic   ENT: Normal hearing, external inspection, ears and nose   Eyes: Normal pupils/irises, external inspection, conjunctivae, eyelids   Back: No CVA tenderness   Respiratory: Normal effort, palpation, auscultation   CV: Normal auscultation, carotid pulses, no edema   Abdomen: No hernia, soft, nontender, no masses   :    Musculoskeletal: Normal extremities, nails, digits   Skin: Normal skin color, texture, no " rashes or lesion   Neurologic/psych: Normal orientation, mood, affect           Data Review:  CBC:   Results from last 7 days   Lab Units 03/19/22  1046   WBC 10*3/mm3 12.00*   RBC 10*6/mm3 4.73      Assessment:    Ureterolithiasis    Left ureteral stone      Plan:    Options given     Will make npo for possible stent tomorrow       Carlos Grider MD  3/19/2022

## 2022-03-19 NOTE — PLAN OF CARE
Problem: Adult Inpatient Plan of Care  Goal: Plan of Care Review  Outcome: Ongoing, Progressing  Flowsheets (Taken 3/19/2022 1063)  Progress: no change  Plan of Care Reviewed With: patient  Outcome Evaluation: new admit  Goal: Absence of Hospital-Acquired Illness or Injury  Outcome: Ongoing, Progressing  Goal: Optimal Comfort and Wellbeing  Outcome: Ongoing, Progressing  Goal: Readiness for Transition of Care  Outcome: Ongoing, Progressing   Goal Outcome Evaluation:  Plan of Care Reviewed With: patient        Progress: no change  Outcome Evaluation: new admit

## 2022-03-19 NOTE — ED PROVIDER NOTES
Subjective   Chief Complaint: Flank pain    Patient is a 26-year-old transgender male who identifies as female with a past medical history of constipation due to opioid therapy, depression, anxiety, GERD, gender dysmorphia, hormone imbalance in transgender patient, kidney calculi, obesity presenting to ED for complaints of left flank pain x1 day.  Patient reports onset of a stabbing left-sided flank pain with radiation down to the left groin that started last night with associated symptoms including nausea, vomiting, and fatigue.  Patient rates pain as a 7/10 on a pain scale.  Patient reports that he has vomited 5 or 6 times over the past day notably, took Zofran for symptom management this morning.  Patient denies any dysuria, hematuria, urinary frequency, diarrhea or changes in bowel habits.  Patient denies any chest pain or shortness of breath.  Patient reports symptoms are worse with ambulation, movement and relieved with lying down.    Location: Left flank    Quality: Sharp stabbing    Duration: 1 day    Timing: Constant    Severity: Moderate to severe    Associated Symptoms: Nausea, vomiting, fatigue    PCP: Nabil Jason      History provided by:  Patient      Review of Systems   Constitutional: Positive for fatigue. Negative for appetite change and fever.   HENT: Negative for sore throat and trouble swallowing.    Respiratory: Negative for chest tightness, shortness of breath and wheezing.    Cardiovascular: Negative for chest pain and palpitations.   Gastrointestinal: Positive for abdominal pain, nausea and vomiting. Negative for constipation and diarrhea.   Genitourinary: Positive for flank pain. Negative for dysuria and hematuria.        Left-sided flank pain with radiation to left groin   Musculoskeletal: Negative for myalgias.   Skin: Negative for rash.   Neurological: Negative for weakness and headaches.   Psychiatric/Behavioral: Negative for behavioral problems.   All other systems reviewed  and are negative.      Past Medical History:   Diagnosis Date   • Constipation due to opioid therapy 6/7/2016   • Depression with anxiety 1/8/2020   • Gastroesophageal reflux disease without esophagitis 5/21/2020   • Gender dysphoria    • Hormonal imbalance in transgender patient 1/8/2020   • Kidney calculi    • Obesity, Class III, BMI 40-49.9 (morbid obesity) ([facility]) 8/4/2020       No Known Allergies    Past Surgical History:   Procedure Laterality Date   • CYSTOSCOPY BLADDER STONE LITHOTRIPSY     • CYSTOSCOPY W/ URETERAL STENT PLACEMENT Left 11/6/2021    Procedure: CYSTOSCOPY, LEFT URETEROSCOPY, RETROGRADE PYELOGRAM, LEFT STENT INSERTION;  Surgeon: Jayden Cox MD;  Location: [facility] OR;  Service: Urology;  Laterality: Left;   • CYSTOSCOPY, URETEROSCOPY, RETROGRADE PYELOGRAM, STENT INSERTION Right 8/4/2020    Procedure: CYSTOSCOPY WITH RIGHT URETERAL STENT PLACEMENT;  Surgeon: Delano Hester MD;  Location: [facility] OR;  Service: Urology;  Laterality: Right;   • WISDOM TOOTH EXTRACTION Bilateral     top and bottom       Family History   Problem Relation Age of Onset   • Hypertension Maternal Grandmother    • Diabetes type II Paternal Grandfather        Social History     Socioeconomic History   • Marital status: Single   Tobacco Use   • Smoking status: Never Smoker   • Smokeless tobacco: Never Used   Substance and Sexual Activity   • Alcohol use: Yes     Comment: 1 a month   • Drug use: Not Currently     Types: Marijuana     Comment: rarely   • Sexual activity: Yes     Partners: Female     Birth control/protection: Condom           Objective   Physical Exam  Vitals and nursing note reviewed.   Constitutional:       General: He is not in acute distress.     Appearance: Normal appearance. He is obese. He is ill-appearing. He is not diaphoretic.   HENT:      Head: Normocephalic and atraumatic.   Eyes:      Pupils: Pupils are equal, round, and reactive to light.   Cardiovascular:      Rate and  "Rhythm: Regular rhythm. Tachycardia present.      Pulses: Normal pulses.      Heart sounds: Normal heart sounds. No murmur heard.  Pulmonary:      Effort: Pulmonary effort is normal.      Breath sounds: Normal breath sounds.   Abdominal:      General: Bowel sounds are normal.      Tenderness: There is abdominal tenderness. There is left CVA tenderness. There is no right CVA tenderness or guarding.      Hernia: No hernia is present.   Musculoskeletal:         General: Normal range of motion.      Cervical back: Normal range of motion.   Skin:     General: Skin is warm.      Capillary Refill: Capillary refill takes less than 2 seconds.   Neurological:      General: No focal deficit present.      Mental Status: He is alert and oriented to person, place, and time.   Psychiatric:         Mood and Affect: Mood normal.         Behavior: Behavior normal.         Procedures           ED Course  ED Course as of 03/19/22 1645   Sat Mar 19, 2022   1245 Patient reevaluated, still complaining of severe pain and nausea.  Additional orders placed.   [MM]   1347 Patient reevaluated, reports that they feel much better at this time, they are resting in bed comfortably with eyes closed. [MM]   1500 Patient reevaluated, still complaining of moderate pain.  Discussed admission versus discharge with patient at bedside, patient prefers to be observed for IV fluids, antiemetics pain control and urology consult. [MM]      ED Course User Index  [MM] Zoey Alcazar PA    /83   Pulse 98   Temp 98.8 °F (37.1 °C)   Resp 19   Ht 175.3 cm (69\")   Wt (!) 147 kg (323 lb)   SpO2 93%   BMI 47.70 kg/m²   Labs Reviewed   COMPREHENSIVE METABOLIC PANEL - Abnormal; Notable for the following components:       Result Value    Glucose 123 (*)     Creatinine 0.65 (*)     CO2 17.0 (*)     Anion Gap 17.0 (*)     All other components within normal limits    Narrative:     GFR Normal >60  Chronic Kidney Disease <60  Kidney Failure <15     URINALYSIS " W/ CULTURE IF INDICATED - Abnormal; Notable for the following components:    Color, UA Red (*)     Appearance, UA Turbid (*)     Ketones, UA Trace (*)     Bilirubin, UA Small (1+) (*)     Blood, UA Large (3+) (*)     Protein,  mg/dL (2+) (*)     Leuk Esterase, UA Small (1+) (*)     All other components within normal limits   CBC WITH AUTO DIFFERENTIAL - Abnormal; Notable for the following components:    WBC 12.00 (*)     Platelets 463 (*)     Neutrophil % 85.0 (*)     Lymphocyte % 10.7 (*)     Monocyte % 3.7 (*)     Neutrophils, Absolute 10.20 (*)     All other components within normal limits   URINALYSIS, MICROSCOPIC ONLY - Abnormal; Notable for the following components:    RBC, UA Too Numerous to Count (*)     WBC, UA 6-12 (*)     Bacteria, UA 1+ (*)     Squamous Epithelial Cells, UA 3-6 (*)     All other components within normal limits   COVID-19,CEPHEID/BELTRAN,COR/WING/PAD/NASIM IN-HOUSE,NP SWAB IN TRANSPORT MEDIA 3-4 HR TAT, RT-PCR - Normal    Narrative:     Fact sheet for providers: https://www.fda.gov/media/120875/download     Fact sheet for patients: https://www.fda.gov/media/215181/download  Fact sheet for providers: https://www.fda.gov/media/722254/download    Fact sheet for patients: https://www.fda.gov/media/118470/download    Test performed by PCR.   LIPASE - Normal   COVID PRE-OP / PRE-PROCEDURE SCREENING ORDER (NO ISOLATION)    Narrative:     The following orders were created for panel order COVID PRE-OP / PRE-PROCEDURE SCREENING ORDER (NO ISOLATION) - Swab, Nasopharynx.  Procedure                               Abnormality         Status                     ---------                               -----------         ------                     COVID-19,CEPHEID/BELTRAN,CO...[[id number]]  Normal              Final result                 Please view results for these tests on the individual orders.   URINE CULTURE   CBC AND DIFFERENTIAL    Narrative:     The following orders were created for panel order CBC  & Differential.  Procedure                               Abnormality         Status                     ---------                               -----------         ------                     CBC Auto Differential[[id number]]        Abnormal            Final result                 Please view results for these tests on the individual orders.     Medications   sodium chloride 0.9 % flush 10 mL (has no administration in time range)   sodium chloride 0.9 % bolus 1,000 mL (0 mL Intravenous Stopped 3/19/22 1120)   ketorolac (TORADOL) injection 15 mg (15 mg Intravenous Given 3/19/22 1050)   ondansetron (ZOFRAN) injection 4 mg (4 mg Intravenous Given 3/19/22 1050)   Morphine sulfate (PF) injection 4 mg (4 mg Intravenous Given 3/19/22 1050)   lidocaine (XYLOCAINE) 1 % 150 mg in sodium chloride 0.9 % 100 mL IVPB (150 mg Intravenous Given 3/19/22 1219)   HYDROmorphone (DILAUDID) injection 1 mg (1 mg Intravenous Given 3/19/22 1245)   ondansetron (ZOFRAN) injection 4 mg (4 mg Intravenous Given 3/19/22 1245)   HYDROmorphone (DILAUDID) injection 1 mg (1 mg Intravenous Given 3/19/22 1614)     CT Abdomen Pelvis Without Contrast    Result Date: 3/19/2022   1. 3 mm stone located in the proximal left ureter causing mild-moderate obstructive uropathy. 2. There is a punctate stone in the upper pole calyx of the left kidney. 3. Hepatic steatosis. 4. The remaining examination is unremarkable. The study is limited by noncontrast technique.  Electronically Signed By-Bo Porter MD On:3/19/2022 12:09 PM This report was finalized on [id number] by  Bo Porter MD.                                                 MDM  Number of Diagnoses or Management Options  Flank pain  Nausea and vomiting, intractability of vomiting not specified, unspecified vomiting type  Ureterolithiasis  Diagnosis management comments: MEDICAL DECISION  Epic Chart Review: Patient last admission 11/9/2021 for intractable abdominal pain and nausea vomiting  Comorbidities:  Depression, GERD, gender dysmorphia, history of kidney stones  Differentials: Kidney stone, pyelonephritis, UTI, diverticulitis; this list is not all inclusive and does not constitute the entirety of considered causes  Radiology interpretation:  Images reviewed by me and interpreted by radiologist, as above  Lab interpretation:  Labs viewed by me significant for, as above    While in the ED IV was placed and labs were obtained appropriate PPE was worn during exam and throughout all encounters with the patient.  Patient had the above evaluation.  IV established, lab work obtained.  Patient given 1 L IV fluids as well as 4 mg morphine, 4 mg Zofran and 15 mg Toradol.  Urinalysis turbid, 3+ blood, 2+ protein, 1+ leukocytes, TNTC RBCs, 6-12 WBCs, plus bacteria.  CBC mild leukocytosis 12,000.  CMP glucose 123, creatinine 0.65, CO2 17.0.  Lipase normal 17.  CT abdomen pelvis shows 3 mm stone located in the proximal left ureter causing mild to moderate obstructive uropathy, exam otherwise unremarkable.  On reevaluation patient is actively dry heaving and complaining of additional pain.  Patient then given 1 mg Dilaudid and additional 4 mg Zofran.  Patient's pain improved after these medications.  Patient's pain persisted, patient required additional pain medication for comfort and pain control.  Patient be placed in the ED observation unit for pain control, IV hydration, antiemetics, urology consultation for proximal ureteral stone.  Patient is agreeable with this plan.  I spoke with RADHA Carr who agreed to accept patient to the observation unit for further care.         Amount and/or Complexity of Data Reviewed  Clinical lab tests: reviewed and ordered  Tests in the radiology section of CPT®: reviewed and ordered    Patient Progress  Patient progress: stable      Final diagnoses:   Ureterolithiasis   Flank pain   Nausea and vomiting, intractability of vomiting not specified, unspecified vomiting type       ED  Disposition  ED Disposition     ED Disposition   Decision to Admit    Condition   --    Comment   --             No follow-up provider specified.       Medication List      No changes were made to your prescriptions during this visit.          Zoey Alcazar PA  03/19/22 2965

## 2022-03-20 ENCOUNTER — READMISSION MANAGEMENT (OUTPATIENT)
Dept: CALL CENTER | Facility: HOSPITAL | Age: 27
End: 2022-03-20

## 2022-03-20 VITALS
TEMPERATURE: 97.6 F | HEIGHT: 69 IN | BODY MASS INDEX: 46.65 KG/M2 | RESPIRATION RATE: 16 BRPM | HEART RATE: 94 BPM | SYSTOLIC BLOOD PRESSURE: 137 MMHG | DIASTOLIC BLOOD PRESSURE: 75 MMHG | WEIGHT: 315 LBS | OXYGEN SATURATION: 97 %

## 2022-03-20 LAB
ANION GAP SERPL CALCULATED.3IONS-SCNC: 13 MMOL/L (ref 5–15)
BACTERIA SPEC AEROBE CULT: NORMAL
BASOPHILS # BLD AUTO: 0.1 10*3/MM3 (ref 0–0.2)
BASOPHILS NFR BLD AUTO: 0.6 % (ref 0–1.5)
BUN SERPL-MCNC: 11 MG/DL (ref 6–20)
BUN/CREAT SERPL: 19.6 (ref 7–25)
CALCIUM SPEC-SCNC: 8.1 MG/DL (ref 8.6–10.5)
CHLORIDE SERPL-SCNC: 106 MMOL/L (ref 98–107)
CO2 SERPL-SCNC: 19 MMOL/L (ref 22–29)
CREAT SERPL-MCNC: 0.56 MG/DL (ref 0.76–1.27)
DEPRECATED RDW RBC AUTO: 40.3 FL (ref 37–54)
EGFRCR SERPLBLD CKD-EPI 2021: 139.4 ML/MIN/1.73
EOSINOPHIL # BLD AUTO: 0 10*3/MM3 (ref 0–0.4)
EOSINOPHIL NFR BLD AUTO: 0.4 % (ref 0.3–6.2)
ERYTHROCYTE [DISTWIDTH] IN BLOOD BY AUTOMATED COUNT: 13.9 % (ref 12.3–15.4)
GLUCOSE SERPL-MCNC: 102 MG/DL (ref 65–99)
HCT VFR BLD AUTO: 34.7 % (ref 37.5–51)
HGB BLD-MCNC: 11.7 G/DL (ref 13–17.7)
LYMPHOCYTES # BLD AUTO: 2.5 10*3/MM3 (ref 0.7–3.1)
LYMPHOCYTES NFR BLD AUTO: 19.7 % (ref 19.6–45.3)
MCH RBC QN AUTO: 28.1 PG (ref 26.6–33)
MCHC RBC AUTO-ENTMCNC: 33.9 G/DL (ref 31.5–35.7)
MCV RBC AUTO: 83.1 FL (ref 79–97)
MONOCYTES # BLD AUTO: 0.9 10*3/MM3 (ref 0.1–0.9)
MONOCYTES NFR BLD AUTO: 6.9 % (ref 5–12)
NEUTROPHILS NFR BLD AUTO: 72.4 % (ref 42.7–76)
NEUTROPHILS NFR BLD AUTO: 9.1 10*3/MM3 (ref 1.7–7)
NRBC BLD AUTO-RTO: 0 /100 WBC (ref 0–0.2)
PLATELET # BLD AUTO: 408 10*3/MM3 (ref 140–450)
PMV BLD AUTO: 6.8 FL (ref 6–12)
POTASSIUM SERPL-SCNC: 3.2 MMOL/L (ref 3.5–5.2)
RBC # BLD AUTO: 4.18 10*6/MM3 (ref 4.14–5.8)
SODIUM SERPL-SCNC: 138 MMOL/L (ref 136–145)
WBC NRBC COR # BLD: 12.6 10*3/MM3 (ref 3.4–10.8)

## 2022-03-20 PROCEDURE — 85025 COMPLETE CBC W/AUTO DIFF WBC: CPT | Performed by: NURSE PRACTITIONER

## 2022-03-20 PROCEDURE — G0378 HOSPITAL OBSERVATION PER HR: HCPCS

## 2022-03-20 PROCEDURE — 80048 BASIC METABOLIC PNL TOTAL CA: CPT | Performed by: NURSE PRACTITIONER

## 2022-03-20 PROCEDURE — 96361 HYDRATE IV INFUSION ADD-ON: CPT

## 2022-03-20 RX ORDER — ONDANSETRON 4 MG/1
4 TABLET, FILM COATED ORAL EVERY 6 HOURS PRN
Qty: 12 TABLET | Refills: 0 | Status: SHIPPED | OUTPATIENT
Start: 2022-03-20 | End: 2022-12-29

## 2022-03-20 RX ORDER — HYDROCODONE BITARTRATE AND ACETAMINOPHEN 7.5; 325 MG/1; MG/1
1 TABLET ORAL EVERY 4 HOURS PRN
Qty: 20 TABLET | Refills: 0 | Status: SHIPPED | OUTPATIENT
Start: 2022-03-20 | End: 2022-03-26

## 2022-03-20 RX ORDER — POTASSIUM CHLORIDE 20 MEQ/1
40 TABLET, EXTENDED RELEASE ORAL ONCE
Status: COMPLETED | OUTPATIENT
Start: 2022-03-20 | End: 2022-03-20

## 2022-03-20 RX ORDER — CEPHALEXIN 500 MG/1
500 CAPSULE ORAL 2 TIMES DAILY
Qty: 14 CAPSULE | Refills: 0 | Status: SHIPPED | OUTPATIENT
Start: 2022-03-20 | End: 2022-03-27

## 2022-03-20 RX ADMIN — POTASSIUM CHLORIDE 40 MEQ: 1500 TABLET, EXTENDED RELEASE ORAL at 12:48

## 2022-03-20 RX ADMIN — ACETAZOLAMIDE 500 MG: 250 TABLET ORAL at 09:14

## 2022-03-20 RX ADMIN — SERTRALINE 50 MG: 50 TABLET, FILM COATED ORAL at 09:14

## 2022-03-20 RX ADMIN — SODIUM CHLORIDE 100 ML/HR: 9 INJECTION, SOLUTION INTRAVENOUS at 03:58

## 2022-03-20 RX ADMIN — TAMSULOSIN HYDROCHLORIDE 0.4 MG: 0.4 CAPSULE ORAL at 09:14

## 2022-03-20 NOTE — OUTREACH NOTE
Prep Survey    Flowsheet Row Responses   [facility] patient discharged from? [facility]   Is LACE score < 7 ? Yes   Emergency Room discharge w/ pulse ox? No   Eligibility [facility]   Date of Admission 03/19/22   Date of Discharge 03/20/22   Discharge Disposition Home or Self Care   Discharge diagnosis Ureterolithiasis    Does the patient have one of the following disease processes/diagnoses(primary or secondary)? Other   Does the patient have Home health ordered? No   Is there a DME ordered? No   Prep survey completed? Yes          NICOLAS GRISSOM - Registered Nurse

## 2022-03-20 NOTE — DISCHARGE SUMMARY
[facility]  DISCHARGE SUMMARY        Prepared For PCP:  Nabil Jason MD    Patient Name: Amos Powell  : 1995  MRN: [id number]      Date of Admission:   3/19/2022    Date of Discharge:  3/20/2022    Length of stay:  LOS: 0 days     Hospital Course     Presenting Problem:   Ureterolithiasis [N20.1]  Flank pain [R10.9]  Nausea and vomiting, intractability of vomiting not specified, unspecified vomiting type [R11.2]      Active Hospital Problems    Diagnosis  POA   • Ureterolithiasis [N20.1]  Yes      Resolved Hospital Problems   No resolved problems to display.           Hospital Course:  Amos Powell is a 26 y.o. male who gender identifies as a female who presented to the ER with left flank pain and was noted to have urolithiasis 3 mm stone.  Patient was seen by urology with recommendation for pain control and pain was well controlled overnight on Norco 7.5 mg every 6 hours as needed.  The patient will be discharged on Norco as needed for pain, Zofran as needed for nausea, and resume taking daily dose of Flomax 0.4 mg daily.  The patient will be contacted by urology office for follow-up according to documentation from Dr. Miller.  The patient had mild hypokalemia with potassium 3.2 on morning of discharge and will be ordered 40 mEq p.o. x1 prior to discharge.  The patient is agreeable to plan for discharge and is eager to go home.          Recommendation for Outpatient Providers:             Reasons For Change In Medications and Indications for New Medications:        Day of Discharge     HPI:   26-year-old self identified female who presents to the ER with a chief complaint of left flank pain with associated nausea and vomiting.  The patient had CT with identification of a 3 mm stone located in the proximal left ureter.   Patient states she has had kidney stones in the past and cannot tolerate pain. The patient's pain is well controlled with analgesics and antiemetics.  Patient reports  subjective chills with no overt fever.  Patient denies dysuria.    Vital Signs:   Temp:  [97.6 °F (36.4 °C)-98.6 °F (37 °C)] 97.6 °F (36.4 °C)  Heart Rate:  [] 94  Resp:  [16-18] 16  BP: (119-148)/(68-89) 137/75     ROS:  Review of Systems   All other systems reviewed and are negative.      Physical Exam  Vitals and nursing note reviewed.   Constitutional:       General: He is not in acute distress.     Appearance: Normal appearance. He is obese. He is not ill-appearing, toxic-appearing or diaphoretic.   HENT:      Head: Normocephalic.      Right Ear: External ear normal.      Left Ear: External ear normal.      Nose: Nose normal.      Mouth/Throat:      Mouth: Mucous membranes are moist.   Eyes:      General: No scleral icterus.        Right eye: No discharge.         Left eye: No discharge.      Extraocular Movements: Extraocular movements intact.      Conjunctiva/sclera: Conjunctivae normal.      Pupils: Pupils are equal, round, and reactive to light.   Cardiovascular:      Rate and Rhythm: Normal rate and regular rhythm.      Pulses: Normal pulses.      Heart sounds: Normal heart sounds. No murmur heard.  Pulmonary:      Effort: Pulmonary effort is normal.      Breath sounds: Normal breath sounds.   Abdominal:      General: Bowel sounds are normal.      Palpations: Abdomen is soft.   Musculoskeletal:         General: Normal range of motion.      Cervical back: Normal range of motion and neck supple.      Right lower leg: No edema.      Left lower leg: No edema.   Skin:     General: Skin is warm and dry.      Capillary Refill: Capillary refill takes less than 2 seconds.   Neurological:      General: No focal deficit present.      Mental Status: He is alert and oriented to person, place, and time.   Psychiatric:         Mood and Affect: Mood normal.         Behavior: Behavior normal.         Thought Content: Thought content normal.         Judgment: Judgment normal.        Pertinent  and/or Most Recent  Results     Results from last 7 days   Lab Units 03/20/22  0412 03/19/22  1046   WBC 10*3/mm3 12.60* 12.00*   HEMOGLOBIN g/dL 11.7* 13.0   HEMATOCRIT % 34.7* 39.3   PLATELETS 10*3/mm3 408 463*   SODIUM mmol/L 138 139   POTASSIUM mmol/L 3.2* 3.8   CHLORIDE mmol/L 106 105   CO2 mmol/L 19.0* 17.0*   BUN mg/dL 11 11   CREATININE mg/dL 0.56* 0.65*   GLUCOSE mg/dL 102* 123*   CALCIUM mg/dL 8.1* 9.0     Results from last 7 days   Lab Units 03/19/22  1046   BILIRUBIN mg/dL 0.5   ALK PHOS U/L 68   ALT (SGPT) U/L 37   AST (SGOT) U/L 31           Invalid input(s): TG, LDLCALC, LDLREALC        Brief Urine Lab Results  (Last result in the past 365 days)      Color   Clarity   Blood   Leuk Est   Nitrite   Protein   CREAT   Urine HCG        03/19/22 1156 Red  Comment: Any Substance that causes an abnormal urine color can alter the accuracy of the chemical reactions.   Turbid  Comment: Result checked    Large (3+)   Small (1+)   Negative   100 mg/dL (2+)                 Microbiology Results Abnormal     Procedure Component Value - Date/Time    COVID PRE-OP / PRE-PROCEDURE SCREENING ORDER (NO ISOLATION) - Swab, Nasopharynx [[id number]]  (Normal) Collected: 03/19/22 1526    Lab Status: Final result Specimen: Swab from Nasopharynx Updated: 03/19/22 1605    Narrative:      The following orders were created for panel order COVID PRE-OP / PRE-PROCEDURE SCREENING ORDER (NO ISOLATION) - Swab, Nasopharynx.  Procedure                               Abnormality         Status                     ---------                               -----------         ------                     COVID-19,CEPHEID/BELTRAN,CO...[[id number]]  Normal              Final result                 Please view results for these tests on the individual orders.    COVID-19,CEPHEID/BELTRAN,COR/WING/PAD/NASIM IN-HOUSE(OR EMERGENT/ADD-ON),NP SWAB IN TRANSPORT MEDIA 3-4 HR TAT, RT-PCR - Swab, Nasopharynx [[id number]]  (Normal) Collected: 03/19/22 1526    Lab Status: Final result  Specimen: Swab from Nasopharynx Updated: 03/19/22 1605     COVID19 Not Detected    Narrative:      Fact sheet for providers: https://www.fda.gov/media/287031/download     Fact sheet for patients: https://www.fda.gov/media/649429/download  Fact sheet for providers: https://www.fda.gov/media/954483/download    Fact sheet for patients: https://www.fda.gov/media/997754/download    Test performed by PCR.          CT Abdomen Pelvis Without Contrast    Result Date: 3/19/2022  Impression:  1. 3 mm stone located in the proximal left ureter causing mild-moderate obstructive uropathy. 2. There is a punctate stone in the upper pole calyx of the left kidney. 3. Hepatic steatosis. 4. The remaining examination is unremarkable. The study is limited by noncontrast technique.  Electronically Signed By-Bo Porter MD On:3/19/2022 12:09 PM This report was finalized on [id number] by  Bo Porter MD.                          Test Results Pending at Discharge  Pending Labs     Order Current Status    Urine Culture - Urine, Urine, Clean Catch In process            Procedures Performed           Consults:   Consults     Date and Time Order Name Status Description    3/19/2022  3:03 PM Urology (on-call MD unless specified)              Discharge Details        Discharge Medications      New Medications      Instructions Start Date   cephalexin 500 MG capsule  Commonly known as: Keflex   500 mg, Oral, 2 Times Daily      HYDROcodone-acetaminophen 7.5-325 MG per tablet  Commonly known as: NORCO   1 tablet, Oral, Every 4 Hours PRN         Changes to Medications      Instructions Start Date   ondansetron 4 MG tablet  Commonly known as: ZOFRAN  What changed: when to take this   4 mg, Oral, Every 6 Hours PRN         Continue These Medications      Instructions Start Date   acetaZOLAMIDE 500 MG capsule  Commonly known as: DIAMOX   500 mg, Oral, Daily      estradiol valerate 20 MG/ML injection  Commonly known as: DELESTROGEN   4 mg,  Intramuscular, 2 Times Weekly      progesterone 100 MG capsule  Commonly known as: PROMETRIUM   100 mg, Oral, Daily      sertraline 50 MG tablet  Commonly known as: ZOLOFT   50 mg, Oral, Daily      tamsulosin 0.4 MG capsule 24 hr capsule  Commonly known as: FLOMAX   0.4 mg, Oral, Daily             No Known Allergies      Discharge Disposition:  Home or Self Care    Diet:  Hospital:  Diet Order   Procedures   • Diet Regular         Discharge Activity:         CODE STATUS:    Code Status and Medical Interventions:   Ordered at: 03/19/22 1804     Code Status (Patient has no pulse and is not breathing):    CPR (Attempt to Resuscitate)     Medical Interventions (Patient has pulse or is breathing):    Full Support         Follow-up Appointments  No future appointments.          Condition on Discharge:      Stable      This patient has been examined wearing appropriate Personal Protective Equipment. 03/20/22      Electronically signed by MICHEL Delarosa, 03/20/22, 12:25 PM EDT.      Time: I spent  60  minutes on this discharge activity which included face-to-face encounter with the patient/reviewing the data in the system/coordination of the care with the nursing staff as well as consultants/documentation/entering orders.

## 2022-03-20 NOTE — PROGRESS NOTES
Urology Progress Note    Patient Identification:  Name:  Amos Powell  Age:  26 y.o.  Sex:  male  :  1995  MRN:  [id number]    Chief Complaint: flank pain    History of Present Illness:    1. Stone - 3mm leftupj, hydro, pain was 10/10, improved with pain meds, afebrile , radiating to the back, worse with movement     Ct independently reviewed by myself = lfet 3mm upj stone       3/20/22- no ain this am    Problem List:    Past Medical History:  Past Medical History:   Diagnosis Date   • Constipation due to opioid therapy 2016   • Depression with anxiety 2020   • Gastroesophageal reflux disease without esophagitis 2020   • Gender dysphoria    • Hormonal imbalance in transgender patient 2020   • Kidney calculi    • Obesity, Class III, BMI 40-49.9 (morbid obesity) ([facility]) 2020     Past Surgical History:  Past Surgical History:   Procedure Laterality Date   • CYSTOSCOPY BLADDER STONE LITHOTRIPSY     • CYSTOSCOPY W/ URETERAL STENT PLACEMENT Left 2021    Procedure: CYSTOSCOPY, LEFT URETEROSCOPY, RETROGRADE PYELOGRAM, LEFT STENT INSERTION;  Surgeon: Jayden Cox MD;  Location: [facility];  Service: Urology;  Laterality: Left;   • CYSTOSCOPY, URETEROSCOPY, RETROGRADE PYELOGRAM, STENT INSERTION Right 2020    Procedure: CYSTOSCOPY WITH RIGHT URETERAL STENT PLACEMENT;  Surgeon: Delano Hester MD;  Location: [facility] OR;  Service: Urology;  Laterality: Right;   • WISDOM TOOTH EXTRACTION Bilateral     top and bottom     Home Meds:  Medications Prior to Admission   Medication Sig Dispense Refill Last Dose   • acetaZOLAMIDE (DIAMOX) 500 MG capsule Take 500 mg by mouth Daily.   3/19/2022 at Unknown time   • estradiol valerate (DELESTROGEN) 20 MG/ML injection Inject 0.2 mL into the appropriate muscle as directed by prescriber 2 (Two) Times a Week. 5 mL 3 3/18/2022 at Unknown time   • ondansetron (ZOFRAN) 4 MG tablet Take 1 tablet by mouth Every 8 (Eight) Hours As Needed for  Nausea or Vomiting. 20 tablet 0 3/19/2022 at Unknown time   • progesterone (PROMETRIUM) 100 MG capsule Take 1 capsule by mouth Daily. 90 capsule 3 3/18/2022 at Unknown time   • tamsulosin (FLOMAX) 0.4 MG capsule 24 hr capsule Take 1 capsule by mouth Daily. 30 capsule 0 Past Month at Unknown time   • sertraline (ZOLOFT) 50 MG tablet Take 1 tablet by mouth Daily for 180 days. 90 tablet 1      Current Meds:    Current Facility-Administered Medications:   •  acetaZOLAMIDE (DIAMOX) tablet 500 mg, 500 mg, Oral, Daily, Lilly Castellano APRN  •  HYDROcodone-acetaminophen (NORCO) 7.5-325 MG per tablet 1 tablet, 1 tablet, Oral, Q4H PRN, Lilly Castellano APRN  •  influenza vac split quad (FLUZONE,FLUARIX,AFLURIA,FLULAVAL) injection 0.5 mL, 0.5 mL, Intramuscular, During Hospitalization, Vick Lujan DO  •  ketorolac (TORADOL) injection 15 mg, 15 mg, Intravenous, Q6H PRN, Lilly Castellano, MICHEL, 15 mg at 03/19/22 1821  •  ondansetron (ZOFRAN) tablet 4 mg, 4 mg, Oral, Q6H PRN **OR** ondansetron (ZOFRAN) injection 4 mg, 4 mg, Intravenous, Q6H PRN, Lilly Castellano, APRABIMBOLA  •  Progesterone (PROMETRIUM) capsule 100 mg, 100 mg, Oral, Daily, Lilly Castellano APRABIMBOLA  •  sertraline (ZOLOFT) tablet 50 mg, 50 mg, Oral, Daily, Lilly Castellano, APRABIMBOLA  •  [COMPLETED] Insert peripheral IV, , , Once **AND** sodium chloride 0.9 % flush 10 mL, 10 mL, Intravenous, PRN, Zoey Alcazar PA  •  sodium chloride 0.9 % flush 10 mL, 10 mL, Intravenous, Q12H, Lilly Castellano, APRABIMBOLA  •  sodium chloride 0.9 % flush 10 mL, 10 mL, Intravenous, PRN, Lilly Castellano, APRABIMBOLA  •  sodium chloride 0.9 % infusion, 100 mL/hr, Intravenous, Continuous, Lilly Castellano APRN, Last Rate: 100 mL/hr at 03/20/22 0358, 100 mL/hr at 03/20/22 0358  •  tamsulosin (FLOMAX) 24 hr capsule 0.4 mg, 0.4 mg, Oral, Daily, Lilly Castellano APRN, 0.4 mg at 03/19/22 1821  Allergies:  Patient has no known allergies.    Review of Systems:  Negative 12 point system review except: as stated in the hpi    Objective:  tMax 24  "hours:  [unfilled]  Vital Sign Ranges:  Temp:  [98 °F (36.7 °C)-98.8 °F (37.1 °C)] 98.4 °F (36.9 °C)  Heart Rate:  [] 110  Resp:  [16-19] 18  BP: (119-153)/() 125/68  Intake and Output Last 3 Shifts:  I/O last 3 completed shifts:  In: -   Out: 400 [Urine:400]    Exam:  /68 (BP Location: Left arm, Patient Position: Lying)   Pulse 110   Temp 98.4 °F (36.9 °C) (Oral)   Resp 18   Ht 175.3 cm (69\")   Wt (!) 148 kg (326 lb 4.5 oz)   SpO2 96%   BMI 48.18 kg/m²    General Appearance:  Alert, cooperative, no acute distress, general         appearance is normal   Head:  Normocephalic, without obvious abnormality, atraumatic   Eyes:          Pupils/Irises normal. Exterior inspection conjunctivae       and lids normal.   Ears:  Normal external inspection   Nose: Exterior inspection of nose is normal   Throat: Lips, mucosa, and tongue normal   Neck: No adenopathy, supple, symmetrical, trachea midline   Back:   No CVA tenderness   Lungs:   Respirations unlabored; normal effort, no audible     abnormality   CV: Regular rhythm and normal rate, no edema   Abdomen:   No hernia, examination of the abdomen is normal with     no masses, tenderness, or distension    Male :      Musculoskeletal: Inspection of the nails and digits reveals no abnormalities   Skin: Inspection normal, palpation normal   Neurologic/Psych: Orientation normal; Mood/Affect normal     Data Review:  All labs (24hrs):    Lab Results (last 24 hours)     Procedure Component Value Units Date/Time    Basic Metabolic Panel [[id number]]  (Abnormal) Collected: 03/20/22 0412    Specimen: Blood Updated: 03/20/22 0508     Glucose 102 mg/dL      BUN 11 mg/dL      Creatinine 0.56 mg/dL      Sodium 138 mmol/L      Potassium 3.2 mmol/L      Chloride 106 mmol/L      CO2 19.0 mmol/L      Calcium 8.1 mg/dL      BUN/Creatinine Ratio 19.6     Anion Gap 13.0 mmol/L      eGFR 139.4 mL/min/1.73      Comment: National Kidney Foundation and American Society of " Nephrology (ASN) Task Force recommended calculation based on the Chronic Kidney Disease Epidemiology Collaboration (CKD-EPI) equation refit without adjustment for race.       Narrative:      GFR Normal >60  Chronic Kidney Disease <60  Kidney Failure <15      CBC Auto Differential [[id number]]  (Abnormal) Collected: 03/20/22 0412    Specimen: Blood Updated: 03/20/22 0447     WBC 12.60 10*3/mm3      RBC 4.18 10*6/mm3      Hemoglobin 11.7 g/dL      Hematocrit 34.7 %      MCV 83.1 fL      MCH 28.1 pg      MCHC 33.9 g/dL      RDW 13.9 %      RDW-SD 40.3 fl      MPV 6.8 fL      Platelets 408 10*3/mm3      Neutrophil % 72.4 %      Lymphocyte % 19.7 %      Monocyte % 6.9 %      Eosinophil % 0.4 %      Basophil % 0.6 %      Neutrophils, Absolute 9.10 10*3/mm3      Lymphocytes, Absolute 2.50 10*3/mm3      Monocytes, Absolute 0.90 10*3/mm3      Eosinophils, Absolute 0.00 10*3/mm3      Basophils, Absolute 0.10 10*3/mm3      nRBC 0.0 /100 WBC     COVID PRE-OP / PRE-PROCEDURE SCREENING ORDER (NO ISOLATION) - Swab, Nasopharynx [[id number]]  (Normal) Collected: 03/19/22 1526    Specimen: Swab from Nasopharynx Updated: 03/19/22 1605    Narrative:      The following orders were created for panel order COVID PRE-OP / PRE-PROCEDURE SCREENING ORDER (NO ISOLATION) - Swab, Nasopharynx.  Procedure                               Abnormality         Status                     ---------                               -----------         ------                     COVID-19,CEPHEID/BELTRAN,CO...[[id number]]  Normal              Final result                 Please view results for these tests on the individual orders.    COVID-19,CEPHEID/BELTRAN,COR/WING/PAD/NASIM IN-HOUSE(OR EMERGENT/ADD-ON),NP SWAB IN TRANSPORT MEDIA 3-4 HR TAT, RT-PCR - Swab, Nasopharynx [[id number]]  (Normal) Collected: 03/19/22 1526    Specimen: Swab from Nasopharynx Updated: 03/19/22 1605     COVID19 Not Detected    Narrative:      Fact sheet for providers:  https://www.fda.gov/media/855358/download     Fact sheet for patients: https://www.fda.gov/media/706805/download  Fact sheet for providers: https://www.fda.gov/media/344980/download    Fact sheet for patients: https://www.fda.gov/media/826197/download    Test performed by PCR.    Urinalysis, Microscopic Only - Urine, Clean Catch [[id number]]  (Abnormal) Collected: 03/19/22 1156    Specimen: Urine, Clean Catch Updated: 03/19/22 1222     RBC, UA Too Numerous to Count /HPF      WBC, UA 6-12 /HPF      Bacteria, UA 1+ /HPF      Squamous Epithelial Cells, UA 3-6 /HPF      Yeast, UA Small/1+ Budding Yeast /HPF      Hyaline Casts, UA None Seen /LPF      Methodology Manual Light Microscopy    Urine Culture - Urine, Urine, Clean Catch [[id number]] Collected: 03/19/22 1156    Specimen: Urine, Clean Catch Updated: 03/19/22 1222    Urinalysis With Culture If Indicated - Urine, Clean Catch [[id number]]  (Abnormal) Collected: 03/19/22 1156    Specimen: Urine, Clean Catch Updated: 03/19/22 1212     Color, UA Red     Comment: Any Substance that causes an abnormal urine color can alter the accuracy of the chemical reactions.        Appearance, UA Turbid     Comment: Result checked         pH, UA 6.0     Specific Gravity, UA 1.021     Glucose, UA Negative     Ketones, UA Trace     Bilirubin, UA Small (1+)     Comment: Confirmation testing is unavailable.  A serum bilirubin is recommended for further assessment.        Blood, UA Large (3+)     Protein,  mg/dL (2+)     Leuk Esterase, UA Small (1+)     Nitrite, UA Negative     Urobilinogen, UA 0.2 E.U./dL    Comprehensive Metabolic Panel [[id number]]  (Abnormal) Collected: 03/19/22 1046    Specimen: Blood Updated: 03/19/22 1115     Glucose 123 mg/dL      BUN 11 mg/dL      Creatinine 0.65 mg/dL      Sodium 139 mmol/L      Potassium 3.8 mmol/L      Chloride 105 mmol/L      CO2 17.0 mmol/L      Calcium 9.0 mg/dL      Total Protein 7.5 g/dL      Albumin 3.90 g/dL      ALT (SGPT) 37 U/L       AST (SGOT) 31 U/L      Alkaline Phosphatase 68 U/L      Total Bilirubin 0.5 mg/dL      Globulin 3.6 gm/dL      A/G Ratio 1.1 g/dL      BUN/Creatinine Ratio 16.9     Anion Gap 17.0 mmol/L      eGFR 133.3 mL/min/1.73      Comment: National Kidney Foundation and American Society of Nephrology (ASN) Task Force recommended calculation based on the Chronic Kidney Disease Epidemiology Collaboration (CKD-EPI) equation refit without adjustment for race.       Narrative:      GFR Normal >60  Chronic Kidney Disease <60  Kidney Failure <15      Lipase [[id number]]  (Normal) Collected: 03/19/22 1046    Specimen: Blood Updated: 03/19/22 1115     Lipase 17 U/L     CBC & Differential [[id number]]  (Abnormal) Collected: 03/19/22 1046    Specimen: Blood Updated: 03/19/22 1052    Narrative:      The following orders were created for panel order CBC & Differential.  Procedure                               Abnormality         Status                     ---------                               -----------         ------                     CBC Auto Differential[[id number]]        Abnormal            Final result                 Please view results for these tests on the individual orders.    CBC Auto Differential [[id number]]  (Abnormal) Collected: 03/19/22 1046    Specimen: Blood Updated: 03/19/22 1052     WBC 12.00 10*3/mm3      RBC 4.73 10*6/mm3      Hemoglobin 13.0 g/dL      Hematocrit 39.3 %      MCV 83.1 fL      MCH 27.4 pg      MCHC 33.0 g/dL      RDW 13.9 %      RDW-SD 41.1 fl      MPV 6.6 fL      Platelets 463 10*3/mm3      Neutrophil % 85.0 %      Lymphocyte % 10.7 %      Monocyte % 3.7 %      Eosinophil % 0.4 %      Basophil % 0.2 %      Neutrophils, Absolute 10.20 10*3/mm3      Lymphocytes, Absolute 1.30 10*3/mm3      Monocytes, Absolute 0.40 10*3/mm3      Eosinophils, Absolute 0.00 10*3/mm3      Basophils, Absolute 0.00 10*3/mm3      nRBC 0.0 /100 WBC           Assessment:  Eft ureteral stone    Plan:    D/c home on flomax ,  keflex and pain meds  My office will irwin to schedule follow up this week to seee urology in [place] office with a repeat ct    Carlos Grider MD  3/20/2022

## 2022-03-20 NOTE — PLAN OF CARE
Problem: Adult Inpatient Plan of Care  Goal: Plan of Care Review  Outcome: Met  Goal: Patient-Specific Goal (Individualized)  Outcome: Met  Goal: Absence of Hospital-Acquired Illness or Injury  Outcome: Met  Intervention: Identify and Manage Fall Risk  Recent Flowsheet Documentation  Taken 3/20/2022 1118 by Sera Kearney RN  Safety Promotion/Fall Prevention: safety round/check completed  Taken 3/20/2022 0919 by Sera Kearney RN  Safety Promotion/Fall Prevention:   safety round/check completed   assistive device/personal items within reach   clutter free environment maintained  Taken 3/20/2022 0700 by Sera Kearney RN  Safety Promotion/Fall Prevention: safety round/check completed  Intervention: Prevent Skin Injury  Recent Flowsheet Documentation  Taken 3/20/2022 0919 by Sera Kearney RN  Body Position:   position changed independently   supine  Skin Protection: adhesive use limited  Intervention: Prevent and Manage VTE (Venous Thromboembolism) Risk  Recent Flowsheet Documentation  Taken 3/20/2022 0919 by Sera Kearney RN  Activity Management:   activity adjusted per tolerance   up ad hood   ambulated to bathroom  Intervention: Prevent Infection  Recent Flowsheet Documentation  Taken 3/20/2022 1118 by Sera Kearney RN  Infection Prevention:   rest/sleep promoted   single patient room provided  Taken 3/20/2022 0919 by Sera Kearney RN  Infection Prevention:   rest/sleep promoted   single patient room provided  Taken 3/20/2022 0700 by Sera Kearney RN  Infection Prevention:   single patient room provided   rest/sleep promoted  Goal: Optimal Comfort and Wellbeing  Outcome: Met  Intervention: Provide Person-Centered Care  Recent Flowsheet Documentation  Taken 3/20/2022 0919 by Sera Kearney RN  Trust Relationship/Rapport:   choices provided   care explained   questions answered   questions encouraged  Goal: Readiness for Transition of Care  Outcome: Met   Goal Outcome Evaluation:

## 2022-03-20 NOTE — PLAN OF CARE
Problem: Adult Inpatient Plan of Care  Goal: Plan of Care Review  Outcome: Ongoing, Progressing  Goal: Patient-Specific Goal (Individualized)  Outcome: Ongoing, Progressing  Goal: Absence of Hospital-Acquired Illness or Injury  Outcome: Ongoing, Progressing  Intervention: Identify and Manage Fall Risk  Recent Flowsheet Documentation  Taken 3/20/2022 0100 by Su Hugo RN  Safety Promotion/Fall Prevention: safety round/check completed  Taken 3/20/2022 0000 by Su Hugo RN  Safety Promotion/Fall Prevention: safety round/check completed  Taken 3/19/2022 2300 by Su Hugo RN  Safety Promotion/Fall Prevention: safety round/check completed  Taken 3/19/2022 2100 by Su Hugo RN  Safety Promotion/Fall Prevention: safety round/check completed  Taken 3/19/2022 1938 by Su Hugo RN  Safety Promotion/Fall Prevention: safety round/check completed  Taken 3/19/2022 1900 by Su Hugo RN  Safety Promotion/Fall Prevention: safety round/check completed  Intervention: Prevent Skin Injury  Recent Flowsheet Documentation  Taken 3/19/2022 1938 by Su Hugo RN  Body Position:   position changed independently   sitting up in bed  Goal: Optimal Comfort and Wellbeing  Outcome: Ongoing, Progressing  Goal: Readiness for Transition of Care  Outcome: Ongoing, Progressing     Problem: Pain Acute  Goal: Acceptable Pain Control and Functional Ability  Outcome: Ongoing, Progressing   Goal Outcome Evaluation:

## 2022-03-20 NOTE — DISCHARGE INSTRUCTIONS
Take Flomax 0.4 mg as previously prescribed until follow-up with urology and instructed to discontinue this medication.  During our conversation prior to discharge you indicated that you had plenty of this medication at home so a new prescription was not written at discharge.

## 2022-03-21 ENCOUNTER — TRANSITIONAL CARE MANAGEMENT TELEPHONE ENCOUNTER (OUTPATIENT)
Dept: CALL CENTER | Facility: HOSPITAL | Age: 27
End: 2022-03-21

## 2022-03-21 NOTE — OUTREACH NOTE
Call Center TCM Note    Flowsheet Row Responses   [facility] patient discharged from? [facility]   Does the patient have one of the following disease processes/diagnoses(primary or secondary)? Other   TCM attempt successful? No          Lilly Garzon LPN    3/21/2022, 13:46 EDT

## 2022-03-26 DIAGNOSIS — IMO0001 HORMONAL IMBALANCE IN TRANSGENDER PATIENT: ICD-10-CM

## 2022-03-28 RX ORDER — PROGESTERONE 100 MG/1
CAPSULE ORAL
Qty: 90 CAPSULE | Refills: 1 | Status: SHIPPED | OUTPATIENT
Start: 2022-03-28

## 2022-04-05 ENCOUNTER — TELEPHONE (OUTPATIENT)
Dept: FAMILY MEDICINE CLINIC | Facility: CLINIC | Age: 27
End: 2022-04-05

## 2022-04-05 NOTE — TELEPHONE ENCOUNTER
This is a Dr. Jason patient.    Last OV: 2/23/2021  Next OV: none scheduled (overdue x1 year)  Last refill: 9/9/2021

## 2022-04-05 NOTE — TELEPHONE ENCOUNTER
Caller: Amos Powell    Relationship: Self    Best call back number: [phone]    Requested Prescriptions:   Requested Prescriptions      No prescriptions requested or ordered in this encounter      estradiol valerate (DELESTROGEN) 20 MG/ML injection    Pharmacy where request should be sent: [facility] #[id number] [facility] [address] [facility] AT [facility] [phone] [facility] [phone]      Additional details provided by patient: PATIENT IS OUT.    Does the patient have less than a 3 day supply:  [x] Yes  [] No    Yuri Herron Rep   04/05/22 13:37 EDT

## 2022-04-11 ENCOUNTER — TELEPHONE (OUTPATIENT)
Dept: FAMILY MEDICINE CLINIC | Facility: CLINIC | Age: 27
End: 2022-04-11

## 2022-04-11 DIAGNOSIS — F64.0 GENDER DYSPHORIA IN ADULT: ICD-10-CM

## 2022-04-11 RX ORDER — ESTRADIOL VALERATE 20 MG/ML
4 INJECTION INTRAMUSCULAR 2 TIMES WEEKLY
Qty: 5 ML | Refills: 3 | Status: SHIPPED | OUTPATIENT
Start: 2022-04-11 | End: 2022-08-15 | Stop reason: SDUPTHER

## 2022-04-11 NOTE — TELEPHONE ENCOUNTER
Caller: Amos Powell    Relationship: Self    Best call back number: [phone]    Requested Prescriptions:   estradiol valerate (DELESTROGEN) 20 MG/ML injection  Pharmacy where request should be sent: [facility]/PHARMACY #[id number] - [address] [phone] [facility] [phone] FX     Additional details provided by patient:   WILL BE OUT BY THE END OF THE WEEK.     [facility] WOULD NOT LET [facility] REFILL WITH A DIAGNOSIS.     Does the patient have less than a 3 day supply:  [x] Yes  [] No    Yuri Roth Rep   04/11/22 12:33 EDT

## 2022-04-11 NOTE — TELEPHONE ENCOUNTER
Last seen 2/23/2021        Do you want patient to be schedule an appointment since it's been over a year?

## 2022-08-15 DIAGNOSIS — F64.0 GENDER DYSPHORIA IN ADULT: ICD-10-CM

## 2022-08-15 RX ORDER — ESTRADIOL VALERATE 20 MG/ML
4 INJECTION INTRAMUSCULAR 2 TIMES WEEKLY
Qty: 5 ML | Refills: 3 | Status: SHIPPED | OUTPATIENT
Start: 2022-08-15 | End: 2022-12-06 | Stop reason: SDUPTHER

## 2022-12-06 DIAGNOSIS — F64.0 GENDER DYSPHORIA IN ADULT: ICD-10-CM

## 2022-12-06 RX ORDER — ESTRADIOL VALERATE 20 MG/ML
4 INJECTION INTRAMUSCULAR 2 TIMES WEEKLY
Qty: 5 ML | Refills: 3 | Status: SHIPPED | OUTPATIENT
Start: 2022-12-08 | End: 2023-03-06 | Stop reason: SDUPTHER

## 2022-12-06 NOTE — TELEPHONE ENCOUNTER
Caller: Amos Powell    Relationship: Self    Best call back number: [phone]    Requested Prescriptions:   Requested Prescriptions     Pending Prescriptions Disp Refills   • estradiol valerate (DELESTROGEN) 20 MG/ML injection 5 mL 3     Sig: Inject 0.2 mL into the appropriate muscle as directed by prescriber 2 (Two) Times a Week.        Pharmacy where request should be sent: [facility] #[id number] [place] [address] AT [facility] - [phone]  - [phone] FX     Additional details provided by patient: REQUESTING AN EMERGENCY SCRIPT UNTIL 12/29 OFFICE VISIT    Does the patient have less than a 3 day supply:  [x] Yes  [] No      Yuri Echols Rep   12/06/22 14:10 EST

## 2022-12-29 ENCOUNTER — OFFICE VISIT (OUTPATIENT)
Dept: FAMILY MEDICINE CLINIC | Facility: CLINIC | Age: 27
End: 2022-12-29

## 2022-12-29 VITALS
OXYGEN SATURATION: 97 % | BODY MASS INDEX: 48.44 KG/M2 | DIASTOLIC BLOOD PRESSURE: 82 MMHG | SYSTOLIC BLOOD PRESSURE: 132 MMHG | HEART RATE: 101 BPM | WEIGHT: 315 LBS | RESPIRATION RATE: 20 BRPM

## 2022-12-29 DIAGNOSIS — E34.9 HORMONE IMBALANCE: ICD-10-CM

## 2022-12-29 DIAGNOSIS — Z23 NEED FOR VACCINATION: ICD-10-CM

## 2022-12-29 DIAGNOSIS — Z11.59 ENCOUNTER FOR HEPATITIS C SCREENING TEST FOR LOW RISK PATIENT: ICD-10-CM

## 2022-12-29 DIAGNOSIS — F41.8 DEPRESSION WITH ANXIETY: Chronic | ICD-10-CM

## 2022-12-29 DIAGNOSIS — Z00.00 ROUTINE HEALTH MAINTENANCE: Primary | ICD-10-CM

## 2022-12-29 DIAGNOSIS — F64.0 GENDER DYSPHORIA IN ADULT: ICD-10-CM

## 2022-12-29 DIAGNOSIS — G93.2 PSEUDOTUMOR CEREBRI: ICD-10-CM

## 2022-12-29 DIAGNOSIS — Z51.81 THERAPEUTIC DRUG MONITORING: ICD-10-CM

## 2022-12-29 PROCEDURE — 90471 IMMUNIZATION ADMIN: CPT | Performed by: FAMILY MEDICINE

## 2022-12-29 PROCEDURE — 99213 OFFICE O/P EST LOW 20 MIN: CPT | Performed by: FAMILY MEDICINE

## 2022-12-29 PROCEDURE — 91312 COVID-19 (PFIZER) BIVALENT BOOSTER 12+YRS: CPT | Performed by: FAMILY MEDICINE

## 2022-12-29 PROCEDURE — 99395 PREV VISIT EST AGE 18-39: CPT | Performed by: FAMILY MEDICINE

## 2022-12-29 PROCEDURE — 0124A COVID-19 (PFIZER) BIVALENT BOOSTER 12+YRS: CPT | Performed by: FAMILY MEDICINE

## 2022-12-29 PROCEDURE — 2014F MENTAL STATUS ASSESS: CPT | Performed by: FAMILY MEDICINE

## 2022-12-29 PROCEDURE — 90686 IIV4 VACC NO PRSV 0.5 ML IM: CPT | Performed by: FAMILY MEDICINE

## 2022-12-29 PROCEDURE — 3008F BODY MASS INDEX DOCD: CPT | Performed by: FAMILY MEDICINE

## 2022-12-29 RX ORDER — ACETAZOLAMIDE 500 MG/1
500 CAPSULE, EXTENDED RELEASE ORAL DAILY
Qty: 90 CAPSULE | Refills: 0 | Status: SHIPPED | OUTPATIENT
Start: 2022-12-29 | End: 2023-01-05

## 2022-12-29 NOTE — PROGRESS NOTES
Chief Complaint  Annual Exam    Subjective    History of Present Illness {CC  Problem List  Visit  Diagnosis   Encounters  Notes  Medications  Labs  Result Review Imaging  Media :23}     Amos Powell presents to [facility] for Annual Exam.  History of Present Illness     Here today for annual exam and to discuss preventive health maintenance. Also due for follow-up on gender affirming hormone therapy.    Doing fairly well overall. Feels that her health is good and she is overall stable. Requesting refills of a couple medications today. Continues on acetazolamide for management of pseudotumor cerebri. Reports good adherence and tolerance and appropriate symptom management. Also remains on sertraline for management of anxiety and depression. Similarly reports good adherence and tolerance and symptom management.    Happy with the overall state of her transition. Remains on injectable estradiol and oral progesterone. Due for check of labs today.    Also due for a flu shot and COVID booster today. Otherwise fairly well caught up on preventive health recommendations.    Diet and exercise are overall stable. Weight is overall stable as well. Continues to work on slow improvements.    Has good family and social support. Enjoys her work. Gets regular dental checks.    Objective     Vital Signs:   /82   Pulse 101   Resp 20   Wt (!) 149 kg (328 lb)   SpO2 97%   BMI 48.44 kg/m²   Physical Exam  Vitals and nursing note reviewed.   Constitutional:       General: He is not in acute distress.     Appearance: Normal appearance. He is not ill-appearing.   Cardiovascular:      Rate and Rhythm: Normal rate and regular rhythm.      Pulses: Normal pulses.      Heart sounds: Normal heart sounds. No murmur heard.  Pulmonary:      Effort: Pulmonary effort is normal. No respiratory distress.      Breath sounds: Normal breath sounds. No rales.   Neurological:      Mental Status: He is alert  and oriented to person, place, and time. Mental status is at baseline.   Psychiatric:         Mood and Affect: Mood normal.         Behavior: Behavior normal.          Result Review  Data Reviewed:{ Labs  Result Review  Imaging  Med Tab  Media :23}                   Assessment and Plan {CC Problem List  Visit Diagnosis  ROS  Review (Popup)  Health Maintenance  Quality  BestPractice  Medications  SmartSets  SnapShot Encounters  Media :23}   Diagnoses and all orders for this visit:    1. Routine health maintenance (Primary)    2. Gender dysphoria in adult  -     Estradiol  -     Estrone  -     Testosterone  -     Lipid Panel With LDL / HDL Ratio  -     Comprehensive Metabolic Panel  -     Progesterone    3. Hormone imbalance  -     Estradiol  -     Estrone  -     Testosterone  -     Lipid Panel With LDL / HDL Ratio  -     Comprehensive Metabolic Panel  -     Progesterone    4. Depression with anxiety  -     sertraline (ZOLOFT) 50 MG tablet; Take 1 tablet by mouth Daily for 180 days.  Dispense: 90 tablet; Refill: 1    5. Pseudotumor cerebri  -     acetaZOLAMIDE (DIAMOX) 500 MG capsule; Take 1 capsule by mouth Daily.  Dispense: 90 capsule; Refill: 0    6. Therapeutic drug monitoring  -     Estradiol  -     Estrone  -     Testosterone  -     Lipid Panel With LDL / HDL Ratio  -     Comprehensive Metabolic Panel  -     Progesterone    7. Encounter for hepatitis C screening test for low risk patient  -     Hepatitis C Antibody    8. Need for vaccination  -     FluLaval/Fluzone >6 mos (4508-0973)  -     COVID-19 Bivalent Booster (Pfizer) 12+yrs    Orders as above. I will contact her with lab results as available. Continue regimen as prescribed.    Vaccines as requested. Encouraged to continue working on healthy lifestyle habits.    Recommended follow-up as below. Encouraged communication via [employer] in the meantime.    Patient was given instructions and counseling regarding his condition or for health  maintenance advice. Please see specific information pulled into the AVS (placed there by myself) if appropriate.    Return in about 6 months (around 6/29/2023), or if symptoms worsen or fail to improve, for f/u gender-affirming hormone therapy.      XIMENA Jason MD    Prevention counseling performed as below: Mindfulness for stress management.

## 2022-12-30 LAB
ALBUMIN SERPL-MCNC: 4.2 G/DL (ref 4.1–5.2)
ALBUMIN/GLOB SERPL: 1.5 {RATIO} (ref 1.2–2.2)
ALP SERPL-CCNC: 61 IU/L (ref 44–121)
ALT SERPL-CCNC: 23 IU/L (ref 0–44)
AST SERPL-CCNC: 19 IU/L (ref 0–40)
BILIRUB SERPL-MCNC: 0.3 MG/DL (ref 0–1.2)
BUN SERPL-MCNC: 11 MG/DL (ref 6–20)
BUN/CREAT SERPL: 17 (ref 9–20)
CALCIUM SERPL-MCNC: 9.3 MG/DL (ref 8.7–10.2)
CHLORIDE SERPL-SCNC: 108 MMOL/L (ref 96–106)
CHOLEST SERPL-MCNC: 138 MG/DL (ref 100–199)
CO2 SERPL-SCNC: 19 MMOL/L (ref 20–29)
CREAT SERPL-MCNC: 0.63 MG/DL (ref 0.76–1.27)
EGFRCR SERPLBLD CKD-EPI 2021: 134 ML/MIN/1.73
ESTRADIOL SERPL-MCNC: 466 PG/ML (ref 7.6–42.6)
GLOBULIN SER CALC-MCNC: 2.8 G/DL (ref 1.5–4.5)
GLUCOSE SERPL-MCNC: 82 MG/DL (ref 70–99)
HCV AB S/CO SERPL IA: 0.1 S/CO RATIO (ref 0–0.9)
HDLC SERPL-MCNC: 41 MG/DL
LDLC SERPL CALC-MCNC: 63 MG/DL (ref 0–99)
LDLC/HDLC SERPL: 1.5 RATIO (ref 0–3.6)
POTASSIUM SERPL-SCNC: 4 MMOL/L (ref 3.5–5.2)
PROGEST SERPL-MCNC: 0.3 NG/ML (ref 0–0.5)
PROT SERPL-MCNC: 7 G/DL (ref 6–8.5)
SODIUM SERPL-SCNC: 140 MMOL/L (ref 134–144)
TESTOST SERPL-MCNC: 5 NG/DL (ref 264–916)
TRIGL SERPL-MCNC: 208 MG/DL (ref 0–149)
VLDLC SERPL CALC-MCNC: 34 MG/DL (ref 5–40)

## 2023-01-03 LAB — ESTRONE SERPL-MCNC: 200 PG/ML (ref 0–174)

## 2023-01-05 DIAGNOSIS — G93.2 PSEUDOTUMOR CEREBRI: ICD-10-CM

## 2023-01-05 RX ORDER — ACETAZOLAMIDE 500 MG/1
500 CAPSULE, EXTENDED RELEASE ORAL DAILY
Qty: 90 CAPSULE | Refills: 0 | Status: SHIPPED | OUTPATIENT
Start: 2023-01-05

## 2023-02-13 RX ORDER — PANTOPRAZOLE SODIUM 20 MG/1
20 TABLET, DELAYED RELEASE ORAL DAILY
COMMUNITY
End: 2023-02-13 | Stop reason: SDUPTHER

## 2023-02-13 RX ORDER — PANTOPRAZOLE SODIUM 20 MG/1
20 TABLET, DELAYED RELEASE ORAL DAILY
Qty: 30 TABLET | Refills: 0 | Status: SHIPPED | OUTPATIENT
Start: 2023-02-13 | End: 2023-03-14

## 2023-02-13 NOTE — TELEPHONE ENCOUNTER
Caller: Amos Powell    Relationship: Self    Best call back number: [phone]    Requested Prescriptions:   Requested Prescriptions      No prescriptions requested or ordered in this encounter      PATIENT IS REQUESTING A REFILL OF PANTOPRAZOLE, NOT LISTED ON MED LIST     Pharmacy where request should be sent: [id number] [place], [place] - [address] AT [facility] - [phone]  - [phone] FX     Additional details provided by patient: PATIENT IS OUT OF MEDICATION.    Does the patient have less than a 3 day supply:  [x] Yes  [] No    Would you like a call back once the refill request has been completed: [] Yes [x] No    If the office needs to give you a call back, can they leave a voicemail: [] Yes [x] No    Yuri Herron Rep   02/13/23 14:41 EST

## 2023-03-06 ENCOUNTER — TELEPHONE (OUTPATIENT)
Dept: FAMILY MEDICINE CLINIC | Facility: CLINIC | Age: 28
End: 2023-03-06
Payer: MEDICAID

## 2023-03-06 DIAGNOSIS — F64.0 GENDER DYSPHORIA IN ADULT: ICD-10-CM

## 2023-03-06 RX ORDER — ESTRADIOL VALERATE 20 MG/ML
4 INJECTION INTRAMUSCULAR 2 TIMES WEEKLY
Qty: 5 ML | Refills: 3 | Status: SHIPPED | OUTPATIENT
Start: 2023-03-06

## 2023-03-06 NOTE — TELEPHONE ENCOUNTER
Caller: Amos Powell    Relationship: Self     Best call back number: [phone]    Requested Prescriptions:   Requested Prescriptions     Pending Prescriptions Disp Refills   • estradiol valerate (DELESTROGEN) 20 MG/ML injection 5 mL 3     Sig: Inject 0.2 mL into the appropriate muscle as directed by prescriber 2 (Two) Times a Week.        Pharmacy where request should be sent:   [place] [address] [place] [address] ·   [phone]  Additional details provided by patient:    Does the patient have less than a 3 day supply:  [] Yes  [] No    Would you like a call back once the refill request has been completed: [] Yes [] No    If the office needs to give you a call back, can they leave a voicemail: [] Yes [] No    Yuri Washington Rep   03/06/23 12:26 EST

## 2023-03-14 RX ORDER — PANTOPRAZOLE SODIUM 20 MG/1
20 TABLET, DELAYED RELEASE ORAL DAILY
Qty: 30 TABLET | Refills: 0 | Status: SHIPPED | OUTPATIENT
Start: 2023-03-14

## 2023-04-06 DIAGNOSIS — F41.8 DEPRESSION WITH ANXIETY: Chronic | ICD-10-CM

## 2023-04-06 NOTE — TELEPHONE ENCOUNTER
"Caller: Amos Powell \"[place]\"    Relationship: Self    Best call back number: [phone]  Requested Prescriptions:   Requested Prescriptions     Pending Prescriptions Disp Refills   • sertraline (ZOLOFT) 50 MG tablet 90 tablet 1     Sig: Take 1 tablet by mouth Daily for 180 days.        Pharmacy where request should be sent: [facility]ARMACY #[id number] - [facility] [address] AT [place] [facility] [phone] [facility] [phone]      Last office visit with prescribing clinician: 12/29/2022   Last telemedicine visit with prescribing clinician: 6/29/2023   Next office visit with prescribing clinician: 6/29/2023     Additional details provided by patient:NO    Does the patient have less than a 3 day supply:  [x] Yes  [] No    Would you like a call back once the refill request has been completed: [x] Yes [] No    If the office needs to give you a call back, can they leave a voicemail: [x] Yes [] No    Yuri New Rep   04/06/23 13:17 EDT       "

## 2023-04-29 ENCOUNTER — HOSPITAL ENCOUNTER (EMERGENCY)
Facility: HOSPITAL | Age: 28
Discharge: HOME | End: 2023-04-29
Attending: EMERGENCY MEDICINE
Payer: MEDICAID

## 2023-04-29 ENCOUNTER — APPOINTMENT (OUTPATIENT)
Dept: CT IMAGING | Facility: HOSPITAL | Age: 28
End: 2023-04-29
Payer: MEDICAID

## 2023-04-29 VITALS
SYSTOLIC BLOOD PRESSURE: 154 MMHG | HEART RATE: 98 BPM | DIASTOLIC BLOOD PRESSURE: 87 MMHG | WEIGHT: 315 LBS | BODY MASS INDEX: 46.65 KG/M2 | RESPIRATION RATE: 18 BRPM | OXYGEN SATURATION: 97 % | TEMPERATURE: 97.2 F | HEIGHT: 69 IN

## 2023-04-29 DIAGNOSIS — N20.1 URETEROLITHIASIS: Primary | ICD-10-CM

## 2023-04-29 LAB
ALBUMIN SERPL-MCNC: 4.1 G/DL (ref 3.5–5.2)
ALBUMIN/GLOB SERPL: 1.2 G/DL
ALP SERPL-CCNC: 69 U/L (ref 39–117)
ALT SERPL W P-5'-P-CCNC: 20 U/L (ref 1–41)
ANION GAP SERPL CALCULATED.3IONS-SCNC: 13 MMOL/L (ref 5–15)
AST SERPL-CCNC: 23 U/L (ref 1–40)
BACTERIA UR QL AUTO: ABNORMAL /HPF
BASOPHILS # BLD AUTO: 0 10*3/MM3 (ref 0–0.2)
BASOPHILS NFR BLD AUTO: 0.3 % (ref 0–1.5)
BILIRUB SERPL-MCNC: 0.5 MG/DL (ref 0–1.2)
BILIRUB UR QL STRIP: ABNORMAL
BUN SERPL-MCNC: 12 MG/DL (ref 6–20)
BUN/CREAT SERPL: 16.4 (ref 7–25)
CALCIUM SPEC-SCNC: 8.9 MG/DL (ref 8.6–10.5)
CHLORIDE SERPL-SCNC: 108 MMOL/L (ref 98–107)
CLARITY UR: ABNORMAL
CO2 SERPL-SCNC: 18 MMOL/L (ref 22–29)
COD CRY URNS QL: ABNORMAL /HPF
COLOR UR: ABNORMAL
CREAT SERPL-MCNC: 0.73 MG/DL (ref 0.76–1.27)
DEPRECATED RDW RBC AUTO: 46.8 FL (ref 37–54)
EGFRCR SERPLBLD CKD-EPI 2021: 127.9 ML/MIN/1.73
EOSINOPHIL # BLD AUTO: 0.1 10*3/MM3 (ref 0–0.4)
EOSINOPHIL NFR BLD AUTO: 0.4 % (ref 0.3–6.2)
ERYTHROCYTE [DISTWIDTH] IN BLOOD BY AUTOMATED COUNT: 15.1 % (ref 12.3–15.4)
GLOBULIN UR ELPH-MCNC: 3.4 GM/DL
GLUCOSE SERPL-MCNC: 120 MG/DL (ref 65–99)
GLUCOSE UR STRIP-MCNC: NEGATIVE MG/DL
HCT VFR BLD AUTO: 39.6 % (ref 37.5–51)
HGB BLD-MCNC: 13 G/DL (ref 13–17.7)
HGB UR QL STRIP.AUTO: ABNORMAL
HYALINE CASTS UR QL AUTO: ABNORMAL /LPF
KETONES UR QL STRIP: ABNORMAL
LEUKOCYTE ESTERASE UR QL STRIP.AUTO: ABNORMAL
LIPASE SERPL-CCNC: 23 U/L (ref 13–60)
LYMPHOCYTES # BLD AUTO: 1.3 10*3/MM3 (ref 0.7–3.1)
LYMPHOCYTES NFR BLD AUTO: 10.5 % (ref 19.6–45.3)
MCH RBC QN AUTO: 27.4 PG (ref 26.6–33)
MCHC RBC AUTO-ENTMCNC: 32.8 G/DL (ref 31.5–35.7)
MCV RBC AUTO: 83.5 FL (ref 79–97)
MONOCYTES # BLD AUTO: 0.5 10*3/MM3 (ref 0.1–0.9)
MONOCYTES NFR BLD AUTO: 3.8 % (ref 5–12)
MUCOUS THREADS URNS QL MICRO: ABNORMAL /HPF
NEUTROPHILS NFR BLD AUTO: 10.4 10*3/MM3 (ref 1.7–7)
NEUTROPHILS NFR BLD AUTO: 85 % (ref 42.7–76)
NITRITE UR QL STRIP: NEGATIVE
NRBC BLD AUTO-RTO: 0.1 /100 WBC (ref 0–0.2)
PH UR STRIP.AUTO: <=5 [PH] (ref 5–8)
PLATELET # BLD AUTO: 471 10*3/MM3 (ref 140–450)
PMV BLD AUTO: 6.8 FL (ref 6–12)
POTASSIUM SERPL-SCNC: 3.9 MMOL/L (ref 3.5–5.2)
PROT SERPL-MCNC: 7.5 G/DL (ref 6–8.5)
PROT UR QL STRIP: ABNORMAL
RBC # BLD AUTO: 4.74 10*6/MM3 (ref 4.14–5.8)
RBC # UR STRIP: ABNORMAL /HPF
REF LAB TEST METHOD: ABNORMAL
SODIUM SERPL-SCNC: 139 MMOL/L (ref 136–145)
SP GR UR STRIP: 1.02 (ref 1–1.03)
SQUAMOUS #/AREA URNS HPF: ABNORMAL /HPF
UROBILINOGEN UR QL STRIP: ABNORMAL
WBC # UR STRIP: ABNORMAL /HPF
WBC NRBC COR # BLD: 12.2 10*3/MM3 (ref 3.4–10.8)

## 2023-04-29 PROCEDURE — 96376 TX/PRO/DX INJ SAME DRUG ADON: CPT

## 2023-04-29 PROCEDURE — 81001 URINALYSIS AUTO W/SCOPE: CPT | Performed by: EMERGENCY MEDICINE

## 2023-04-29 PROCEDURE — 99283 EMERGENCY DEPT VISIT LOW MDM: CPT

## 2023-04-29 PROCEDURE — 83690 ASSAY OF LIPASE: CPT | Performed by: EMERGENCY MEDICINE

## 2023-04-29 PROCEDURE — 74176 CT ABD & PELVIS W/O CONTRAST: CPT

## 2023-04-29 PROCEDURE — 36415 COLL VENOUS BLD VENIPUNCTURE: CPT

## 2023-04-29 PROCEDURE — 25010000002 KETOROLAC TROMETHAMINE PER 15 MG: Performed by: EMERGENCY MEDICINE

## 2023-04-29 PROCEDURE — 96375 TX/PRO/DX INJ NEW DRUG ADDON: CPT

## 2023-04-29 PROCEDURE — 85025 COMPLETE CBC W/AUTO DIFF WBC: CPT | Performed by: EMERGENCY MEDICINE

## 2023-04-29 PROCEDURE — 80053 COMPREHEN METABOLIC PANEL: CPT | Performed by: EMERGENCY MEDICINE

## 2023-04-29 PROCEDURE — 25010000002 ONDANSETRON PER 1 MG: Performed by: EMERGENCY MEDICINE

## 2023-04-29 PROCEDURE — 96374 THER/PROPH/DIAG INJ IV PUSH: CPT

## 2023-04-29 RX ORDER — ONDANSETRON 4 MG/1
4 TABLET, ORALLY DISINTEGRATING ORAL EVERY 8 HOURS PRN
Qty: 12 TABLET | Refills: 0 | Status: SHIPPED | OUTPATIENT
Start: 2023-04-29

## 2023-04-29 RX ORDER — KETOROLAC TROMETHAMINE 15 MG/ML
15 INJECTION, SOLUTION INTRAMUSCULAR; INTRAVENOUS ONCE
Status: COMPLETED | OUTPATIENT
Start: 2023-04-29 | End: 2023-04-29

## 2023-04-29 RX ORDER — OXYCODONE HYDROCHLORIDE AND ACETAMINOPHEN 5; 325 MG/1; MG/1
1 TABLET ORAL EVERY 6 HOURS PRN
Qty: 12 TABLET | Refills: 0 | Status: SHIPPED | OUTPATIENT
Start: 2023-04-29

## 2023-04-29 RX ORDER — TAMSULOSIN HYDROCHLORIDE 0.4 MG/1
1 CAPSULE ORAL DAILY
Qty: 30 CAPSULE | Refills: 0 | Status: SHIPPED | OUTPATIENT
Start: 2023-04-29

## 2023-04-29 RX ORDER — IBUPROFEN 600 MG/1
600 TABLET ORAL EVERY 6 HOURS PRN
Qty: 20 TABLET | Refills: 0 | Status: SHIPPED | OUTPATIENT
Start: 2023-04-29

## 2023-04-29 RX ORDER — ONDANSETRON 2 MG/ML
4 INJECTION INTRAMUSCULAR; INTRAVENOUS ONCE
Status: COMPLETED | OUTPATIENT
Start: 2023-04-29 | End: 2023-04-29

## 2023-04-29 RX ORDER — SODIUM CHLORIDE 0.9 % (FLUSH) 0.9 %
10 SYRINGE (ML) INJECTION AS NEEDED
Status: DISCONTINUED | OUTPATIENT
Start: 2023-04-29 | End: 2023-04-29 | Stop reason: HOSPADM

## 2023-04-29 RX ADMIN — SODIUM CHLORIDE 1000 ML: 9 INJECTION, SOLUTION INTRAVENOUS at 10:33

## 2023-04-29 RX ADMIN — KETOROLAC TROMETHAMINE 15 MG: 15 INJECTION, SOLUTION INTRAMUSCULAR; INTRAVENOUS at 10:35

## 2023-04-29 RX ADMIN — ONDANSETRON 4 MG: 2 INJECTION INTRAMUSCULAR; INTRAVENOUS at 10:35

## 2023-04-29 RX ADMIN — KETOROLAC TROMETHAMINE 15 MG: 15 INJECTION, SOLUTION INTRAMUSCULAR; INTRAVENOUS at 11:28

## 2023-04-29 NOTE — ED PROVIDER NOTES
Subjective   History of Present Illness  27-year-old with history of kidney stones and follows with Dr. Hester presents for right flank pain.  Started this morning.  Has had some nausea and vomiting.  No fevers.  No dysuria.  No gross hematuria.  Feels like previous kidney stones.  States has been about a year since last 1.    Review of Systems  Positive for flank pain, nausea and vomiting.  Negative for fever, chills, dysuria, gross hematuria.  Past Medical History:   Diagnosis Date   • Constipation due to opioid therapy 06/07/2016   • Depression with anxiety 01/08/2020   • Gastroesophageal reflux disease without esophagitis 05/21/2020   • Gender dysphoria    • Headache    • Hormonal imbalance in transgender patient 01/08/2020   • Kidney calculi    • Obesity, Class III, BMI 40-49.9 (morbid obesity) 08/04/2020       No Known Allergies    Past Surgical History:   Procedure Laterality Date   • CYSTOSCOPY BLADDER STONE LITHOTRIPSY     • CYSTOSCOPY W/ URETERAL STENT PLACEMENT Left 11/06/2021    Procedure: CYSTOSCOPY, LEFT URETEROSCOPY, RETROGRADE PYELOGRAM, LEFT STENT INSERTION;  Surgeon: Jayden Cox MD;  Location: [facility];  Service: Urology;  Laterality: Left;   • CYSTOSCOPY, URETEROSCOPY, RETROGRADE PYELOGRAM, STENT INSERTION Right 08/04/2020    Procedure: CYSTOSCOPY WITH RIGHT URETERAL STENT PLACEMENT;  Surgeon: Delano Hester MD;  Location: [facility] OR;  Service: Urology;  Laterality: Right;   • SPINE SURGERY     • WISDOM TOOTH EXTRACTION Bilateral     top and bottom       Family History   Problem Relation Age of Onset   • Hypertension Maternal Grandmother    • Diabetes type II Paternal Grandfather        Social History     Socioeconomic History   • Marital status: Single   Tobacco Use   • Smoking status: Never   • Smokeless tobacco: Never   Substance and Sexual Activity   • Alcohol use: Yes     Alcohol/week: 1.0 standard drink     Types: 1 Cans of beer per week     Comment: 1 a month   •  "Drug use: Not Currently     Types: Marijuana     Comment: rarely   • Sexual activity: Yes     Partners: Female, Male     Birth control/protection: Condom           Objective   Physical Exam  Constitutional:  No acute distress.  Head:  Atraumatic.  Normocephalic.   Eyes:  No scleral icterus. Normal conjunctivae  ENT:  Moist mucosa.  No nasal discharge present.  Cardiovascular:  Well perfused.  Equal pulses.  Regular rhythm and normal  rate.  Normal capillary refill.    Pulmonary/Chest:  No respiratory distress.  Airway patent.  No tachypnea.  No accessory muscle usage.    Abdominal:  Nondistended. Nontender.  Elevated BMI.  No rebound or guarding.  Back: No CVA tenderness to palpation.  Extremities:  No peripheral edema.  No Deformity  Skin:  Warm, dry  Neurological:  Alert, awake, and appropriate.  Normal speech.      Procedures           ED Course      /87 (BP Location: Left arm, Patient Position: Sitting)   Pulse 98   Temp 97.2 °F (36.2 °C) (Oral)   Resp 18   Ht 175.3 cm (69\")   Wt (!) 148 kg (326 lb 4.5 oz)   SpO2 97%   BMI 48.18 kg/m²   Labs Reviewed   COMPREHENSIVE METABOLIC PANEL - Abnormal; Notable for the following components:       Result Value    Glucose 120 (*)     Creatinine 0.73 (*)     Chloride 108 (*)     CO2 18.0 (*)     All other components within normal limits    Narrative:     GFR Normal >60  Chronic Kidney Disease <60  Kidney Failure <15     URINALYSIS W/ MICROSCOPIC IF INDICATED (NO CULTURE) - Abnormal; Notable for the following components:    Color, UA Nydia (*)     Appearance, UA Cloudy (*)     Ketones, UA Trace (*)     Bilirubin, UA Small (1+) (*)     Blood, UA Large (3+) (*)     Protein,  mg/dL (2+) (*)     Leuk Esterase, UA Small (1+) (*)     All other components within normal limits   CBC WITH AUTO DIFFERENTIAL - Abnormal; Notable for the following components:    WBC 12.20 (*)     Platelets 471 (*)     Neutrophil % 85.0 (*)     Lymphocyte % 10.5 (*)     Monocyte % 3.8 " (*)     Neutrophils, Absolute 10.40 (*)     All other components within normal limits   URINALYSIS, MICROSCOPIC ONLY - Abnormal; Notable for the following components:    RBC, UA Too Numerous to Count (*)     WBC, UA 0-2 (*)     Bacteria, UA 2+ (*)     Mucus, UA Moderate/2+ (*)     All other components within normal limits   LIPASE - Normal   CBC AND DIFFERENTIAL    Narrative:     The following orders were created for panel order CBC & Differential.  Procedure                               Abnormality         Status                     ---------                               -----------         ------                     CBC Auto Differential[[id number]]        Abnormal            Final result                 Please view results for these tests on the individual orders.     Medications   sodium chloride 0.9 % bolus 1,000 mL (0 mL Intravenous Stopped 4/29/23 1340)   ketorolac (TORADOL) injection 15 mg (15 mg Intravenous Given 4/29/23 1035)   ondansetron (ZOFRAN) injection 4 mg (4 mg Intravenous Given 4/29/23 1035)   ketorolac (TORADOL) injection 15 mg (15 mg Intravenous Given 4/29/23 1128)     CT Abdomen Pelvis Without Contrast    Result Date: 4/29/2023  1.A stone measuring 4 mm is observed in the proximal right ureter. There is mild obstructive uropathy with evidence for mild hydroureter and hydronephrosis on the right. Right renal edema and perinephric fat stranding are also noted. 2.Nonobstructive stones within the left kidney measuring up to approximately 2 mm. There is no hydronephrosis or hydroureter on the left. Electronically Signed: Venkata Hall  4/29/2023 11:50 AM EDT  Workstation ID: [id number]                                         MDM  Patient with mildly elevated white count.  Has hydro on CT with some stranding but patient without evidence of pyelonephritis or urinary tract infection.  No dysuria.  No CVA tenderness to palpation and check for this multiple times.  No fevers here.  Mildly elevated white  count but suspect this is secondary to vomiting.  Will DC with close urology follow-up.  Return ER precautions discussed in great detail with patient.  Final diagnoses:   Ureterolithiasis       ED Disposition  ED Disposition     ED Disposition   Discharge    Condition   Stable    Comment   --             No follow-up provider specified.       Medication List      New Prescriptions    ibuprofen 600 MG tablet  Commonly known as: ADVIL,MOTRIN  Take 1 tablet by mouth Every 6 (Six) Hours As Needed for Mild Pain.     ondansetron ODT 4 MG disintegrating tablet  Commonly known as: ZOFRAN-ODT  Place 1 tablet on the tongue Every 8 (Eight) Hours As Needed for Nausea or Vomiting.     oxyCODONE-acetaminophen 5-325 MG per tablet  Commonly known as: PERCOCET  Take 1 tablet by mouth Every 6 (Six) Hours As Needed for Severe Pain.     tamsulosin 0.4 MG capsule 24 hr capsule  Commonly known as: FLOMAX  Take 1 capsule by mouth Daily.           Where to Get Your Medications      These medications were sent to [facility]/pharmacy #[id number] - [place], [place] - [address] AT [facility] - [phone] [facility] [phone]   [address], [place] [address]    Hours: 24-hours Phone: [phone]   · ibuprofen 600 MG tablet  · ondansetron ODT 4 MG disintegrating tablet  · oxyCODONE-acetaminophen 5-325 MG per tablet  · tamsulosin 0.4 MG capsule 24 hr capsule          Jayden Koch MD  04/29/23 3348

## 2023-05-11 DIAGNOSIS — G93.2 PSEUDOTUMOR CEREBRI: ICD-10-CM

## 2023-05-11 RX ORDER — ACETAZOLAMIDE 500 MG/1
500 CAPSULE, EXTENDED RELEASE ORAL DAILY
Qty: 90 CAPSULE | Refills: 0 | Status: SHIPPED | OUTPATIENT
Start: 2023-05-11

## 2023-05-11 NOTE — TELEPHONE ENCOUNTER
"    Caller: Amos Powell \"[place]\"    Relationship: Self    Best call back number: [phone]     Requested Prescriptions:    acetaZOLAMIDE (DIAMOX) 500 MG capsule       Requested Prescriptions      No prescriptions requested or ordered in this encounter        Pharmacy where request should be sent: [facility]ACY #[id number] [place], [place] - [address] AT [place] [facility] - [phone] [facility] [phone]      Last office visit with prescribing clinician: 12/29/2022   Last telemedicine visit with prescribing clinician: 4/6/2023   Next office visit with prescribing clinician: 6/29/2023     Additional details provided by patient: PATIENT STATES COMPLETELY OUT OF THE MEDICATION.    Does the patient have less than a 3 day supply:  [x] Yes  [] No    Would you like a call back once the refill request has been completed: [x] Yes [] No    If the office needs to give you a call back, can they leave a voicemail: [x] Yes [] No    Yuri Benitez Rep   05/11/23 12:17 EDT     PLEASE ADVISE.          "

## 2023-06-19 DIAGNOSIS — F64.0 GENDER DYSPHORIA IN ADULT: ICD-10-CM

## 2023-06-19 RX ORDER — ESTRADIOL VALERATE 20 MG/ML
4 INJECTION INTRAMUSCULAR 2 TIMES WEEKLY
Qty: 5 ML | Refills: 3 | Status: SHIPPED | OUTPATIENT
Start: 2023-06-19

## 2023-06-19 NOTE — TELEPHONE ENCOUNTER
"Caller: Amos Powell \"[place]\"    Relationship: Self    Best call back number: [phone]    Requested Prescriptions:   Requested Prescriptions     Pending Prescriptions Disp Refills    estradiol valerate (DELESTROGEN) 20 MG/ML injection 5 mL 3     Sig: Inject 0.2 mL into the appropriate muscle as directed by prescriber 2 (Two) Times a Week.        Pharmacy where request should be sent: [facility]Y #[id number] - [address] [phone] [facility] [phone]      Last office visit with prescribing clinician: 12/29/2022   Last telemedicine visit with prescribing clinician: Visit date not found   Next office visit with prescribing clinician: 6/29/2023     Additional details provided by patient: PATIENT IS COMPLETLEY OUT OF THIS MEDICATION     Does the patient have less than a 3 day supply:  [x] Yes  [] No    Would you like a call back once the refill request has been completed: [x] Yes [] No    If the office needs to give you a call back, can they leave a voicemail: [x] Yes [] No    Haley Leung, IZABEL   06/19/23 13:42 EDT     "

## 2023-06-19 NOTE — TELEPHONE ENCOUNTER
Last office visit: 12/29/22    Next office visit: 6/29/23    Last refill: 3/6/23    Last lab: 12/29/22

## 2023-08-02 ENCOUNTER — OFFICE VISIT (OUTPATIENT)
Dept: FAMILY MEDICINE CLINIC | Facility: CLINIC | Age: 28
End: 2023-08-02
Payer: MEDICAID

## 2023-08-02 VITALS
HEART RATE: 86 BPM | BODY MASS INDEX: 46.65 KG/M2 | RESPIRATION RATE: 18 BRPM | SYSTOLIC BLOOD PRESSURE: 146 MMHG | OXYGEN SATURATION: 98 % | HEIGHT: 69 IN | WEIGHT: 315 LBS | DIASTOLIC BLOOD PRESSURE: 100 MMHG

## 2023-08-02 DIAGNOSIS — F41.8 DEPRESSION WITH ANXIETY: Primary | Chronic | ICD-10-CM

## 2023-08-02 PROBLEM — E34.9 HORMONE IMBALANCE: Status: ACTIVE | Noted: 2023-08-02

## 2023-08-02 PROBLEM — F64.0 GENDER DYSPHORIA IN ADULT: Status: ACTIVE | Noted: 2020-01-08

## 2023-08-02 PROCEDURE — 1159F MED LIST DOCD IN RCRD: CPT | Performed by: FAMILY MEDICINE

## 2023-08-02 PROCEDURE — 1160F RVW MEDS BY RX/DR IN RCRD: CPT | Performed by: FAMILY MEDICINE

## 2023-08-02 PROCEDURE — 99213 OFFICE O/P EST LOW 20 MIN: CPT | Performed by: FAMILY MEDICINE

## 2023-08-02 RX ORDER — BICALUTAMIDE 50 MG/1
TABLET, FILM COATED ORAL DAILY
COMMUNITY

## 2023-08-02 RX ORDER — BUPROPION HYDROCHLORIDE 150 MG/1
150 TABLET ORAL DAILY
Qty: 90 TABLET | Refills: 0 | Status: SHIPPED | OUTPATIENT
Start: 2023-08-02

## 2023-08-08 DIAGNOSIS — G93.2 PSEUDOTUMOR CEREBRI: ICD-10-CM

## 2023-08-08 RX ORDER — ACETAZOLAMIDE 500 MG/1
CAPSULE, EXTENDED RELEASE ORAL
Qty: 90 CAPSULE | Refills: 0 | Status: SHIPPED | OUTPATIENT
Start: 2023-08-08

## 2023-08-28 ENCOUNTER — TELEPHONE (OUTPATIENT)
Dept: FAMILY MEDICINE CLINIC | Facility: CLINIC | Age: 28
End: 2023-08-28

## 2023-08-28 DIAGNOSIS — G93.2 PSEUDOTUMOR CEREBRI: ICD-10-CM

## 2023-08-28 NOTE — TELEPHONE ENCOUNTER
"Called and asked what she needed amoxicillin for and pt reported \"ear infection\". I told her she would need to be seen before medication can be sent in, pt declined an appt.   "

## 2023-08-28 NOTE — TELEPHONE ENCOUNTER
"  Caller: Amos Powell \"[place]\"    Relationship: Self    Best call back number: [phone]     Requested Prescriptions:   Requested Prescriptions     Pending Prescriptions Disp Refills    acetaZOLAMIDE (DIAMOX) 500 MG capsule 90 capsule 0     Sig: Take 1 capsule by mouth Daily.        Pharmacy where request should be sent: [facility]ARMACY #[id number] - [facility] [place] [address] [phone] [facility] [phone] FX     Last office visit with prescribing clinician: 8/2/2023   Last telemedicine visit with prescribing clinician: Visit date not found   Next office visit with prescribing clinician: 10/5/2023     Additional details provided by patient:     Does the patient have less than a 3 day supply:  [x] Yes  [] No    Would you like a call back once the refill request has been completed: [] Yes [] No    If the office needs to give you a call back, can they leave a voicemail: [] Yes [] No    Yuri English   08/28/23 14:02 EDT         "

## 2023-08-29 RX ORDER — ACETAZOLAMIDE 500 MG/1
500 CAPSULE, EXTENDED RELEASE ORAL DAILY
Qty: 90 CAPSULE | Refills: 0 | Status: SHIPPED | OUTPATIENT
Start: 2023-08-29

## 2023-10-05 ENCOUNTER — OFFICE VISIT (OUTPATIENT)
Dept: FAMILY MEDICINE CLINIC | Facility: CLINIC | Age: 28
End: 2023-10-05
Payer: MEDICAID

## 2023-10-05 VITALS
DIASTOLIC BLOOD PRESSURE: 96 MMHG | OXYGEN SATURATION: 97 % | HEART RATE: 97 BPM | WEIGHT: 315 LBS | HEIGHT: 69 IN | BODY MASS INDEX: 46.65 KG/M2 | SYSTOLIC BLOOD PRESSURE: 138 MMHG | RESPIRATION RATE: 19 BRPM

## 2023-10-05 DIAGNOSIS — E34.9 HORMONE IMBALANCE: ICD-10-CM

## 2023-10-05 DIAGNOSIS — Z23 NEED FOR VACCINATION: ICD-10-CM

## 2023-10-05 DIAGNOSIS — F41.8 DEPRESSION WITH ANXIETY: Primary | ICD-10-CM

## 2023-10-05 DIAGNOSIS — F64.0 GENDER DYSPHORIA IN ADULT: ICD-10-CM

## 2023-10-05 DIAGNOSIS — Z51.81 THERAPEUTIC DRUG MONITORING: ICD-10-CM

## 2023-10-05 RX ORDER — PROGESTERONE 100 MG/1
100 CAPSULE ORAL DAILY
Qty: 90 CAPSULE | Refills: 3 | Status: SHIPPED | OUTPATIENT
Start: 2023-10-05

## 2023-10-05 RX ORDER — ESCITALOPRAM OXALATE 10 MG/1
10 TABLET ORAL DAILY
Qty: 90 TABLET | Refills: 1 | Status: SHIPPED | OUTPATIENT
Start: 2023-10-05

## 2023-10-05 NOTE — PROGRESS NOTES
"Chief Complaint  Depression (2 month f/u)    Subjective    History of Present Illness {CC  Problem List  Visit  Diagnosis   Encounters  Notes  Medications  Labs  Result Review Imaging  Media :23}     Amos Powell presents to [facility] for Depression (2 month f/u).  History of Present Illness     Here today for f/u as above. Has now been off of sertraline and on bupropion for over a month. Feels like moods have been much more labile on the bupropion with increased feelings of depression. Felt that she was more stable on the sertraline previously. Would like to consider other meds.    Due for recheck of hormone labs today. Is interested in having her DHT level checked. Admits this is mostly a curiosity. Continues on estradiol and progesterone as prescribed. No problems with self-administration, no injection site reactions. Needs a refill of the progesterone today.    Due for flu shot, would like to get one today.    Objective     Vital Signs:   /96   Pulse 97   Resp 19   Ht 175.3 cm (69\")   Wt (!) 144 kg (318 lb)   SpO2 97%   BMI 46.96 kg/m²   Physical Exam  Vitals and nursing note reviewed.   Constitutional:       General: She is not in acute distress.     Appearance: Normal appearance. She is not ill-appearing.   Cardiovascular:      Rate and Rhythm: Normal rate and regular rhythm.      Pulses: Normal pulses.      Heart sounds: Normal heart sounds. No murmur heard.  Pulmonary:      Effort: Pulmonary effort is normal. No respiratory distress.      Breath sounds: Normal breath sounds. No rales.   Neurological:      Mental Status: She is alert and oriented to person, place, and time. Mental status is at baseline.   Psychiatric:         Mood and Affect: Mood normal.         Behavior: Behavior normal.        Result Review  Data Reviewed:{ Labs  Result Review  Imaging  Med Tab  Media :23}                   Assessment and Plan {CC Problem List  Visit Diagnosis  " ROS  Review (Popup)  Health Maintenance  Quality  BestPractice  Medications  SmartSets  SnapShot Encounters  Media :23}   Diagnoses and all orders for this visit:    1. Depression with anxiety (Primary)  -     escitalopram (LEXAPRO) 10 MG tablet; Take 1 tablet by mouth Daily.  Dispense: 90 tablet; Refill: 1    2. Gender dysphoria in adult  -     Estradiol  -     Estrone  -     Testosterone  -     Lipid Panel With LDL / HDL Ratio  -     Comprehensive Metabolic Panel  -     Dihydrotestosterone  -     Progesterone (PROMETRIUM) 100 MG capsule; Take 1 capsule by mouth Daily.  Dispense: 90 capsule; Refill: 3    3. Hormone imbalance  -     Estradiol  -     Estrone  -     Testosterone  -     Lipid Panel With LDL / HDL Ratio  -     Comprehensive Metabolic Panel  -     Dihydrotestosterone    4. Therapeutic drug monitoring  -     Estradiol  -     Estrone  -     Testosterone  -     Lipid Panel With LDL / HDL Ratio  -     Comprehensive Metabolic Panel  -     Dihydrotestosterone    5. Need for vaccination  -     Fluzone (or Fluarix & Flulaval for VFC) >6 Mos (1890-2814)    Long discussion about management of depression and anxiety moving forward. Decided to try escitalopram as above. Discussed anticipated effects and potential side effects. Discussed self titration. She will keep me updated with her progress.    I will contact her with lab results as available. Continue regimen as prescribed.    Vaccine as requested.    Recommended follow-up as below. Encouraged communication via [employer] in the meantime.    Patient was given instructions and counseling regarding her condition or for health maintenance advice. Please see specific information pulled into the AVS (placed there by myself) if appropriate.    Return in about 2 months (around 12/5/2023), or if symptoms worsen or fail to improve, for Follow-up depression.      XIMENA Jason MD

## 2023-10-06 LAB
ALBUMIN SERPL-MCNC: 4.5 G/DL (ref 4.3–5.2)
ALBUMIN/GLOB SERPL: 1.4 {RATIO} (ref 1.2–2.2)
ALP SERPL-CCNC: 77 IU/L (ref 44–121)
ALT SERPL-CCNC: 20 IU/L (ref 0–44)
AST SERPL-CCNC: 20 IU/L (ref 0–40)
BILIRUB SERPL-MCNC: 0.6 MG/DL (ref 0–1.2)
BUN SERPL-MCNC: 10 MG/DL (ref 6–20)
BUN/CREAT SERPL: 15 (ref 9–20)
CALCIUM SERPL-MCNC: 9.9 MG/DL (ref 8.7–10.2)
CHLORIDE SERPL-SCNC: 104 MMOL/L (ref 96–106)
CHOLEST SERPL-MCNC: 145 MG/DL (ref 100–199)
CO2 SERPL-SCNC: 19 MMOL/L (ref 20–29)
CREAT SERPL-MCNC: 0.65 MG/DL (ref 0.76–1.27)
EGFRCR SERPLBLD CKD-EPI 2021: 132 ML/MIN/1.73
ESTRADIOL SERPL-MCNC: 337 PG/ML (ref 7.6–42.6)
GLOBULIN SER CALC-MCNC: 3.2 G/DL (ref 1.5–4.5)
GLUCOSE SERPL-MCNC: 82 MG/DL (ref 70–99)
HDLC SERPL-MCNC: 45 MG/DL
LDLC SERPL CALC-MCNC: 69 MG/DL (ref 0–99)
LDLC/HDLC SERPL: 1.5 RATIO (ref 0–3.6)
POTASSIUM SERPL-SCNC: 4.2 MMOL/L (ref 3.5–5.2)
PROT SERPL-MCNC: 7.7 G/DL (ref 6–8.5)
SODIUM SERPL-SCNC: 140 MMOL/L (ref 134–144)
TESTOST SERPL-MCNC: 16 NG/DL (ref 264–916)
TRIGL SERPL-MCNC: 184 MG/DL (ref 0–149)
VLDLC SERPL CALC-MCNC: 31 MG/DL (ref 5–40)

## 2023-10-09 LAB — ESTRONE SERPL-MCNC: 141 PG/ML (ref 0–174)

## 2023-10-12 LAB — ANDROSTANOLONE SERPL-MCNC: 3.8 NG/DL

## 2023-10-16 DIAGNOSIS — F64.0 GENDER DYSPHORIA IN ADULT: ICD-10-CM

## 2023-10-16 RX ORDER — ESTRADIOL VALERATE 20 MG/ML
4 INJECTION INTRAMUSCULAR 2 TIMES WEEKLY
Qty: 5 ML | Refills: 3 | Status: SHIPPED | OUTPATIENT
Start: 2023-10-16

## 2023-10-16 NOTE — TELEPHONE ENCOUNTER
"    Caller: King Amos \"[place]\"    Relationship: Self    Best call back number: [phone]     Requested Prescriptions:   Requested Prescriptions     Pending Prescriptions Disp Refills    estradiol valerate (DELESTROGEN) 20 MG/ML injection 5 mL 3     Sig: Inject 0.2 mL into the appropriate muscle as directed by prescriber 2 (Two) Times a Week. Appointment needed for refills.        Pharmacy where request should be sent: [facility]/PHARMACY #[id number] - [address] [phone] [facility] [phone]      Last office visit with prescribing clinician: 10/5/2023   Last telemedicine visit with prescribing clinician: Visit date not found   Next office visit with prescribing clinician: Visit date not found     Does the patient have less than a 3 day supply:  [x] Yes  [] No    Would you like a call back once the refill request has been completed: [] Yes [x] No    If the office needs to give you a call back, can they leave a voicemail: [] Yes [x] No    Yuri Bates Rep   10/16/23 11:50 EDT         "

## 2023-10-31 DIAGNOSIS — F41.8 DEPRESSION WITH ANXIETY: Chronic | ICD-10-CM

## 2023-10-31 RX ORDER — BUPROPION HYDROCHLORIDE 150 MG/1
150 TABLET ORAL DAILY
Qty: 90 TABLET | Refills: 0 | Status: SHIPPED | OUTPATIENT
Start: 2023-10-31

## 2023-11-26 DIAGNOSIS — G93.2 PSEUDOTUMOR CEREBRI: ICD-10-CM

## 2023-11-27 RX ORDER — ACETAZOLAMIDE 500 MG/1
500 CAPSULE, EXTENDED RELEASE ORAL DAILY
Qty: 90 CAPSULE | Refills: 0 | Status: SHIPPED | OUTPATIENT
Start: 2023-11-27

## 2024-01-17 DIAGNOSIS — F64.0 GENDER DYSPHORIA IN ADULT: ICD-10-CM

## 2024-01-17 DIAGNOSIS — F41.8 DEPRESSION WITH ANXIETY: Chronic | ICD-10-CM

## 2024-01-17 RX ORDER — BUPROPION HYDROCHLORIDE 150 MG/1
150 TABLET ORAL DAILY
Qty: 90 TABLET | Refills: 0 | Status: SHIPPED | OUTPATIENT
Start: 2024-01-17

## 2024-01-17 RX ORDER — ESTRADIOL VALERATE 20 MG/ML
4 INJECTION INTRAMUSCULAR 2 TIMES WEEKLY
Qty: 5 ML | Refills: 3 | Status: SHIPPED | OUTPATIENT
Start: 2024-01-18

## 2024-01-17 NOTE — TELEPHONE ENCOUNTER
"Caller: Amos Powell \"[place]\"    Relationship: Self    Best call back number:[phone]     Requested Prescriptions:   Requested Prescriptions     Pending Prescriptions Disp Refills    estradiol valerate (DELESTROGEN) 20 MG/ML injection 5 mL 3     Sig: Inject 0.2 mL into the appropriate muscle as directed by prescriber 2 (Two) Times a Week. Appointment needed for refills.    buPROPion XL (WELLBUTRIN XL) 150 MG 24 hr tablet 90 tablet 0     Sig: Take 1 tablet by mouth Daily.        Pharmacy where request should be sent: [facility]/PHARMACY #[id number] - [address] [phone] [facility] [phone]      Last office visit with prescribing clinician: 10/5/2023   Last telemedicine visit with prescribing clinician: Visit date not found   Next office visit with prescribing clinician: Visit date not found         Does the patient have less than a 3 day supply:  [x] Yes  [] No    Would you like a call back once the refill request has been completed: [] Yes [x] No    If the office needs to give you a call back, can they leave a voicemail: [] Yes [x] No    Yuri Meyer Rep   01/17/24 12:43 EST       "

## 2024-01-25 ENCOUNTER — TELEMEDICINE (OUTPATIENT)
Dept: FAMILY MEDICINE CLINIC | Facility: CLINIC | Age: 29
End: 2024-01-25
Payer: MEDICAID

## 2024-01-25 DIAGNOSIS — H60.393 OTHER INFECTIVE ACUTE OTITIS EXTERNA OF BOTH EARS: ICD-10-CM

## 2024-01-25 PROCEDURE — 99213 OFFICE O/P EST LOW 20 MIN: CPT | Performed by: FAMILY MEDICINE

## 2024-01-25 RX ORDER — CIPROFLOXACIN AND DEXAMETHASONE 3; 1 MG/ML; MG/ML
4 SUSPENSION/ DROPS AURICULAR (OTIC) 2 TIMES DAILY
Qty: 7.5 ML | Refills: 0 | Status: SHIPPED | OUTPATIENT
Start: 2024-01-25

## 2024-01-25 NOTE — PROGRESS NOTES
Mode of Visit: Video  Location of patient: home  You have chosen to receive care through a telehealth visit.  Does the patient consent to use a video/audio connection for your medical care today? Yes  The visit included audio and video interaction. No technical issues occurred during this visit.     Chief Complaint  Otitis Externa    Subjective    History of Present Illness {CC  Problem List  Visit  Diagnosis   Encounters  Notes  Medications  Labs  Result Review Imaging  Media :23}     Amos Powell presents to [facility] for Otitis Externa.  History of Present Illness     Visit today for management of recurrent bilateral otitis media. Has a long history of the same. Seeing ENT for further eval and management but doesn't have f/u for another two months.     Objective     Vital Signs:   There were no vitals taken for this visit.  Physical Exam  Constitutional:       General: She is not in acute distress.     Appearance: Normal appearance. She is not ill-appearing.   Eyes:      Extraocular Movements: Extraocular movements intact.      Pupils: Pupils are equal, round, and reactive to light.   Pulmonary:      Effort: Pulmonary effort is normal.   Musculoskeletal:      Cervical back: Normal range of motion.   Neurological:      Mental Status: She is alert and oriented to person, place, and time. Mental status is at baseline.   Psychiatric:         Mood and Affect: Mood normal.         Behavior: Behavior normal.          Result Review  Data Reviewed:{ Labs  Result Review  Imaging  Med Tab  Media :23}                   Assessment and Plan {CC Problem List  Visit Diagnosis  ROS  Review (Popup)  Health Maintenance  Quality  BestPractice  Medications  SmartSets  SnapShot Encounters  Media :23}   Diagnoses and all orders for this visit:    1. Other infective acute otitis externa of both ears  -     ciprofloxacin-dexAMETHasone (Ciprodex) 0.3-0.1 % otic suspension;  Administer 4 drops into both ears 2 (Two) Times a Day.  Dispense: 7.5 mL; Refill: 0    Will treat as above. Urged her to call her ENT to see if she can get in sooner. She will keep me updated with any changes to her symptoms.    Recommended follow-up as below. Encouraged communication via [employer] in the meantime.    Patient was given instructions and counseling regarding her condition or for health maintenance advice. Please see specific information pulled into the AVS (placed there by myself) if appropriate.    Return if symptoms worsen or fail to improve.      XIMENA Jason MD

## 2024-02-20 DIAGNOSIS — G93.2 PSEUDOTUMOR CEREBRI: ICD-10-CM

## 2024-02-20 RX ORDER — ACETAZOLAMIDE 500 MG/1
500 CAPSULE, EXTENDED RELEASE ORAL DAILY
Qty: 30 CAPSULE | Refills: 0 | Status: SHIPPED | OUTPATIENT
Start: 2024-02-20

## 2024-03-11 ENCOUNTER — OFFICE VISIT (OUTPATIENT)
Dept: FAMILY MEDICINE CLINIC | Facility: CLINIC | Age: 29
End: 2024-03-11
Payer: MEDICAID

## 2024-03-11 VITALS
BODY MASS INDEX: 46.65 KG/M2 | RESPIRATION RATE: 18 BRPM | DIASTOLIC BLOOD PRESSURE: 80 MMHG | HEART RATE: 94 BPM | HEIGHT: 69 IN | WEIGHT: 315 LBS | OXYGEN SATURATION: 97 % | SYSTOLIC BLOOD PRESSURE: 128 MMHG

## 2024-03-11 DIAGNOSIS — M54.9 CHRONIC NECK AND BACK PAIN: Primary | ICD-10-CM

## 2024-03-11 DIAGNOSIS — M54.2 CHRONIC NECK AND BACK PAIN: Primary | ICD-10-CM

## 2024-03-11 DIAGNOSIS — M62.830 BACK MUSCLE SPASM: ICD-10-CM

## 2024-03-11 DIAGNOSIS — G89.29 CHRONIC NECK AND BACK PAIN: Primary | ICD-10-CM

## 2024-03-11 DIAGNOSIS — G44.229 CHRONIC TENSION-TYPE HEADACHE, NOT INTRACTABLE: ICD-10-CM

## 2024-03-11 PROCEDURE — 1159F MED LIST DOCD IN RCRD: CPT | Performed by: FAMILY MEDICINE

## 2024-03-11 PROCEDURE — 1160F RVW MEDS BY RX/DR IN RCRD: CPT | Performed by: FAMILY MEDICINE

## 2024-03-11 PROCEDURE — 99213 OFFICE O/P EST LOW 20 MIN: CPT | Performed by: FAMILY MEDICINE

## 2024-03-11 NOTE — PROGRESS NOTES
"Chief Complaint  Migraine    Subjective    History of Present Illness    Amos Powell presents to [facility] for Migraine.  History of Present Illness     Here today with complaint of ongoing headache. Has a fair amount of pain in the upper back and posterior neck, feels like a tight  at times, radiating into the posterior head. Headache feels like a band around her head, slowly tightening. Has never been single-sided, no associated nausea or vomiting, no light sensitivity though she occasionally has some sound sensitivity. Has been trying to do some stretching of her upper back and neck with occasional mild relief. Over-the-counter NSAIDs offer some relief as well.    Objective     Vital Signs:   /80   Pulse 94   Resp 18   Ht 175.3 cm (69\")   Wt (!) 144 kg (317 lb)   SpO2 97%   BMI 46.81 kg/m²   Physical Exam  Vitals reviewed.   Constitutional:       General: She is not in acute distress.     Appearance: Normal appearance. She is not ill-appearing.   Musculoskeletal:      Comments: Tender across the upper back and posterior neck. Pressure over the levator scapula and trapezius recreate some of the headache pain.   Neurological:      Mental Status: She is alert.                        Assessment and Plan   Diagnoses and all orders for this visit:    1. Chronic neck and back pain (Primary)  -     Ambulatory Referral to Physical Therapy    2. Back muscle spasm  -     Ambulatory Referral to Physical Therapy    3. Chronic tension-type headache, not intractable  -     Ambulatory Referral to Physical Therapy    Referral to physical therapy for further evaluation. I do think that she will find some relief relatively soon with some exercises and loosening up of the tense musculature. Encouraged ongoing over-the-counter NSAIDs as needed. She will keep me updated with her progress.    Recommended follow up as below. Encouraged communication via [employer] in the meantime.     Patient " was given instructions and counseling regarding her condition or for health maintenance advice. Please see specific information pulled into the AVS (placed there by myself) if appropriate.    Return in about 3 months (around 6/11/2024), or if symptoms worsen or fail to improve, for Preventive Health Maintenance.    XIMENA Jason MD

## 2024-04-17 DIAGNOSIS — F41.8 DEPRESSION WITH ANXIETY: Chronic | ICD-10-CM

## 2024-04-17 RX ORDER — BUPROPION HYDROCHLORIDE 150 MG/1
150 TABLET ORAL DAILY
Qty: 90 TABLET | Refills: 0 | Status: SHIPPED | OUTPATIENT
Start: 2024-04-17

## 2024-04-24 DIAGNOSIS — G93.2 PSEUDOTUMOR CEREBRI: ICD-10-CM

## 2024-04-24 RX ORDER — ACETAZOLAMIDE 500 MG/1
500 CAPSULE, EXTENDED RELEASE ORAL DAILY
Qty: 30 CAPSULE | Refills: 0 | Status: SHIPPED | OUTPATIENT
Start: 2024-04-24

## 2024-04-24 NOTE — TELEPHONE ENCOUNTER
"  Caller: Amos Powell \"[place]\"    Relationship: Self    Requested Prescriptions:   Requested Prescriptions     Pending Prescriptions Disp Refills    acetaZOLAMIDE (DIAMOX) 500 MG capsule 30 capsule 0     Sig: Take 1 capsule by mouth Daily.        Pharmacy where request should be sent: [facility]/PHARMACY #[id number] - [place], [place] [address] [phone] [facility] [phone] FX     Last office visit with prescribing clinician: 3/11/2024   Last telemedicine visit with prescribing clinician: 1/25/2024   Next office visit with prescribing clinician: Visit date not found     Additional details provided by patient: LESS THAN 3 DAYS LEFT.     Does the patient have less than a 3 day supply:  [x] Yes  [] No    Would you like a call back once the refill request has been completed: [] Yes [x] No    If the office needs to give you a call back, can they leave a voicemail: [] Yes [x] No    Yuri Vo Rep   04/24/24 14:59 EDT     "

## 2024-05-24 DIAGNOSIS — G93.2 PSEUDOTUMOR CEREBRI: ICD-10-CM

## 2024-05-24 DIAGNOSIS — F41.8 DEPRESSION WITH ANXIETY: Chronic | ICD-10-CM

## 2024-05-28 RX ORDER — ACETAZOLAMIDE 500 MG/1
500 CAPSULE, EXTENDED RELEASE ORAL DAILY
Qty: 30 CAPSULE | Refills: 0 | Status: SHIPPED | OUTPATIENT
Start: 2024-05-28

## 2024-05-28 RX ORDER — BUPROPION HYDROCHLORIDE 150 MG/1
150 TABLET ORAL DAILY
Qty: 90 TABLET | Refills: 0 | Status: SHIPPED | OUTPATIENT
Start: 2024-05-28

## 2024-05-29 ENCOUNTER — TELEMEDICINE (OUTPATIENT)
Dept: FAMILY MEDICINE CLINIC | Facility: TELEHEALTH | Age: 29
End: 2024-05-29
Payer: MEDICAID

## 2024-05-29 DIAGNOSIS — H60.502 ACUTE OTITIS EXTERNA OF LEFT EAR, UNSPECIFIED TYPE: Primary | ICD-10-CM

## 2024-05-29 NOTE — PROGRESS NOTES
"You have chosen to receive care through a telehealth visit.  Do you consent to use a video/audio connection for your medical care today? Yes     GRISEL Powell is a 29 y.o. adult  presents with complaint of left ear pain that she thinks is a \"swimmers ear\" that started last week. She reports no fever or ear drainage. She is taking tylenol for pain. She reports a h/o frequent external ear infections. She has not been swimming but wears ear phones. She reports a h/o swimmers ear and has a pending ENT appointment.     Review of Systems   HENT:  Positive for ear pain. Negative for congestion, ear discharge, hearing loss, postnasal drip, rhinorrhea, sinus pressure, sinus pain, sore throat and voice change.    Respiratory: Negative.     Cardiovascular: Negative.    Neurological: Negative.    Hematological: Negative.    Psychiatric/Behavioral: Negative.         Past Medical History:   Diagnosis Date    Constipation due to opioid therapy 06/07/2016    Depression with anxiety 01/08/2020    Gastroesophageal reflux disease without esophagitis 05/21/2020    Gender dysphoria     Headache     Hormonal imbalance in transgender patient 01/08/2020    Kidney calculi     Obesity, Class III, BMI 40-49.9 (morbid obesity) 08/04/2020       Family History   Problem Relation Age of Onset    Hypertension Maternal Grandmother     Diabetes type II Paternal Grandfather        Social History     Socioeconomic History    Marital status: Single   Tobacco Use    Smoking status: Never    Smokeless tobacco: Never   Vaping Use    Vaping status: Never Used   Substance and Sexual Activity    Alcohol use: Yes     Alcohol/week: 1.0 standard drink of alcohol     Types: 1 Cans of beer per week     Comment: 1 a month    Drug use: Yes     Types: Marijuana     Comment: rarely    Sexual activity: Yes     Partners: Female, Male     Birth control/protection: Condom         There were no vitals taken for this visit.    PHYSICAL EXAM  Physical Exam "   Constitutional: She appears well-developed and well-nourished. She does not have a sickly appearance. She does not appear ill. No distress.   HENT:   Head: Normocephalic and atraumatic.   Right Ear: Hearing and external ear normal. No drainage. No decreased hearing is noted.   Left Ear: Hearing and external ear normal. There is tenderness. No drainage or swelling. No mastoid tenderness. No decreased hearing is noted. no left ear laceration(s) noted.  Nose: Nose normal.   Pulmonary/Chest: Effort normal.  No respiratory distress. She no audible wheeze...  Neurological: She is alert.   Psychiatric: She has a normal mood and affect.   Vitals reviewed.      Diagnoses and all orders for this visit:    1. Acute otitis externa of left ear, unspecified type (Primary)  -     neomycin-polymyxin-hydrocortisone (CORTISPORIN) 3.5-12894-8 otic solution; Administer 3 drops into the left ear 4 (Four) Times a Day.  Dispense: 10 mL; Refill: 0    Patient has a pending ENT appointment  Warm heat to left ear prn pain  IBU as directed prn pain.       FOLLOW-UP  As discussed during visit with [facility], if symptoms worsen or fail to improve, follow-up with PCP/Urgent Care/Emergency Department.    Patient verbalizes understanding of medications, instructions for treatment and follow-up.    Karin Devlin, APRN  05/29/2024  09:38 EDT    The use of a video visit has been reviewed with the patient and verbal informed consent has been obtained. Myself and Amos Powell participated in this visit. The patient is located in [place], [place], and I am located in [place], [place]. [employer]  were utilized.

## 2024-07-10 DIAGNOSIS — F64.0 GENDER DYSPHORIA IN ADULT: ICD-10-CM

## 2024-07-10 RX ORDER — ESTRADIOL VALERATE 20 MG/ML
4 INJECTION INTRAMUSCULAR 2 TIMES WEEKLY
Qty: 5 ML | Refills: 3 | Status: SHIPPED | OUTPATIENT
Start: 2024-07-11

## 2024-07-25 DIAGNOSIS — G93.2 PSEUDOTUMOR CEREBRI: ICD-10-CM

## 2024-07-25 RX ORDER — ACETAZOLAMIDE 500 MG/1
500 CAPSULE, EXTENDED RELEASE ORAL DAILY
Qty: 30 CAPSULE | Refills: 0 | Status: SHIPPED | OUTPATIENT
Start: 2024-07-25

## 2024-09-05 DIAGNOSIS — H60.502 ACUTE OTITIS EXTERNA OF LEFT EAR, UNSPECIFIED TYPE: ICD-10-CM

## 2024-09-05 DIAGNOSIS — G93.2 PSEUDOTUMOR CEREBRI: ICD-10-CM

## 2024-09-05 DIAGNOSIS — F64.0 GENDER DYSPHORIA IN ADULT: ICD-10-CM

## 2024-09-05 RX ORDER — ESTRADIOL VALERATE 20 MG/ML
4 INJECTION INTRAMUSCULAR 2 TIMES WEEKLY
Qty: 5 ML | Refills: 3 | Status: SHIPPED | OUTPATIENT
Start: 2024-09-05

## 2024-09-05 RX ORDER — NEOMYCIN SULFATE, POLYMYXIN B SULFATE, HYDROCORTISONE 3.5; 10000; 1 MG/ML; [USP'U]/ML; MG/ML
3 SOLUTION/ DROPS AURICULAR (OTIC) 4 TIMES DAILY
Qty: 10 ML | Refills: 0 | Status: SHIPPED | OUTPATIENT
Start: 2024-09-05

## 2024-09-05 RX ORDER — ACETAZOLAMIDE 500 MG/1
500 CAPSULE, EXTENDED RELEASE ORAL DAILY
Qty: 30 CAPSULE | Refills: 0 | Status: SHIPPED | OUTPATIENT
Start: 2024-09-05

## 2024-09-05 NOTE — TELEPHONE ENCOUNTER
"Caller: Amos Powell \"[place]\"    Relationship: Self    Best call back number:    [phone] (Mobile)       Requested Prescriptions:   estradiol valerate (DELESTROGEN) 20 MG/ML injection     acetaZOLAMIDE (DIAMOX) 500 MG capsule       neomycin-polymyxin-hydrocortisone (CORTISPORIN) 3.5-73904-5 otic solution        Pharmacy where request should be sent:  [facility]/pharmacy #[id number] - [facility] [place] [address] [phone] [facility] [phone]      Last office visit with prescribing clinician: 3/11/2024   Last telemedicine visit with prescribing clinician: Visit date not found   Next office visit with prescribing clinician: Visit date not found     Additional details provided by patient: PATIENT CALLED TO REQUEST A MEDICATION REFILL ON HER MEDICATION. PATIENT STATES THAT SHE IS COMPLETELY OUT MEDICATION.        Does the patient have less than a 3 day supply:  [x] Yes  [] No    Would you like a call back once the refill request has been completed: [] Yes [] No    If the office needs to give you a call back, can they leave a voicemail: [] Yes [] No    Yuri See Rep   09/05/24 08:50 EDT         THANKS   "

## 2024-10-30 DIAGNOSIS — H60.502 ACUTE OTITIS EXTERNA OF LEFT EAR, UNSPECIFIED TYPE: ICD-10-CM

## 2024-10-30 DIAGNOSIS — G93.2 PSEUDOTUMOR CEREBRI: ICD-10-CM

## 2024-10-30 DIAGNOSIS — F64.0 GENDER DYSPHORIA IN ADULT: ICD-10-CM

## 2024-10-30 RX ORDER — NEOMYCIN SULFATE, POLYMYXIN B SULFATE, HYDROCORTISONE 3.5; 10000; 1 MG/ML; [USP'U]/ML; MG/ML
3 SOLUTION/ DROPS AURICULAR (OTIC) 4 TIMES DAILY
Qty: 10 ML | Refills: 0 | Status: SHIPPED | OUTPATIENT
Start: 2024-10-30

## 2024-10-30 RX ORDER — ACETAZOLAMIDE 500 MG/1
500 CAPSULE, EXTENDED RELEASE ORAL DAILY
Qty: 30 CAPSULE | Refills: 0 | Status: SHIPPED | OUTPATIENT
Start: 2024-10-30

## 2024-10-30 RX ORDER — ESTRADIOL VALERATE 20 MG/ML
4 INJECTION INTRAMUSCULAR 2 TIMES WEEKLY
Qty: 5 ML | Refills: 3 | Status: SHIPPED | OUTPATIENT
Start: 2024-10-31

## 2024-10-30 NOTE — TELEPHONE ENCOUNTER
"Caller: Amos Powell \"[place]\"    Relationship: Self    Best call back number: [phone]    Requested Prescriptions:   Requested Prescriptions     Pending Prescriptions Disp Refills    neomycin-polymyxin-hydrocortisone (CORTISPORIN) 3.5-58332-9 otic solution 10 mL 0     Sig: Administer 3 drops into the left ear 4 (Four) Times a Day.    estradiol valerate (DELESTROGEN) 20 MG/ML injection 5 mL 3     Sig: Inject 0.2 mL into the appropriate muscle as directed by prescriber 2 (Two) Times a Week. Appointment needed for refills.    acetaZOLAMIDE (DIAMOX) 500 MG capsule 30 capsule 0     Sig: Take 1 capsule by mouth Daily.        Pharmacy where request should be sent: [facility]/PHARMACY #[id number] - [facility] [place] [address] [phone] [facility] [phone] FX     Last office visit with prescribing clinician: 3/11/2024   Last telemedicine visit with prescribing clinician: Visit date not found   Next office visit with prescribing clinician: Visit date not found     Additional details provided by patient: OUT OF MEDICATION/3 DAYS LEFT    Does the patient have less than a 3 day supply:  [x] Yes  [] No    Would you like a call back once the refill request has been completed: [] Yes [x] No    If the office needs to give you a call back, can they leave a voicemail: [] Yes [x] No    Yuri Jacinto Rep   10/30/24 12:45 EDT         "

## 2024-12-04 DIAGNOSIS — F41.8 DEPRESSION WITH ANXIETY: Chronic | ICD-10-CM

## 2024-12-04 DIAGNOSIS — F64.0 GENDER DYSPHORIA IN ADULT: ICD-10-CM

## 2024-12-05 RX ORDER — BUPROPION HYDROCHLORIDE 150 MG/1
150 TABLET ORAL DAILY
Qty: 90 TABLET | Refills: 0 | Status: SHIPPED | OUTPATIENT
Start: 2024-12-05

## 2024-12-05 RX ORDER — PROGESTERONE 100 MG/1
100 CAPSULE ORAL DAILY
Qty: 90 CAPSULE | Refills: 0 | Status: SHIPPED | OUTPATIENT
Start: 2024-12-05

## 2024-12-05 NOTE — TELEPHONE ENCOUNTER
LAST REFILL - 05/28/24 bupropion; 10/05/23 progesterone  LAST VISIT - 03/11/24  NEXT VISIT - 12/26/24

## 2024-12-16 DIAGNOSIS — G93.2 PSEUDOTUMOR CEREBRI: ICD-10-CM

## 2024-12-16 RX ORDER — ACETAZOLAMIDE 500 MG/1
500 CAPSULE, EXTENDED RELEASE ORAL DAILY
Qty: 30 CAPSULE | Refills: 0 | Status: SHIPPED | OUTPATIENT
Start: 2024-12-16

## 2025-01-26 DIAGNOSIS — F64.0 GENDER DYSPHORIA IN ADULT: ICD-10-CM

## 2025-01-27 RX ORDER — ESTRADIOL VALERATE 20 MG/ML
4 INJECTION INTRAMUSCULAR 2 TIMES WEEKLY
Qty: 5 ML | Refills: 3 | OUTPATIENT
Start: 2025-01-27

## 2025-02-12 DIAGNOSIS — H60.502 ACUTE OTITIS EXTERNA OF LEFT EAR, UNSPECIFIED TYPE: ICD-10-CM

## 2025-02-12 DIAGNOSIS — F64.0 GENDER DYSPHORIA IN ADULT: ICD-10-CM

## 2025-02-12 RX ORDER — PROGESTERONE 100 MG/1
100 CAPSULE ORAL DAILY
Qty: 30 CAPSULE | Refills: 0 | Status: SHIPPED | OUTPATIENT
Start: 2025-02-12

## 2025-02-12 RX ORDER — NEOMYCIN SULFATE, POLYMYXIN B SULFATE, HYDROCORTISONE 3.5; 10000; 1 MG/ML; [USP'U]/ML; MG/ML
3 SOLUTION/ DROPS AURICULAR (OTIC) 4 TIMES DAILY
Qty: 10 ML | Refills: 0 | Status: SHIPPED | OUTPATIENT
Start: 2025-02-12

## 2025-02-12 RX ORDER — ESTRADIOL VALERATE 20 MG/ML
4 INJECTION INTRAMUSCULAR 2 TIMES WEEKLY
Qty: 5 ML | Refills: 0 | Status: SHIPPED | OUTPATIENT
Start: 2025-02-13

## 2025-02-12 NOTE — TELEPHONE ENCOUNTER
LAST REFILL - 10/30/24  LAST VISIT - 03/11/24  NEXT VISIT - not scheduled    Added to sig appt needed for further refills. Please note that last estradiol refill already had sig updated to request appt for further refills.

## 2025-03-02 DIAGNOSIS — G93.2 PSEUDOTUMOR CEREBRI: ICD-10-CM

## 2025-03-03 RX ORDER — ACETAZOLAMIDE 500 MG/1
500 CAPSULE, EXTENDED RELEASE ORAL DAILY
Qty: 30 CAPSULE | Refills: 0 | Status: SHIPPED | OUTPATIENT
Start: 2025-03-03

## 2025-03-13 DIAGNOSIS — F64.0 GENDER DYSPHORIA IN ADULT: ICD-10-CM

## 2025-03-13 RX ORDER — PROGESTERONE 100 MG/1
100 CAPSULE ORAL DAILY
Qty: 90 CAPSULE | OUTPATIENT
Start: 2025-03-13

## 2025-04-03 DIAGNOSIS — G93.2 PSEUDOTUMOR CEREBRI: ICD-10-CM

## 2025-04-03 RX ORDER — ACETAZOLAMIDE 500 MG/1
500 CAPSULE, EXTENDED RELEASE ORAL DAILY
Qty: 30 CAPSULE | Refills: 0 | OUTPATIENT
Start: 2025-04-03

## 2025-04-13 DIAGNOSIS — F64.0 GENDER DYSPHORIA IN ADULT: ICD-10-CM

## 2025-04-14 DIAGNOSIS — G93.2 PSEUDOTUMOR CEREBRI: ICD-10-CM

## 2025-04-14 DIAGNOSIS — H60.502 ACUTE OTITIS EXTERNA OF LEFT EAR, UNSPECIFIED TYPE: ICD-10-CM

## 2025-04-14 RX ORDER — NEOMYCIN SULFATE, POLYMYXIN B SULFATE, HYDROCORTISONE 3.5; 10000; 1 MG/ML; [USP'U]/ML; MG/ML
3 SOLUTION/ DROPS AURICULAR (OTIC) 4 TIMES DAILY
Qty: 10 ML | Refills: 0 | Status: SHIPPED | OUTPATIENT
Start: 2025-04-14

## 2025-04-14 RX ORDER — PROGESTERONE 100 MG/1
100 CAPSULE ORAL DAILY
Qty: 30 CAPSULE | Refills: 0 | Status: SHIPPED | OUTPATIENT
Start: 2025-04-14

## 2025-04-14 RX ORDER — ACETAZOLAMIDE 500 MG/1
500 CAPSULE, EXTENDED RELEASE ORAL DAILY
Qty: 30 CAPSULE | Refills: 0 | Status: SHIPPED | OUTPATIENT
Start: 2025-04-14

## 2025-04-14 NOTE — TELEPHONE ENCOUNTER
LAST REFILL - 03/03/25 acetazolamide; 02/12/25 cortisporin  LAST VISIT - 03/11/24  NEXT VISIT - 05/07/25

## 2025-05-07 ENCOUNTER — OFFICE VISIT (OUTPATIENT)
Dept: FAMILY MEDICINE CLINIC | Facility: CLINIC | Age: 30
End: 2025-05-07
Payer: MEDICAID

## 2025-05-07 VITALS
DIASTOLIC BLOOD PRESSURE: 92 MMHG | SYSTOLIC BLOOD PRESSURE: 118 MMHG | BODY MASS INDEX: 46.36 KG/M2 | HEART RATE: 95 BPM | HEIGHT: 69 IN | RESPIRATION RATE: 18 BRPM | OXYGEN SATURATION: 97 % | WEIGHT: 313 LBS

## 2025-05-07 DIAGNOSIS — F41.8 DEPRESSION WITH ANXIETY: Chronic | ICD-10-CM

## 2025-05-07 DIAGNOSIS — Z51.81 THERAPEUTIC DRUG MONITORING: ICD-10-CM

## 2025-05-07 DIAGNOSIS — G93.2 PSEUDOTUMOR CEREBRI: ICD-10-CM

## 2025-05-07 DIAGNOSIS — E34.9 HORMONE IMBALANCE: ICD-10-CM

## 2025-05-07 DIAGNOSIS — F64.0 GENDER DYSPHORIA IN ADULT: Primary | ICD-10-CM

## 2025-05-07 RX ORDER — BUPROPION HYDROCHLORIDE 150 MG/1
150 TABLET ORAL DAILY
Qty: 90 TABLET | Refills: 3 | Status: SHIPPED | OUTPATIENT
Start: 2025-05-07

## 2025-05-07 RX ORDER — PROGESTERONE 100 MG/1
100 CAPSULE ORAL DAILY
Qty: 90 CAPSULE | Refills: 3 | Status: SHIPPED | OUTPATIENT
Start: 2025-05-07

## 2025-05-07 RX ORDER — ACETAZOLAMIDE 500 MG/1
500 CAPSULE, EXTENDED RELEASE ORAL DAILY
Qty: 90 CAPSULE | Refills: 3 | Status: SHIPPED | OUTPATIENT
Start: 2025-05-07

## 2025-05-07 RX ORDER — ESTRADIOL VALERATE 20 MG/ML
4 INJECTION INTRAMUSCULAR 2 TIMES WEEKLY
Qty: 5 ML | Refills: 5 | Status: SHIPPED | OUTPATIENT
Start: 2025-05-08

## 2025-05-07 NOTE — PROGRESS NOTES
"Chief Complaint  Gender Dysphoria    Subjective    History of Present Illness  History of Present Illness  The patient presents for evaluation of gender dysphoria.    She reports that this year has been challenging, having lost her job in January and experiencing a difficult break-up shortly thereafter. She was unemployed for about three months and depleted her savings during that time. 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    She is currently on a regimen of Wellbutrin, which she reports as being effective. She has discontinued the use of Lexapro.     She administers estradiol her injections on Sundays and either Wednesdays or Thursdays. She is seeking refills for her estradiol and Wellbutrin prescriptions. Due for some labs today. Happy with the state of her transition. No unwanted side effects.    Additionally, she is requesting a refill of her acetazolamide prescription, which she takes daily and finds beneficial for management of her pseudotumor cerebri. She has discontinued the use of pantoprazole.      Objective     Vital Signs:   /92   Pulse 95   Resp 18   Ht 175.3 cm (69\")   Wt (!) 142 kg (313 lb)   SpO2 97%   BMI 46.22 kg/m²   Physical Exam  Vitals and nursing note reviewed.   Constitutional:       General: She is not in acute distress.     Appearance: Normal appearance. She is not ill-appearing.   Cardiovascular:      Rate and Rhythm: Normal rate and regular rhythm.      Pulses: Normal pulses.      Heart sounds: Normal heart sounds. No murmur heard.  Pulmonary:      Effort: Pulmonary effort is normal. No respiratory distress.      Breath sounds: Normal breath sounds. No rales.   Neurological:      Mental Status: She is alert and oriented to person, place, and time. Mental status is at baseline.   Psychiatric:         Mood and Affect: Mood normal.         Behavior: Behavior normal.            Assessment and Plan   Diagnoses and all orders for this " visit:    1. Gender dysphoria in adult (Primary)  -     Estradiol  -     Estrone  -     Testosterone  -     Comprehensive Metabolic Panel  -     Progesterone  -     Lipid Panel  -     estradiol valerate (DELESTROGEN) 20 MG/ML injection; Inject 0.2 mL into the appropriate muscle as directed by prescriber 2 (Two) Times a Week. Please discard vial 28 days after opening/puncturing.  Dispense: 5 mL; Refill: 5  -     Progesterone (PROMETRIUM) 100 MG capsule; Take 1 capsule by mouth Daily.  Dispense: 90 capsule; Refill: 3    2. Hormone imbalance  -     Estradiol  -     Estrone  -     Testosterone  -     Comprehensive Metabolic Panel  -     Progesterone  -     Lipid Panel    3. Pseudotumor cerebri  -     acetaZOLAMIDE (DIAMOX) 500 MG capsule; Take 1 capsule by mouth Daily.  Dispense: 90 capsule; Refill: 3    4. Depression with anxiety  -     buPROPion XL (WELLBUTRIN XL) 150 MG 24 hr tablet; Take 1 tablet by mouth Daily.  Dispense: 90 tablet; Refill: 3    5. Therapeutic drug monitoring  -     Estradiol  -     Estrone  -     Testosterone  -     Comprehensive Metabolic Panel  -     Progesterone  -     Lipid Panel      Assessment & Plan  1. Gender dysphoria.  - The patient is advised to fill the estradiol prescription monthly to stay ahead.  - A prescription for progesterone capsules has also been issued.  - The patient is no longer taking pantoprazole.  - Labs as above. I will contact her with results as available.    2. Pseudotumor cerebri.  - A prescription refill for acetazolamide has been sent to pharmacy.    3. Depression.  - Wellbutrin refilled as requested.      Recommended follow up as below. Encouraged communication via [employer] in the meantime.     Patient was given instructions and counseling regarding her condition or for health maintenance advice. Please see specific information pulled into the AVS (placed there by myself) if appropriate.    Return in about 6 months (around 11/7/2025), or if symptoms worsen or fail  to improve, for f/u gender-affirming hormone therapy, Preventive Health Maintenance.    Patient or patient representative verbalized consent for the use of Ambient Listening during the visit with  Nabil Jason MD for chart documentation. 5/7/2025  16:26 EDT    XIMENA Jason MD

## 2025-05-07 NOTE — PROGRESS NOTES
"Chief Complaint  Gender Dysphoria    Subjective    History of Present Illness        Objective     Vital Signs:   /92   Pulse 95   Resp 18   Ht 175.3 cm (69\")   Wt (!) 142 kg (313 lb)   SpO2 97%   BMI 46.22 kg/m²   Physical Exam   {AMBULATORY LABS (Optional):32290}         Assessment and Plan   There are no diagnoses linked to this encounter.  {Class 3 Severe Obesity (BMI >=40). (Optional):81568}    Recommended follow up as below. Encouraged communication via MyChart in the meantime.     Patient was given instructions and counseling regarding her condition or for health maintenance advice. Please see specific information pulled into the AVS (placed there by myself) if appropriate.    No follow-ups on file.    XIMENA Jason MD      "

## 2025-06-01 DIAGNOSIS — H60.502 ACUTE OTITIS EXTERNA OF LEFT EAR, UNSPECIFIED TYPE: ICD-10-CM

## 2025-06-01 DIAGNOSIS — F64.0 GENDER DYSPHORIA IN ADULT: ICD-10-CM

## 2025-06-02 RX ORDER — NEOMYCIN SULFATE, POLYMYXIN B SULFATE AND HYDROCORTISONE 3.5; 10000; 1 MG/ML; [IU]/ML; MG/ML
3 SOLUTION AURICULAR (OTIC) 4 TIMES DAILY
Qty: 10 ML | Refills: 0 | Status: SHIPPED | OUTPATIENT
Start: 2025-06-02

## 2025-06-02 RX ORDER — PROGESTERONE 100 MG/1
100 CAPSULE ORAL DAILY
Qty: 90 CAPSULE | Refills: 3 | Status: SHIPPED | OUTPATIENT
Start: 2025-06-02

## 2025-06-03 DIAGNOSIS — G93.2 PSEUDOTUMOR CEREBRI: ICD-10-CM

## 2025-06-03 RX ORDER — ACETAZOLAMIDE 500 MG/1
500 CAPSULE, EXTENDED RELEASE ORAL DAILY
Qty: 90 CAPSULE | Refills: 3 | Status: SHIPPED | OUTPATIENT
Start: 2025-06-03

## 2025-06-09 ENCOUNTER — OFFICE VISIT (OUTPATIENT)
Dept: FAMILY MEDICINE CLINIC | Facility: CLINIC | Age: 30
End: 2025-06-09
Payer: OTHER GOVERNMENT

## 2025-06-09 VITALS
HEIGHT: 69 IN | SYSTOLIC BLOOD PRESSURE: 132 MMHG | RESPIRATION RATE: 16 BRPM | DIASTOLIC BLOOD PRESSURE: 82 MMHG | BODY MASS INDEX: 45.78 KG/M2 | HEART RATE: 97 BPM | WEIGHT: 309.1 LBS | OXYGEN SATURATION: 98 %

## 2025-06-09 DIAGNOSIS — F41.8 DEPRESSION WITH ANXIETY: Chronic | ICD-10-CM

## 2025-06-09 DIAGNOSIS — R51.9 CHRONIC DAILY HEADACHE: Primary | ICD-10-CM

## 2025-06-09 PROCEDURE — 1160F RVW MEDS BY RX/DR IN RCRD: CPT | Performed by: FAMILY MEDICINE

## 2025-06-09 PROCEDURE — 99213 OFFICE O/P EST LOW 20 MIN: CPT | Performed by: FAMILY MEDICINE

## 2025-06-09 PROCEDURE — 1159F MED LIST DOCD IN RCRD: CPT | Performed by: FAMILY MEDICINE

## 2025-06-09 NOTE — PROGRESS NOTES
"Chief Complaint  Pseudotumor Cerebri    Subjective    History of Present Illness  History of Present Illness  The patient presents for evaluation of pseudotumor tension headaches.    She reports a progressive worsening and increased frequency of headaches, assumed to be due to pseudotumor and tension, which have become so severe that they are significantly impacting her quality of life. Over the past weekend, she was unable to engage in any activities and missed a friend's going away party due to the debilitating nature of her headaches. She describes the headaches as originating from her neck and shoulders, extending to the base of her skull. A knot at the back of her neck and shoulder was present throughout the weekend but has since resolved after using a Theracane. Despite taking Diamox, she experiences daily headaches that require the use of a heating pad and rest. Over-the-counter medications such as ibuprofen, Tylenol, and Excedrin have not been effective in managing her headaches, leading her to discontinue their use. Physical therapy and exercises provide some relief, but the benefits are minimal and difficult to maintain consistently.    She is currently employed in a computer-based job, which limits her ability to take breaks and perform stretches. She maintains an active lifestyle, including weightlifting and walking, but finds it increasingly difficult to manage her symptoms. Financial stress, depression, and anhedonia have worsened this year, and she believes her depression is exacerbated by her inability to engage in activities due to her headaches.      Objective     Vital Signs:   /82   Pulse 97   Resp 16   Ht 175.3 cm (69\")   Wt (!) 140 kg (309 lb 1.6 oz)   SpO2 98%   BMI 45.65 kg/m²   Physical Exam  Vitals and nursing note reviewed.   Constitutional:       General: She is not in acute distress.     Appearance: Normal appearance. She is not ill-appearing.   Cardiovascular:      Rate and " Rhythm: Normal rate and regular rhythm.      Pulses: Normal pulses.      Heart sounds: Normal heart sounds. No murmur heard.  Pulmonary:      Effort: Pulmonary effort is normal. No respiratory distress.      Breath sounds: Normal breath sounds. No rales.   Musculoskeletal:      Comments: Tense and tender in bilateral shoulders, trapezius, levator scapula, and posterior neck.   Neurological:      Mental Status: She is alert and oriented to person, place, and time. Mental status is at baseline.   Psychiatric:         Mood and Affect: Mood normal.         Behavior: Behavior normal.            Assessment and Plan   Diagnoses and all orders for this visit:    1. Chronic daily headache (Primary)  -     amitriptyline (ELAVIL) 25 MG tablet; Take 1 tablet by mouth Every Night.  Dispense: 90 tablet; Refill: 0    2. Depression with anxiety      Assessment & Plan  1. Chronic tension headaches.  - Amitriptyline 25 mg will be initiated at bedtime, with the option to start at half a tablet to assess tolerance. The potential side effect of morning grogginess was discussed. She was advised to gradually increase the dose every 4 days if tolerated.  - She was encouraged to engage in regular exercise, particularly focusing on chest stretching and back muscle strengthening during weight lifting.     2. Depression.  - The potential addition of Prozac or Lexapro to her current bupropion regimen was discussed, contingent on the response to amitriptyline for headache management.  - She was advised to message in a couple of weeks to assess the effectiveness of the current treatment plan and discuss further management options.    Recommended follow up as below. Encouraged communication via [facility] in the meantime.     Patient was given instructions and counseling regarding her condition or for health maintenance advice. Please see specific information pulled into the AVS (placed there by myself) if appropriate.    Return if symptoms worsen or  fail to improve.    Patient or patient representative verbalized consent for the use of Ambient Listening during the visit with  Nabil Jason MD for chart documentation. 6/9/2025  08:34 EDT    XIMENA Jason MD

## 2025-06-25 RX ORDER — FLUOXETINE 10 MG/1
10 CAPSULE ORAL DAILY
Qty: 90 CAPSULE | Refills: 0 | Status: SHIPPED | OUTPATIENT
Start: 2025-06-25

## 2025-07-20 DIAGNOSIS — F64.0 GENDER DYSPHORIA IN ADULT: ICD-10-CM

## 2025-07-20 DIAGNOSIS — H60.502 ACUTE OTITIS EXTERNA OF LEFT EAR, UNSPECIFIED TYPE: ICD-10-CM

## 2025-07-21 RX ORDER — FLUOXETINE 10 MG/1
10 CAPSULE ORAL DAILY
Qty: 90 CAPSULE | Refills: 1 | Status: SHIPPED | OUTPATIENT
Start: 2025-07-21

## 2025-07-21 RX ORDER — NEOMYCIN SULFATE, POLYMYXIN B SULFATE AND HYDROCORTISONE 3.5; 10000; 1 MG/ML; [IU]/ML; MG/ML
3 SOLUTION AURICULAR (OTIC) 4 TIMES DAILY
Qty: 10 ML | Refills: 0 | Status: SHIPPED | OUTPATIENT
Start: 2025-07-21

## 2025-07-21 RX ORDER — PROGESTERONE 100 MG/1
100 CAPSULE ORAL DAILY
Qty: 90 CAPSULE | Refills: 3 | Status: SHIPPED | OUTPATIENT
Start: 2025-07-21

## 2025-07-21 NOTE — TELEPHONE ENCOUNTER
Last office visit: 6/9/25    Next office visit: 11/12/25    Last refill: 6/2/25    Last lab: 5/7/25

## 2025-08-21 DIAGNOSIS — F64.0 GENDER DYSPHORIA IN ADULT: ICD-10-CM

## 2025-08-22 RX ORDER — ESTRADIOL VALERATE 20 MG/ML
4 INJECTION INTRAMUSCULAR 2 TIMES WEEKLY
Qty: 5 ML | Refills: 5 | OUTPATIENT
Start: 2025-08-25

## (undated) DEVICE — GLV SURG SIGNATURE ESSENTIAL PF LTX SZ6.5

## (undated) DEVICE — DUAL LUMEN URETERAL CATHETER

## (undated) DEVICE — SYS IRR PUMP SGL ACTN VAC SYR 10CC

## (undated) DEVICE — PK CYSTO 50

## (undated) DEVICE — KT SURG TURNOVER 050

## (undated) DEVICE — SOL IRRG H2O BG 3000ML STRL

## (undated) DEVICE — NITINOL WIRE WITH HYDROPHILIC TIP: Brand: SENSOR

## (undated) DEVICE — PK PROC TURNOVER

## (undated) DEVICE — SOLUTION,WATER,IRRIGATION,1000ML,STERILE: Brand: MEDLINE

## (undated) DEVICE — NITINOL STONE RETRIEVAL BASKET: Brand: ZERO TIP

## (undated) DEVICE — GLV SURG SIGNATURE ESSENTIAL PF LTX SZ7.5

## (undated) DEVICE — SOL IRRIG H2O 1000ML STRL

## (undated) DEVICE — CATH URETRL OPN/END 5F70CM